# Patient Record
Sex: FEMALE | Race: BLACK OR AFRICAN AMERICAN | NOT HISPANIC OR LATINO | URBAN - METROPOLITAN AREA
[De-identification: names, ages, dates, MRNs, and addresses within clinical notes are randomized per-mention and may not be internally consistent; named-entity substitution may affect disease eponyms.]

---

## 2017-07-22 ENCOUNTER — OUTPATIENT (OUTPATIENT)
Dept: OUTPATIENT SERVICES | Facility: HOSPITAL | Age: 67
LOS: 1 days | Discharge: HOME | End: 2017-07-22

## 2017-07-22 DIAGNOSIS — I10 ESSENTIAL (PRIMARY) HYPERTENSION: ICD-10-CM

## 2017-07-22 DIAGNOSIS — E11.9 TYPE 2 DIABETES MELLITUS WITHOUT COMPLICATIONS: ICD-10-CM

## 2018-02-24 ENCOUNTER — OUTPATIENT (OUTPATIENT)
Dept: OUTPATIENT SERVICES | Facility: HOSPITAL | Age: 68
LOS: 1 days | Discharge: HOME | End: 2018-02-24

## 2018-02-24 DIAGNOSIS — I10 ESSENTIAL (PRIMARY) HYPERTENSION: ICD-10-CM

## 2018-02-24 DIAGNOSIS — E11.9 TYPE 2 DIABETES MELLITUS WITHOUT COMPLICATIONS: ICD-10-CM

## 2018-02-24 DIAGNOSIS — E78.2 MIXED HYPERLIPIDEMIA: ICD-10-CM

## 2018-10-02 ENCOUNTER — OUTPATIENT (OUTPATIENT)
Dept: OUTPATIENT SERVICES | Facility: HOSPITAL | Age: 68
LOS: 1 days | Discharge: HOME | End: 2018-10-02

## 2018-10-02 DIAGNOSIS — I10 ESSENTIAL (PRIMARY) HYPERTENSION: ICD-10-CM

## 2018-10-02 DIAGNOSIS — E66.01 MORBID (SEVERE) OBESITY DUE TO EXCESS CALORIES: ICD-10-CM

## 2018-10-02 DIAGNOSIS — E11.9 TYPE 2 DIABETES MELLITUS WITHOUT COMPLICATIONS: ICD-10-CM

## 2018-10-02 DIAGNOSIS — E78.2 MIXED HYPERLIPIDEMIA: ICD-10-CM

## 2019-04-02 ENCOUNTER — CHART COPY (OUTPATIENT)
Age: 69
End: 2019-04-02

## 2019-04-02 ENCOUNTER — OUTPATIENT (OUTPATIENT)
Dept: OUTPATIENT SERVICES | Facility: HOSPITAL | Age: 69
LOS: 1 days | Discharge: HOME | End: 2019-04-02

## 2019-04-02 ENCOUNTER — APPOINTMENT (OUTPATIENT)
Dept: CARDIOLOGY | Facility: CLINIC | Age: 69
End: 2019-04-02
Payer: MEDICARE

## 2019-04-02 VITALS
HEIGHT: 61 IN | SYSTOLIC BLOOD PRESSURE: 123 MMHG | DIASTOLIC BLOOD PRESSURE: 70 MMHG | WEIGHT: 167 LBS | BODY MASS INDEX: 31.53 KG/M2

## 2019-04-02 DIAGNOSIS — E78.2 MIXED HYPERLIPIDEMIA: ICD-10-CM

## 2019-04-02 DIAGNOSIS — E11.9 TYPE 2 DIABETES MELLITUS WITHOUT COMPLICATIONS: ICD-10-CM

## 2019-04-02 DIAGNOSIS — E08.29 DIABETES MELLITUS DUE TO UNDERLYING CONDITION WITH OTHER DIABETIC KIDNEY COMPLICATION: ICD-10-CM

## 2019-04-02 DIAGNOSIS — I10 ESSENTIAL (PRIMARY) HYPERTENSION: ICD-10-CM

## 2019-04-02 DIAGNOSIS — R80.9 DIABETES MELLITUS DUE TO UNDERLYING CONDITION WITH OTHER DIABETIC KIDNEY COMPLICATION: ICD-10-CM

## 2019-04-02 DIAGNOSIS — Z78.9 OTHER SPECIFIED HEALTH STATUS: ICD-10-CM

## 2019-04-02 DIAGNOSIS — Z79.4 DIABETES MELLITUS DUE TO UNDERLYING CONDITION WITH OTHER DIABETIC KIDNEY COMPLICATION: ICD-10-CM

## 2019-04-02 PROCEDURE — 93000 ELECTROCARDIOGRAM COMPLETE: CPT

## 2019-04-02 PROCEDURE — 99214 OFFICE O/P EST MOD 30 MIN: CPT

## 2019-04-02 NOTE — PHYSICAL EXAM
[General Appearance - Well Developed] : well developed [Normal Appearance] : normal appearance [Well Groomed] : well groomed [General Appearance - Well Nourished] : well nourished [No Deformities] : no deformities [General Appearance - In No Acute Distress] : no acute distress [FreeTextEntry1] : left arm puse is weak

## 2019-04-02 NOTE — HISTORY OF PRESENT ILLNESS
[FreeTextEntry1] : pt has hx htn followed in the past by DR Treadwell\par  pt had e cho and stress test were normal\par  pt denies any chest pain\par  no dyspnoea\par  meds reviewed\par

## 2019-04-02 NOTE — ASSESSMENT
[FreeTextEntry1] : bp rt arm 120/80 and lt arm 90/60 significant difference noted but pt denies any sx\par  cardiac status is stable\par   e k g showed nsr no acute stt changes seen\par  meds renewed\par  labs done this am for DR Joel\par  will order ct angio of aotiac arch

## 2019-05-14 ENCOUNTER — APPOINTMENT (OUTPATIENT)
Dept: CARDIOLOGY | Facility: CLINIC | Age: 69
End: 2019-05-14
Payer: MEDICARE

## 2019-05-14 VITALS
DIASTOLIC BLOOD PRESSURE: 90 MMHG | WEIGHT: 167 LBS | SYSTOLIC BLOOD PRESSURE: 140 MMHG | HEIGHT: 61 IN | BODY MASS INDEX: 31.53 KG/M2

## 2019-05-14 DIAGNOSIS — R09.89 OTHER SPECIFIED SYMPTOMS AND SIGNS INVOLVING THE CIRCULATORY AND RESPIRATORY SYSTEMS: ICD-10-CM

## 2019-05-14 PROCEDURE — 99213 OFFICE O/P EST LOW 20 MIN: CPT

## 2019-05-14 NOTE — ASSESSMENT
[FreeTextEntry1] :  blood work from 4/19 reviewed\par  cbc cmp and lipid s are wnl\par  hgba1c 6.3 well controlled\par  needs aortic angiogram to explain low left arm pulse

## 2019-05-14 NOTE — PHYSICAL EXAM
[General Appearance - Well Developed] : well developed [Normal Appearance] : normal appearance [Well Groomed] : well groomed [No Deformities] : no deformities [General Appearance - Well Nourished] : well nourished [General Appearance - In No Acute Distress] : no acute distress

## 2019-05-14 NOTE — HISTORY OF PRESENT ILLNESS
[FreeTextEntry1] : pt is feeling well\par  no chest pains\par  pt has decreased pulse and bp on left arm\par  c/o occ dizzyness\par  no chest pain

## 2019-06-25 ENCOUNTER — RX RENEWAL (OUTPATIENT)
Age: 69
End: 2019-06-25

## 2019-08-06 ENCOUNTER — APPOINTMENT (OUTPATIENT)
Dept: CARDIOLOGY | Facility: CLINIC | Age: 69
End: 2019-08-06
Payer: MEDICARE

## 2019-08-06 VITALS
WEIGHT: 168 LBS | DIASTOLIC BLOOD PRESSURE: 75 MMHG | BODY MASS INDEX: 31.72 KG/M2 | HEIGHT: 61 IN | SYSTOLIC BLOOD PRESSURE: 130 MMHG

## 2019-08-06 PROCEDURE — 99213 OFFICE O/P EST LOW 20 MIN: CPT

## 2019-08-06 NOTE — HISTORY OF PRESENT ILLNESS
[FreeTextEntry1] : pt is feeling well\par  no chest pains\par  no palpitations\par  pt had ct angio for decreased puyse and bp on left side itb showed arteriosclerotic narrowing of left subclavian aretery  \par  pt is asymptomatic

## 2019-08-06 NOTE — ASSESSMENT
[FreeTextEntry1] :  meds reneewd\par  cardiac ststus is wnl\par  diabetes is better \par  needs blood work done by DR Joel

## 2019-09-30 ENCOUNTER — OUTPATIENT (OUTPATIENT)
Dept: OUTPATIENT SERVICES | Facility: HOSPITAL | Age: 69
LOS: 1 days | Discharge: HOME | End: 2019-09-30

## 2019-09-30 DIAGNOSIS — E78.2 MIXED HYPERLIPIDEMIA: ICD-10-CM

## 2019-09-30 DIAGNOSIS — E11.9 TYPE 2 DIABETES MELLITUS WITHOUT COMPLICATIONS: ICD-10-CM

## 2019-09-30 DIAGNOSIS — I10 ESSENTIAL (PRIMARY) HYPERTENSION: ICD-10-CM

## 2019-09-30 DIAGNOSIS — M25.562 PAIN IN LEFT KNEE: ICD-10-CM

## 2019-11-08 ENCOUNTER — APPOINTMENT (OUTPATIENT)
Dept: CARDIOLOGY | Facility: CLINIC | Age: 69
End: 2019-11-08
Payer: MEDICARE

## 2019-11-08 VITALS
BODY MASS INDEX: 31.34 KG/M2 | WEIGHT: 166 LBS | DIASTOLIC BLOOD PRESSURE: 80 MMHG | HEIGHT: 61 IN | SYSTOLIC BLOOD PRESSURE: 130 MMHG

## 2019-11-08 PROCEDURE — 99213 OFFICE O/P EST LOW 20 MIN: CPT

## 2019-11-08 NOTE — PHYSICAL EXAM
[Normal Appearance] : normal appearance [General Appearance - Well Developed] : well developed [General Appearance - Well Nourished] : well nourished [General Appearance - In No Acute Distress] : no acute distress [Well Groomed] : well groomed [No Deformities] : no deformities [Abdomen Tenderness] : non-tender [] : no hepato-splenomegaly [Abdomen Soft] : soft [Abdomen Mass (___ Cm)] : no abdominal mass palpated

## 2019-11-08 NOTE — PHYSICAL EXAM
[General Appearance - Well Developed] : well developed [Normal Appearance] : normal appearance [No Deformities] : no deformities [Well Groomed] : well groomed [General Appearance - Well Nourished] : well nourished [General Appearance - In No Acute Distress] : no acute distress [Abdomen Mass (___ Cm)] : no abdominal mass palpated [Abdomen Tenderness] : non-tender [Abdomen Soft] : soft [] : no hepato-splenomegaly

## 2019-12-11 ENCOUNTER — RX RENEWAL (OUTPATIENT)
Age: 69
End: 2019-12-11

## 2019-12-17 ENCOUNTER — OUTPATIENT (OUTPATIENT)
Dept: OUTPATIENT SERVICES | Facility: HOSPITAL | Age: 69
LOS: 1 days | Discharge: HOME | End: 2019-12-17

## 2019-12-17 DIAGNOSIS — K92.1 MELENA: ICD-10-CM

## 2020-02-13 ENCOUNTER — APPOINTMENT (OUTPATIENT)
Dept: CARDIOLOGY | Facility: CLINIC | Age: 70
End: 2020-02-13
Payer: MEDICARE

## 2020-02-13 VITALS
SYSTOLIC BLOOD PRESSURE: 115 MMHG | DIASTOLIC BLOOD PRESSURE: 65 MMHG | BODY MASS INDEX: 29.83 KG/M2 | HEIGHT: 61 IN | WEIGHT: 158 LBS

## 2020-02-13 PROCEDURE — 99213 OFFICE O/P EST LOW 20 MIN: CPT

## 2020-02-13 NOTE — REASON FOR VISIT
[Follow-Up - Clinic] : a clinic follow-up of [Hyperlipidemia] : hyperlipidemia [FreeTextEntry1] : diabetes [Hypertension] : hypertension

## 2020-02-13 NOTE — HISTORY OF PRESENT ILLNESS
[FreeTextEntry1] : pt is feeling well\par  pt lost 8 lbs fbs [id number] good\par  no chest pains\par  no palp[itations\par  meds reviewed\par  need blood work

## 2020-02-13 NOTE — ASSESSMENT
[FreeTextEntry1] : pt is feeling well\par  no anginal sx\par  pt has weak left radial pulse\par  bp 130/80 well controlled\par  meds reviewed\par  blood work will be done by pmd DR Joel\par  cardiac ststus is stable

## 2020-04-27 ENCOUNTER — RX RENEWAL (OUTPATIENT)
Age: 70
End: 2020-04-27

## 2020-06-20 ENCOUNTER — RX RENEWAL (OUTPATIENT)
Age: 70
End: 2020-06-20

## 2020-08-04 ENCOUNTER — APPOINTMENT (OUTPATIENT)
Dept: CARDIOLOGY | Facility: CLINIC | Age: 70
End: 2020-08-04
Payer: MEDICARE

## 2020-08-04 VITALS — WEIGHT: 160 LBS | BODY MASS INDEX: 30.23 KG/M2 | SYSTOLIC BLOOD PRESSURE: 124 MMHG | DIASTOLIC BLOOD PRESSURE: 75 MMHG

## 2020-08-04 PROCEDURE — 93000 ELECTROCARDIOGRAM COMPLETE: CPT

## 2020-08-04 PROCEDURE — 99213 OFFICE O/P EST LOW 20 MIN: CPT

## 2020-08-04 NOTE — ASSESSMENT
[FreeTextEntry1] : htn well controlled\par  meds reviewed\par  e k g nsr pac nonspecific t wave changes noted\par  pmd did blood work in may\par  cardiac status is stable

## 2020-08-04 NOTE — PHYSICAL EXAM
[General Appearance - Well Developed] : well developed [Well Groomed] : well groomed [Normal Appearance] : normal appearance [General Appearance - Well Nourished] : well nourished [No Deformities] : no deformities [Heart Rate And Rhythm] : heart rate and rhythm were normal [General Appearance - In No Acute Distress] : no acute distress [Heart Sounds] : normal S1 and S2 [Murmurs] : no murmurs present

## 2020-08-04 NOTE — HISTORY OF PRESENT ILLNESS
[FreeTextEntry1] : pt is feeling better\par  pt denies any chest pains or dyspnoea\par  pt  was seen by pmd found elevated bp and renal sono was done in 5/20 was wnl\par  meds reviewed\par  bp is well controlled now 124/80

## 2020-11-05 ENCOUNTER — APPOINTMENT (OUTPATIENT)
Dept: CARDIOLOGY | Facility: CLINIC | Age: 70
End: 2020-11-05
Payer: MEDICARE

## 2020-11-05 VITALS
SYSTOLIC BLOOD PRESSURE: 123 MMHG | WEIGHT: 161 LBS | DIASTOLIC BLOOD PRESSURE: 70 MMHG | TEMPERATURE: 97.5 F | BODY MASS INDEX: 30.42 KG/M2

## 2020-11-05 PROCEDURE — 99213 OFFICE O/P EST LOW 20 MIN: CPT

## 2020-11-05 NOTE — ASSESSMENT
[FreeTextEntry1] : bp 130/80\par  no anginak sx\par  cardiac status is stable\par  meds reviewed with pt

## 2020-11-05 NOTE — HISTORY OF PRESENT ILLNESS
[FreeTextEntry1] : pt is feeling better\par  no chest pain\par  no exertional dyspnoea\par   meds reviewed\par  pt is seen by pmd and blood work done\par  fbs today was 110

## 2020-11-17 ENCOUNTER — RX RENEWAL (OUTPATIENT)
Age: 70
End: 2020-11-17

## 2021-01-01 ENCOUNTER — APPOINTMENT (OUTPATIENT)
Dept: NEUROSURGERY | Facility: CLINIC | Age: 71
End: 2021-01-01
Payer: MEDICARE

## 2021-01-01 ENCOUNTER — APPOINTMENT (OUTPATIENT)
Dept: NEUROLOGY | Facility: CLINIC | Age: 71
End: 2021-01-01
Payer: MEDICARE

## 2021-01-01 ENCOUNTER — APPOINTMENT (OUTPATIENT)
Dept: NEUROLOGY | Facility: CLINIC | Age: 71
End: 2021-01-01

## 2021-01-01 ENCOUNTER — APPOINTMENT (OUTPATIENT)
Dept: CARDIOLOGY | Facility: CLINIC | Age: 71
End: 2021-01-01
Payer: MEDICARE

## 2021-01-01 ENCOUNTER — NON-APPOINTMENT (OUTPATIENT)
Age: 71
End: 2021-01-01

## 2021-01-01 VITALS
BODY MASS INDEX: 25.91 KG/M2 | DIASTOLIC BLOOD PRESSURE: 82 MMHG | OXYGEN SATURATION: 98 % | HEART RATE: 78 BPM | TEMPERATURE: 97.3 F | SYSTOLIC BLOOD PRESSURE: 133 MMHG | WEIGHT: 132 LBS | HEIGHT: 60 IN

## 2021-01-01 VITALS
WEIGHT: 136 LBS | DIASTOLIC BLOOD PRESSURE: 60 MMHG | BODY MASS INDEX: 25.68 KG/M2 | HEIGHT: 61 IN | TEMPERATURE: 97.2 F | HEART RATE: 81 BPM | SYSTOLIC BLOOD PRESSURE: 110 MMHG

## 2021-01-01 VITALS — WEIGHT: 137 LBS | HEIGHT: 61 IN | BODY MASS INDEX: 25.86 KG/M2

## 2021-01-01 DIAGNOSIS — E78.5 HYPERLIPIDEMIA, UNSPECIFIED: ICD-10-CM

## 2021-01-01 DIAGNOSIS — I10 ESSENTIAL (PRIMARY) HYPERTENSION: ICD-10-CM

## 2021-01-01 LAB
ALBUMIN SERPL ELPH-MCNC: 4.4 G/DL
ALP BLD-CCNC: 81 U/L
ALT SERPL-CCNC: 43 U/L
ANION GAP SERPL CALC-SCNC: 12 MMOL/L
AST SERPL-CCNC: 39 U/L
BASOPHILS # BLD AUTO: 0.06 K/UL
BASOPHILS NFR BLD AUTO: 0.8 %
BILIRUB SERPL-MCNC: 0.6 MG/DL
BUN SERPL-MCNC: 24 MG/DL
CALCIUM SERPL-MCNC: 10 MG/DL
CHLORIDE SERPL-SCNC: 101 MMOL/L
CO2 SERPL-SCNC: 25 MMOL/L
CREAT SERPL-MCNC: 0.8 MG/DL
EOSINOPHIL # BLD AUTO: 0.05 K/UL
EOSINOPHIL NFR BLD AUTO: 0.6 %
GLUCOSE SERPL-MCNC: 90 MG/DL
HCT VFR BLD CALC: 41.5 %
HGB BLD-MCNC: 13.5 G/DL
IMM GRANULOCYTES NFR BLD AUTO: 0.5 %
LYMPHOCYTES # BLD AUTO: 2.06 K/UL
LYMPHOCYTES NFR BLD AUTO: 25.9 %
MAN DIFF?: NORMAL
MCHC RBC-ENTMCNC: 31.1 PG
MCHC RBC-ENTMCNC: 32.5 G/DL
MCV RBC AUTO: 95.6 FL
MONOCYTES # BLD AUTO: 0.53 K/UL
MONOCYTES NFR BLD AUTO: 6.7 %
NEUTROPHILS # BLD AUTO: 5.2 K/UL
NEUTROPHILS NFR BLD AUTO: 65.5 %
PLATELET # BLD AUTO: 231 K/UL
POTASSIUM SERPL-SCNC: 4.4 MMOL/L
PROT SERPL-MCNC: 6.4 G/DL
RBC # BLD: 4.34 M/UL
RBC # FLD: 13.1 %
SODIUM SERPL-SCNC: 138 MMOL/L
WBC # FLD AUTO: 7.94 K/UL

## 2021-01-01 PROCEDURE — 99215 OFFICE O/P EST HI 40 MIN: CPT

## 2021-01-01 PROCEDURE — 99204 OFFICE O/P NEW MOD 45 MIN: CPT

## 2021-01-01 PROCEDURE — 99213 OFFICE O/P EST LOW 20 MIN: CPT

## 2021-01-01 PROCEDURE — 93000 ELECTROCARDIOGRAM COMPLETE: CPT

## 2021-02-05 ENCOUNTER — APPOINTMENT (OUTPATIENT)
Dept: CARDIOLOGY | Facility: CLINIC | Age: 71
End: 2021-02-05
Payer: MEDICARE

## 2021-02-05 VITALS
TEMPERATURE: 97.3 F | SYSTOLIC BLOOD PRESSURE: 130 MMHG | WEIGHT: 160 LBS | BODY MASS INDEX: 30.23 KG/M2 | HEART RATE: 85 BPM | DIASTOLIC BLOOD PRESSURE: 70 MMHG

## 2021-02-05 PROCEDURE — 93000 ELECTROCARDIOGRAM COMPLETE: CPT

## 2021-02-05 PROCEDURE — 99212 OFFICE O/P EST SF 10 MIN: CPT

## 2021-02-05 NOTE — ASSESSMENT
[FreeTextEntry1] :  e k g nsr poor r vave progression in chest leads\par  pt is asymptomatic no cardiac sx\par  meds reviewed\par  labs reviewed all wnl

## 2021-02-05 NOTE — HISTORY OF PRESENT ILLNESS
[FreeTextEntry1] : pt is feeling well\par  no anginal sx\par  no exertional dyspnoea\par  meds reviewed\par  blood work from 11/20 reviewed diabetes and lipids controlled well\par  hgba1c 6.2\par  no cardiav sx

## 2021-03-03 ENCOUNTER — APPOINTMENT (OUTPATIENT)
Dept: NEUROLOGY | Facility: CLINIC | Age: 71
End: 2021-03-03
Payer: MEDICARE

## 2021-03-03 VITALS
OXYGEN SATURATION: 97 % | SYSTOLIC BLOOD PRESSURE: 142 MMHG | HEIGHT: 61 IN | TEMPERATURE: 98.8 F | DIASTOLIC BLOOD PRESSURE: 81 MMHG | BODY MASS INDEX: 31.53 KG/M2 | HEART RATE: 86 BPM | WEIGHT: 167 LBS

## 2021-03-03 DIAGNOSIS — R29.898 OTHER SYMPTOMS AND SIGNS INVOLVING THE MUSCULOSKELETAL SYSTEM: ICD-10-CM

## 2021-03-03 DIAGNOSIS — R20.0 ANESTHESIA OF SKIN: ICD-10-CM

## 2021-03-03 DIAGNOSIS — R20.2 ANESTHESIA OF SKIN: ICD-10-CM

## 2021-03-03 PROCEDURE — 99203 OFFICE O/P NEW LOW 30 MIN: CPT

## 2021-03-03 NOTE — REVIEW OF SYSTEMS
[Hand Weakness] :  hand weakness [Numbness] : numbness [Tingling] : tingling [Arthralgias] : arthralgias [Negative] : Heme/Lymph

## 2021-03-03 NOTE — HISTORY OF PRESENT ILLNESS
[FreeTextEntry1] : FAITH FU is a 70 year old woman with history of hypertensin and diabetes is here as a new patient to be assessed for constant numbness in her toes  for the pas few months and right hand  weakness in right hand. \par She denies significant numbness in the right hand but reports dropping objects. She denies significant neck or low back pain. No speech changes or blurred vision.

## 2021-03-03 NOTE — ASSESSMENT
[FreeTextEntry1] : FAITH FU is a 70 year old woman with history of hypertension and diabetes presents with new onset right hand weakness and numbness and paresthesia in the feet. Exam is significant for mild right  weakness with no sensory deficit, general hyperreflexia and sensory deficit in the feet suspected for neuropathy. Given the hand weakness and hyperreflexia, would like assess the UMN lesion in the brain and cervical spine. I would consider performing EMG in the office checking for neuropathy.

## 2021-03-03 NOTE — PHYSICAL EXAM
[FreeTextEntry1] : Mental status: Awake, alert and oriented x3.  Recent and remote memory intact.  Naming, repetition and comprehension intact.  Attention/concentration intact.  No dysarthria, no aphasia.  Fund of knowledge appropriate.  \par Cranial nerves: Pupils equally round and reactive to light, visual fields full, no nystagmus, extraocular muscles intact, V1 through V3 intact bilaterally and symmetric, face symmetric, hearing intact to finger rub, palate elevation symmetric, tongue was midline.\par Motor:  MRC grading 5/5 b/l UE/LE.   strength is mildly weak in the right hand.  Normal tone and bulk.  No abnormal movements.  No bradykinesia. No rigidity \par Sensation: Reduced vibration in the toes and ankles \par Coordination: No dysmetria on finger-to-nose\par Reflexes: 4+ in bilateral UE/LE, downgoing toes bilaterally. Positive clonus in the right leg.  \par Gait: wide based  No ataxia.  Romberg positive \par \par

## 2021-05-03 ENCOUNTER — APPOINTMENT (OUTPATIENT)
Dept: CARDIOLOGY | Facility: CLINIC | Age: 71
End: 2021-05-03
Payer: MEDICARE

## 2021-05-03 VITALS
HEIGHT: 61 IN | DIASTOLIC BLOOD PRESSURE: 80 MMHG | BODY MASS INDEX: 28.89 KG/M2 | TEMPERATURE: 97.4 F | WEIGHT: 153 LBS | SYSTOLIC BLOOD PRESSURE: 130 MMHG | HEART RATE: 104 BPM

## 2021-05-03 PROCEDURE — 93000 ELECTROCARDIOGRAM COMPLETE: CPT

## 2021-05-03 PROCEDURE — 99212 OFFICE O/P EST SF 10 MIN: CPT

## 2021-05-03 NOTE — REASON FOR VISIT
[Symptom and Test Evaluation] : symptom and test evaluation [Arrhythmia/ECG Abnorrmalities] : arrhythmia/ECG abnormalities [Hyperlipidemia] : hyperlipidemia [Hypertension] : hypertension

## 2021-05-03 NOTE — ASSESSMENT
[FreeTextEntry1] :  pt has no cardiac sx\par  e k g nsr lad and old ant wll mi changes no change from old e k g\par  cardiac ststus is stable\par  htn well controlled \par  blood work done by nidhi drake

## 2021-05-03 NOTE — HISTORY OF PRESENT ILLNESS
[FreeTextEntry1] : pt is feeling well\par  pt denies any anginal sx at rest or exertion\par  pt was seen by p m d and also neurologist mri done\par  no palpitations or dyspnoea\par  meds reviewed\par  diabetes is better

## 2021-05-24 ENCOUNTER — RX RENEWAL (OUTPATIENT)
Age: 71
End: 2021-05-24

## 2021-08-03 ENCOUNTER — APPOINTMENT (OUTPATIENT)
Dept: CARDIOLOGY | Facility: CLINIC | Age: 71
End: 2021-08-03
Payer: MEDICARE

## 2021-08-03 VITALS
TEMPERATURE: 96.9 F | HEART RATE: 79 BPM | BODY MASS INDEX: 26.81 KG/M2 | DIASTOLIC BLOOD PRESSURE: 90 MMHG | WEIGHT: 142 LBS | SYSTOLIC BLOOD PRESSURE: 140 MMHG | HEIGHT: 61 IN

## 2021-08-03 DIAGNOSIS — E11.9 TYPE 2 DIABETES MELLITUS W/OUT COMPLICATIONS: ICD-10-CM

## 2021-08-03 PROCEDURE — 99212 OFFICE O/P EST SF 10 MIN: CPT

## 2021-08-03 PROCEDURE — 93000 ELECTROCARDIOGRAM COMPLETE: CPT

## 2021-08-03 NOTE — ASSESSMENT
[FreeTextEntry1] :  shauna k g shows nsr lad no change\par  bp 130/80 well controlled\par  meds reviewed\par  cardiac ststus is stable\par  no pedal edema noted

## 2021-08-03 NOTE — HISTORY OF PRESENT ILLNESS
[FreeTextEntry1] : pt was seen by neurology for dysarthria and weakness poss A L S\par  no chest pains\par  nom exertional dyspnoea\par  meds reviewed\par  blood work from 6/21 reviewed

## 2021-08-30 ENCOUNTER — RX RENEWAL (OUTPATIENT)
Age: 71
End: 2021-08-30

## 2021-11-08 PROBLEM — I10 ESSENTIAL HYPERTENSION: Status: ACTIVE | Noted: 2019-04-02

## 2021-11-08 PROBLEM — E78.5 HYPERLIPIDEMIA, UNSPECIFIED HYPERLIPIDEMIA TYPE: Status: ACTIVE | Noted: 2019-04-02

## 2021-11-08 NOTE — HISTORY OF PRESENT ILLNESS
[FreeTextEntry1] : pt is feeling well\par  no anginal sx \par \par  meds reviewed\par  pt is seen by nidhi drake\par  blood work done by nidhi drake\par   [id number]

## 2021-11-08 NOTE — ASSESSMENT
[FreeTextEntry1] :  htn well controlled\par  no cardiac s\par  e k g nsr old ant wall mi cahnges no change from old e k g\par  no chf\par  cardiac ststus is stable

## 2021-11-26 NOTE — ASSESSMENT
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 I do not see clinical or radiographic evidence of NPH. I do not see i do not think this represents myopathy. At this time I do not feel LP or muscle biopst or structural surgery of the craniospinal axis is warranted.\par \par Given that she lives in  and getting up to the [place] [facility] clinic would be difficult I have referred her for further evaluation by Dr. Randy Leblanc for confirmation fo diagnosis as well as entrance into the ALS program/clinic here in [facility].\par \par She can follow up as needed.

## 2021-11-26 NOTE — HISTORY OF PRESENT ILLNESS
[FreeTextEntry1] : second opinion- tongue fasciculations, gait imbalance [de-identified] : This is a yeni 71 yrs old right handed female who presents today for a second opinion, accompanied by her daughter, in July 2021 she was diagnosed with ALS by Dr. OLIVERIO Witt ([facility]), patient unable to get a sooner appt with Dr. Riggs, saw Dr. Mac last Monday who was not convinced she had ALS, and send her to neurosurgery for further evaluation for a muscle biopsy and spinal tap. \par Ms. Mandujano reports of minor falls starting in [id number], then in MArch 2020, she had frequent falls, off balance, bilateral legs weakness, and slurred speech. THree months ago she noticed left hand weakness which causes her to drop items. \par Has had loss 10 pounds in the last month, unintentional. \par Denies trouble swallowing or chewing. \par Denies memory issues, and urinary incontinence. \par \par MRI debra shows atrophy with expected ventriculomegaly\par cervical spine with diffuse spondyloarthropathy with mild to moderate multi-level stenosis no cord signal abnormality\par Thoracolumbar spine with severe degenerative scoliotic deformity. Pt with no significant back or leg pain.

## 2021-12-02 NOTE — ASSESSMENT
[FreeTextEntry1] : 70 yo RHF w/ h/o HTN, DM, DLD currently p/w progressive ataxia with asymmetric non-length dependent weakness > numbness w/ hyperreflexia suspicious for MND/ALS.  Will obtain full records from Dr. Witt but agree with clinical diagnosis for now and will continue current treatment.  Family and pt would like to transfer care to  since difficult to travel to [place].  \par \par Recommendations:\par - obtain full records from Dr. Witt\par - continue riluzole 50 mg BID\par - check b/w including [id number]\par - continue BiPAP/cough assist\par - increase caloric intake as per nutritionist\par - recommend contact S/W to discuss possible ramp installation \par - PT/OT eval\par - wheelchair clinic eval\par - SLP eval for MBS and voice banking\par - continue AFO bracing\par - discussed edaravone infusions - unlikely to be candidate since significant fall risk already\par - discussed PEG option- will observe caloric intake for now\par - will refer to [facility]\par - enroll in Neuromuscular/ALS Clinic - next f/u in 3 months

## 2021-12-02 NOTE — HISTORY OF PRESENT ILLNESS
[FreeTextEntry1] : This is a 72 yo RHF previously seen by Dr. Riggs earlier this year with progressive RUE weakness and ataxia.  Per pt had been having problems with ataxia and fall since January 2020 using cane for ambulation but progressed.  Since January 2021 started noticing R hand  weakness with progressive ataxia.  Had also numbness and paresthesias in the distal toes that were chronic which has not progressed.  Was seen by Dr. Riggs who noted hyperreflexia and advised to start workup including neuroimaging and EMG/NCS.  Had neuroimaging with nonspecific changes and multilevel DDD but went to see Dr. Witt at [facility] instead who performed EMG/NCS and diagnosed her with MND/ALS.  After diagnosis confirmed, she was enrolled in ALS clinic and had evaluation with respiratory therapist, nutritionist, PT, s/w but had not seen SLP or OT.  Had cough-assist and BiPAP ordered which she uses on occasion.  Also had b/l AFO braces ordered by PT.  Continues to use rolling walker for ambulation but has 5 steps to get into her house.  \par \par Currently denies any coughing when swallowing but does tend to take longer to finish meals.  Has lost 50 lbs over the last year but still has good appetite and manages to finish everything on her plate.  Denies any orthopnea and uses BiPAP sparingly with use of 2 pillows but still sleeps on her side.  Still able to perform ADLs but finds it harder due to  weakness in the RUE.  Was started on Riluzole by Dr. Witt but was told recent b/w with slight elevation in LFTs but pt denies any GI discomfort or nausea currently.  Is otherwise stable.  Had 3rd opinion with Dr. Mac who referred pt to Dr. Higgins who in turn referred her to us for evaluation and enrollment in ALS clinic.  Denies any hypersialorrhea or emotional lability/depression/anxiety.

## 2021-12-02 NOTE — PHYSICAL EXAM
[FreeTextEntry1] : Physical examination:  \par General:   The patient is pleasant, cooperative, well dressed and in no acute distress.  Appearance is consistent with chronologic age.  No abnormal facies.\par Neurologic examination:  The patient is oriented to person, place, time and date.   Remote and recent memory is normal.   Fund of knowledge is intact and normal.  Language with normal repetition, comprehension and naming.  Nondysarthric.   \par Cranial nerves examination: intact VA, VFF.  EOMI w/o nystagmus, skew or reported double vision.  PERRL.  No ptosis/weakness of eyelid closure.  Facial sensation is normal with normal bite.  No facial asymmetry.  Hearing grossly intact b/l.  Palate elevates midline.  Neck flexion/extension, SCM/Trap strength normal.  Tongue w/ significant atrophy with fasciculations.    \par Motor examination:   decreased bulk with atrophy.  (+) fasciculations b/l UE.\par Formal Muscle Strength Testing: (MRC grade R/L) 4+/4+YOHAN, 4/4+ BB, 5/5 TR, WF, WE; 3/3 FDI; 4/4 ILP; 5/5 QDS, HS; 4/4 DF; 5/4+ PF; 3/3 TP/PL.  \par Reflexes:   3+ b/l pectoralis, biceps, triceps, brachioradialis, patella and Achilles.  Plantar response downgoing b/l.  Jaw jerk, Rosario, clonus absent.  \par Sensory examination:  grossly intact\par Cerebellum:   FTN/HKS intact with normal ADAMARIS in all limbs.   Gait is wide based, steppage requires walker.  \par \par

## 2022-01-01 ENCOUNTER — APPOINTMENT (OUTPATIENT)
Dept: NEUROLOGY | Facility: CLINIC | Age: 72
End: 2022-01-01

## 2022-01-01 ENCOUNTER — INPATIENT (INPATIENT)
Facility: HOSPITAL | Age: 72
LOS: 11 days | Discharge: HOSPICE | End: 2022-09-30
Attending: STUDENT IN AN ORGANIZED HEALTH CARE EDUCATION/TRAINING PROGRAM | Admitting: STUDENT IN AN ORGANIZED HEALTH CARE EDUCATION/TRAINING PROGRAM

## 2022-01-01 ENCOUNTER — RESULT REVIEW (OUTPATIENT)
Age: 72
End: 2022-01-01

## 2022-01-01 ENCOUNTER — TRANSCRIPTION ENCOUNTER (OUTPATIENT)
Age: 72
End: 2022-01-01

## 2022-01-01 ENCOUNTER — APPOINTMENT (OUTPATIENT)
Dept: NEUROLOGY | Facility: CLINIC | Age: 72
End: 2022-01-01
Payer: MEDICARE

## 2022-01-01 ENCOUNTER — APPOINTMENT (OUTPATIENT)
Dept: CARDIOLOGY | Facility: CLINIC | Age: 72
End: 2022-01-01

## 2022-01-01 ENCOUNTER — OUTPATIENT (OUTPATIENT)
Dept: OUTPATIENT SERVICES | Facility: HOSPITAL | Age: 72
LOS: 1 days | Discharge: HOME | End: 2022-01-01

## 2022-01-01 ENCOUNTER — NON-APPOINTMENT (OUTPATIENT)
Age: 72
End: 2022-01-01

## 2022-01-01 ENCOUNTER — OUTPATIENT (OUTPATIENT)
Dept: OUTPATIENT SERVICES | Facility: HOSPITAL | Age: 72
LOS: 1 days | Discharge: HOME | End: 2022-01-01
Payer: MEDICARE

## 2022-01-01 ENCOUNTER — APPOINTMENT (OUTPATIENT)
Dept: CARDIOLOGY | Facility: CLINIC | Age: 72
End: 2022-01-01
Payer: MEDICARE

## 2022-01-01 ENCOUNTER — APPOINTMENT (OUTPATIENT)
Dept: PULMONOLOGY | Facility: CLINIC | Age: 72
End: 2022-01-01

## 2022-01-01 VITALS
BODY MASS INDEX: 24.94 KG/M2 | TEMPERATURE: 96 F | HEART RATE: 60 BPM | SYSTOLIC BLOOD PRESSURE: 110 MMHG | HEIGHT: 60 IN | DIASTOLIC BLOOD PRESSURE: 80 MMHG | WEIGHT: 127 LBS

## 2022-01-01 VITALS
WEIGHT: 83 LBS | OXYGEN SATURATION: 97 % | DIASTOLIC BLOOD PRESSURE: 70 MMHG | HEART RATE: 76 BPM | HEIGHT: 62 IN | BODY MASS INDEX: 15.27 KG/M2 | SYSTOLIC BLOOD PRESSURE: 130 MMHG

## 2022-01-01 VITALS
HEIGHT: 60 IN | BODY MASS INDEX: 16.3 KG/M2 | TEMPERATURE: 97.6 F | SYSTOLIC BLOOD PRESSURE: 130 MMHG | DIASTOLIC BLOOD PRESSURE: 82 MMHG | HEART RATE: 64 BPM | OXYGEN SATURATION: 93 % | WEIGHT: 83 LBS

## 2022-01-01 VITALS
OXYGEN SATURATION: 96 % | SYSTOLIC BLOOD PRESSURE: 149 MMHG | HEART RATE: 111 BPM | TEMPERATURE: 97 F | WEIGHT: 127 LBS | BODY MASS INDEX: 24.94 KG/M2 | DIASTOLIC BLOOD PRESSURE: 91 MMHG | HEIGHT: 60 IN

## 2022-01-01 VITALS — TEMPERATURE: 95 F | HEART RATE: 72 BPM | DIASTOLIC BLOOD PRESSURE: 79 MMHG | SYSTOLIC BLOOD PRESSURE: 118 MMHG

## 2022-01-01 VITALS
DIASTOLIC BLOOD PRESSURE: 71 MMHG | SYSTOLIC BLOOD PRESSURE: 106 MMHG | OXYGEN SATURATION: 97 % | RESPIRATION RATE: 17 BRPM | TEMPERATURE: 98 F | WEIGHT: 87.08 LBS | HEART RATE: 67 BPM

## 2022-01-01 VITALS
DIASTOLIC BLOOD PRESSURE: 80 MMHG | TEMPERATURE: 96.9 F | BODY MASS INDEX: 25.72 KG/M2 | WEIGHT: 131 LBS | HEIGHT: 60 IN | SYSTOLIC BLOOD PRESSURE: 138 MMHG

## 2022-01-01 VITALS
SYSTOLIC BLOOD PRESSURE: 155 MMHG | BODY MASS INDEX: 25.72 KG/M2 | WEIGHT: 131 LBS | HEIGHT: 60 IN | OXYGEN SATURATION: 98 % | DIASTOLIC BLOOD PRESSURE: 83 MMHG | HEART RATE: 79 BPM

## 2022-01-01 DIAGNOSIS — R06.02 SHORTNESS OF BREATH: ICD-10-CM

## 2022-01-01 DIAGNOSIS — G12.21 AMYOTROPHIC LATERAL SCLEROSIS: ICD-10-CM

## 2022-01-01 DIAGNOSIS — R47.1 DYSARTHRIA AND ANARTHRIA: ICD-10-CM

## 2022-01-01 DIAGNOSIS — E63.9 NUTRITIONAL DEFICIENCY, UNSPECIFIED: ICD-10-CM

## 2022-01-01 DIAGNOSIS — I26.99 OTHER PULMONARY EMBOLISM WITHOUT ACUTE COR PULMONALE: ICD-10-CM

## 2022-01-01 DIAGNOSIS — R09.89 OTHER SPECIFIED SYMPTOMS AND SIGNS INVOLVING THE CIRCULATORY AND RESPIRATORY SYSTEMS: ICD-10-CM

## 2022-01-01 DIAGNOSIS — F41.9 ANXIETY DISORDER, UNSPECIFIED: ICD-10-CM

## 2022-01-01 DIAGNOSIS — Z00.00 ENCOUNTER FOR GENERAL ADULT MEDICAL EXAMINATION W/OUT ABNORMAL FINDINGS: ICD-10-CM

## 2022-01-01 DIAGNOSIS — R52 PAIN, UNSPECIFIED: ICD-10-CM

## 2022-01-01 DIAGNOSIS — H26.9 UNSPECIFIED CATARACT: ICD-10-CM

## 2022-01-01 DIAGNOSIS — R13.10 DYSPHAGIA, UNSPECIFIED: ICD-10-CM

## 2022-01-01 DIAGNOSIS — Z02.9 ENCOUNTER FOR ADMINISTRATIVE EXAMINATIONS, UNSPECIFIED: ICD-10-CM

## 2022-01-01 DIAGNOSIS — Z51.5 ENCOUNTER FOR PALLIATIVE CARE: ICD-10-CM

## 2022-01-01 DIAGNOSIS — R07.9 CHEST PAIN, UNSPECIFIED: ICD-10-CM

## 2022-01-01 DIAGNOSIS — E78.5 HYPERLIPIDEMIA, UNSPECIFIED: ICD-10-CM

## 2022-01-01 DIAGNOSIS — Z71.89 OTHER SPECIFIED COUNSELING: ICD-10-CM

## 2022-01-01 DIAGNOSIS — L97.809 NON-PRESSURE CHRONIC ULCER OF OTHER PART OF UNSPECIFIED LOWER LEG WITH UNSPECIFIED SEVERITY: ICD-10-CM

## 2022-01-01 DIAGNOSIS — R20.9 UNSPECIFIED DISTURBANCES OF SKIN SENSATION: ICD-10-CM

## 2022-01-01 LAB
A1C WITH ESTIMATED AVERAGE GLUCOSE RESULT: 4.7 % — SIGNIFICANT CHANGE UP (ref 4–5.6)
ABO RH CONFIRMATION: SIGNIFICANT CHANGE UP
ALBUMIN SERPL ELPH-MCNC: 3.1 G/DL — LOW (ref 3.5–5.2)
ALBUMIN SERPL ELPH-MCNC: 3.1 G/DL — LOW (ref 3.5–5.2)
ALBUMIN SERPL ELPH-MCNC: 3.2 G/DL — LOW (ref 3.5–5.2)
ALBUMIN SERPL ELPH-MCNC: 3.2 G/DL — LOW (ref 3.5–5.2)
ALBUMIN SERPL ELPH-MCNC: 3.4 G/DL — LOW (ref 3.5–5.2)
ALBUMIN SERPL ELPH-MCNC: 3.8 G/DL — SIGNIFICANT CHANGE UP (ref 3.5–5.2)
ALBUMIN SERPL ELPH-MCNC: 4 G/DL — SIGNIFICANT CHANGE UP (ref 3.5–5.2)
ALBUMIN SERPL ELPH-MCNC: 4 G/DL — SIGNIFICANT CHANGE UP (ref 3.5–5.2)
ALBUMIN SERPL ELPH-MCNC: 4.4 G/DL
ALP BLD-CCNC: 71 U/L
ALP SERPL-CCNC: 104 U/L — SIGNIFICANT CHANGE UP (ref 30–115)
ALP SERPL-CCNC: 105 U/L — SIGNIFICANT CHANGE UP (ref 30–115)
ALP SERPL-CCNC: 109 U/L — SIGNIFICANT CHANGE UP (ref 30–115)
ALP SERPL-CCNC: 111 U/L — SIGNIFICANT CHANGE UP (ref 30–115)
ALP SERPL-CCNC: 117 U/L — HIGH (ref 30–115)
ALP SERPL-CCNC: 123 U/L — HIGH (ref 30–115)
ALP SERPL-CCNC: 94 U/L — SIGNIFICANT CHANGE UP (ref 30–115)
ALP SERPL-CCNC: 95 U/L — SIGNIFICANT CHANGE UP (ref 30–115)
ALT FLD-CCNC: 102 U/L — HIGH (ref 0–41)
ALT FLD-CCNC: 102 U/L — HIGH (ref 0–41)
ALT FLD-CCNC: 130 U/L — HIGH (ref 0–41)
ALT FLD-CCNC: 30 U/L — SIGNIFICANT CHANGE UP (ref 0–41)
ALT FLD-CCNC: 32 U/L — SIGNIFICANT CHANGE UP (ref 0–41)
ALT FLD-CCNC: 46 U/L — HIGH (ref 0–41)
ALT FLD-CCNC: 77 U/L — HIGH (ref 0–41)
ALT FLD-CCNC: 83 U/L — HIGH (ref 0–41)
ALT SERPL-CCNC: 32 U/L
ANION GAP SERPL CALC-SCNC: 11 MMOL/L — SIGNIFICANT CHANGE UP (ref 7–14)
ANION GAP SERPL CALC-SCNC: 14 MMOL/L
ANION GAP SERPL CALC-SCNC: 4 MMOL/L — LOW (ref 7–14)
ANION GAP SERPL CALC-SCNC: 5 MMOL/L — LOW (ref 7–14)
ANION GAP SERPL CALC-SCNC: 5 MMOL/L — LOW (ref 7–14)
ANION GAP SERPL CALC-SCNC: 7 MMOL/L — SIGNIFICANT CHANGE UP (ref 7–14)
ANION GAP SERPL CALC-SCNC: 7 MMOL/L — SIGNIFICANT CHANGE UP (ref 7–14)
ANION GAP SERPL CALC-SCNC: 8 MMOL/L — SIGNIFICANT CHANGE UP (ref 7–14)
ANION GAP SERPL CALC-SCNC: 8 MMOL/L — SIGNIFICANT CHANGE UP (ref 7–14)
ANION GAP SERPL CALC-SCNC: 9 MMOL/L — SIGNIFICANT CHANGE UP (ref 7–14)
APTT BLD: 29.9 SEC — SIGNIFICANT CHANGE UP (ref 27–39.2)
AST SERPL-CCNC: 106 U/L — HIGH (ref 0–41)
AST SERPL-CCNC: 37 U/L
AST SERPL-CCNC: 41 U/L — SIGNIFICANT CHANGE UP (ref 0–41)
AST SERPL-CCNC: 56 U/L — HIGH (ref 0–41)
AST SERPL-CCNC: 57 U/L — HIGH (ref 0–41)
AST SERPL-CCNC: 63 U/L — HIGH (ref 0–41)
AST SERPL-CCNC: 79 U/L — HIGH (ref 0–41)
AST SERPL-CCNC: 86 U/L — HIGH (ref 0–41)
AST SERPL-CCNC: 97 U/L — HIGH (ref 0–41)
BASE EXCESS BLDV CALC-SCNC: 12.3 MMOL/L — HIGH (ref -2–3)
BASOPHILS # BLD AUTO: 0.01 K/UL — SIGNIFICANT CHANGE UP (ref 0–0.2)
BASOPHILS # BLD AUTO: 0.02 K/UL — SIGNIFICANT CHANGE UP (ref 0–0.2)
BASOPHILS # BLD AUTO: 0.03 K/UL
BASOPHILS # BLD AUTO: 0.03 K/UL — SIGNIFICANT CHANGE UP (ref 0–0.2)
BASOPHILS NFR BLD AUTO: 0.1 % — SIGNIFICANT CHANGE UP (ref 0–1)
BASOPHILS NFR BLD AUTO: 0.2 % — SIGNIFICANT CHANGE UP (ref 0–1)
BASOPHILS NFR BLD AUTO: 0.3 % — SIGNIFICANT CHANGE UP (ref 0–1)
BASOPHILS NFR BLD AUTO: 0.4 %
BASOPHILS NFR BLD AUTO: 0.4 % — SIGNIFICANT CHANGE UP (ref 0–1)
BASOPHILS NFR BLD AUTO: 0.5 % — SIGNIFICANT CHANGE UP (ref 0–1)
BASOPHILS NFR BLD AUTO: 0.6 % — SIGNIFICANT CHANGE UP (ref 0–1)
BILIRUB SERPL-MCNC: 0.4 MG/DL — SIGNIFICANT CHANGE UP (ref 0.2–1.2)
BILIRUB SERPL-MCNC: 0.6 MG/DL
BILIRUB SERPL-MCNC: 0.6 MG/DL — SIGNIFICANT CHANGE UP (ref 0.2–1.2)
BILIRUB SERPL-MCNC: 0.7 MG/DL — SIGNIFICANT CHANGE UP (ref 0.2–1.2)
BILIRUB SERPL-MCNC: 0.9 MG/DL — SIGNIFICANT CHANGE UP (ref 0.2–1.2)
BILIRUB SERPL-MCNC: 1 MG/DL — SIGNIFICANT CHANGE UP (ref 0.2–1.2)
BUN SERPL-MCNC: 13 MG/DL — SIGNIFICANT CHANGE UP (ref 10–20)
BUN SERPL-MCNC: 14 MG/DL — SIGNIFICANT CHANGE UP (ref 10–20)
BUN SERPL-MCNC: 19 MG/DL — SIGNIFICANT CHANGE UP (ref 10–20)
BUN SERPL-MCNC: 20 MG/DL — SIGNIFICANT CHANGE UP (ref 10–20)
BUN SERPL-MCNC: 20 MG/DL — SIGNIFICANT CHANGE UP (ref 10–20)
BUN SERPL-MCNC: 21 MG/DL — HIGH (ref 10–20)
BUN SERPL-MCNC: 22 MG/DL — HIGH (ref 10–20)
BUN SERPL-MCNC: 23 MG/DL — HIGH (ref 10–20)
BUN SERPL-MCNC: 24 MG/DL — HIGH (ref 10–20)
BUN SERPL-MCNC: 24 MG/DL — HIGH (ref 10–20)
BUN SERPL-MCNC: 25 MG/DL
BUN SERPL-MCNC: 27 MG/DL — HIGH (ref 10–20)
BUN SERPL-MCNC: 37 MG/DL — HIGH (ref 10–20)
CA-I SERPL-SCNC: 1.12 MMOL/L — LOW (ref 1.15–1.33)
CALCIUM SERPL-MCNC: 8.1 MG/DL — LOW (ref 8.4–10.5)
CALCIUM SERPL-MCNC: 8.2 MG/DL — LOW (ref 8.4–10.5)
CALCIUM SERPL-MCNC: 8.3 MG/DL — LOW (ref 8.4–10.4)
CALCIUM SERPL-MCNC: 8.3 MG/DL — LOW (ref 8.4–10.5)
CALCIUM SERPL-MCNC: 8.3 MG/DL — LOW (ref 8.4–10.5)
CALCIUM SERPL-MCNC: 8.4 MG/DL — SIGNIFICANT CHANGE UP (ref 8.4–10.5)
CALCIUM SERPL-MCNC: 8.5 MG/DL — SIGNIFICANT CHANGE UP (ref 8.4–10.5)
CALCIUM SERPL-MCNC: 8.6 MG/DL — SIGNIFICANT CHANGE UP (ref 8.4–10.5)
CALCIUM SERPL-MCNC: 8.7 MG/DL — SIGNIFICANT CHANGE UP (ref 8.4–10.5)
CALCIUM SERPL-MCNC: 8.7 MG/DL — SIGNIFICANT CHANGE UP (ref 8.4–10.5)
CALCIUM SERPL-MCNC: 9 MG/DL — SIGNIFICANT CHANGE UP (ref 8.4–10.5)
CALCIUM SERPL-MCNC: 9 MG/DL — SIGNIFICANT CHANGE UP (ref 8.4–10.5)
CALCIUM SERPL-MCNC: 9.7 MG/DL
CHLORIDE SERPL-SCNC: 100 MMOL/L
CHLORIDE SERPL-SCNC: 100 MMOL/L — SIGNIFICANT CHANGE UP (ref 98–110)
CHLORIDE SERPL-SCNC: 100 MMOL/L — SIGNIFICANT CHANGE UP (ref 98–110)
CHLORIDE SERPL-SCNC: 101 MMOL/L — SIGNIFICANT CHANGE UP (ref 98–110)
CHLORIDE SERPL-SCNC: 101 MMOL/L — SIGNIFICANT CHANGE UP (ref 98–110)
CHLORIDE SERPL-SCNC: 102 MMOL/L — SIGNIFICANT CHANGE UP (ref 98–110)
CHLORIDE SERPL-SCNC: 103 MMOL/L — SIGNIFICANT CHANGE UP (ref 98–110)
CHLORIDE SERPL-SCNC: 104 MMOL/L — SIGNIFICANT CHANGE UP (ref 98–110)
CHLORIDE SERPL-SCNC: 106 MMOL/L — SIGNIFICANT CHANGE UP (ref 98–110)
CHLORIDE SERPL-SCNC: 106 MMOL/L — SIGNIFICANT CHANGE UP (ref 98–110)
CHLORIDE SERPL-SCNC: 107 MMOL/L — SIGNIFICANT CHANGE UP (ref 98–110)
CHLORIDE SERPL-SCNC: 108 MMOL/L — SIGNIFICANT CHANGE UP (ref 98–110)
CHLORIDE SERPL-SCNC: 98 MMOL/L — SIGNIFICANT CHANGE UP (ref 98–110)
CHOLEST SERPL-MCNC: 155 MG/DL
CO2 SERPL-SCNC: 23 MMOL/L
CO2 SERPL-SCNC: 27 MMOL/L — SIGNIFICANT CHANGE UP (ref 17–32)
CO2 SERPL-SCNC: 28 MMOL/L — SIGNIFICANT CHANGE UP (ref 17–32)
CO2 SERPL-SCNC: 30 MMOL/L — SIGNIFICANT CHANGE UP (ref 17–32)
CO2 SERPL-SCNC: 32 MMOL/L — SIGNIFICANT CHANGE UP (ref 17–32)
CO2 SERPL-SCNC: 32 MMOL/L — SIGNIFICANT CHANGE UP (ref 17–32)
CO2 SERPL-SCNC: 34 MMOL/L — HIGH (ref 17–32)
CO2 SERPL-SCNC: 34 MMOL/L — HIGH (ref 17–32)
CO2 SERPL-SCNC: 36 MMOL/L — HIGH (ref 17–32)
CO2 SERPL-SCNC: 37 MMOL/L — HIGH (ref 17–32)
CO2 SERPL-SCNC: 39 MMOL/L — HIGH (ref 17–32)
CREAT SERPL-MCNC: 0.5 MG/DL — LOW (ref 0.7–1.5)
CREAT SERPL-MCNC: 0.6 MG/DL — LOW (ref 0.7–1.5)
CREAT SERPL-MCNC: 0.7 MG/DL — SIGNIFICANT CHANGE UP (ref 0.7–1.5)
CREAT SERPL-MCNC: 0.7 MG/DL — SIGNIFICANT CHANGE UP (ref 0.7–1.5)
CREAT SERPL-MCNC: 0.8 MG/DL
CREAT SERPL-MCNC: 0.8 MG/DL — SIGNIFICANT CHANGE UP (ref 0.7–1.5)
CREAT SERPL-MCNC: <0.5 MG/DL — LOW (ref 0.7–1.5)
CULTURE RESULTS: SIGNIFICANT CHANGE UP
EGFR: 104 ML/MIN/1.73M2 — SIGNIFICANT CHANGE UP
EGFR: 78 ML/MIN/1.73M2
EGFR: 78 ML/MIN/1.73M2 — SIGNIFICANT CHANGE UP
EGFR: 91 ML/MIN/1.73M2 — SIGNIFICANT CHANGE UP
EGFR: 91 ML/MIN/1.73M2 — SIGNIFICANT CHANGE UP
EGFR: 95 ML/MIN/1.73M2 — SIGNIFICANT CHANGE UP
EGFR: 99 ML/MIN/1.73M2 — SIGNIFICANT CHANGE UP
EOSINOPHIL # BLD AUTO: 0.01 K/UL — SIGNIFICANT CHANGE UP (ref 0–0.7)
EOSINOPHIL # BLD AUTO: 0.02 K/UL — SIGNIFICANT CHANGE UP (ref 0–0.7)
EOSINOPHIL # BLD AUTO: 0.04 K/UL — SIGNIFICANT CHANGE UP (ref 0–0.7)
EOSINOPHIL # BLD AUTO: 0.04 K/UL — SIGNIFICANT CHANGE UP (ref 0–0.7)
EOSINOPHIL # BLD AUTO: 0.05 K/UL — SIGNIFICANT CHANGE UP (ref 0–0.7)
EOSINOPHIL # BLD AUTO: 0.06 K/UL — SIGNIFICANT CHANGE UP (ref 0–0.7)
EOSINOPHIL # BLD AUTO: 0.06 K/UL — SIGNIFICANT CHANGE UP (ref 0–0.7)
EOSINOPHIL # BLD AUTO: 0.09 K/UL
EOSINOPHIL # BLD AUTO: 0.09 K/UL — SIGNIFICANT CHANGE UP (ref 0–0.7)
EOSINOPHIL NFR BLD AUTO: 0.1 % — SIGNIFICANT CHANGE UP (ref 0–8)
EOSINOPHIL NFR BLD AUTO: 0.2 % — SIGNIFICANT CHANGE UP (ref 0–8)
EOSINOPHIL NFR BLD AUTO: 0.4 % — SIGNIFICANT CHANGE UP (ref 0–8)
EOSINOPHIL NFR BLD AUTO: 0.7 % — SIGNIFICANT CHANGE UP (ref 0–8)
EOSINOPHIL NFR BLD AUTO: 0.7 % — SIGNIFICANT CHANGE UP (ref 0–8)
EOSINOPHIL NFR BLD AUTO: 0.8 % — SIGNIFICANT CHANGE UP (ref 0–8)
EOSINOPHIL NFR BLD AUTO: 1 % — SIGNIFICANT CHANGE UP (ref 0–8)
EOSINOPHIL NFR BLD AUTO: 1.1 %
EOSINOPHIL NFR BLD AUTO: 1.5 % — SIGNIFICANT CHANGE UP (ref 0–8)
ESTIMATED AVERAGE GLUCOSE: 100 MG/DL
ESTIMATED AVERAGE GLUCOSE: 88 MG/DL — SIGNIFICANT CHANGE UP (ref 68–114)
GAS PNL BLDV: 139 MMOL/L — SIGNIFICANT CHANGE UP (ref 136–145)
GAS PNL BLDV: SIGNIFICANT CHANGE UP
GLUCOSE BLDC GLUCOMTR-MCNC: 101 MG/DL — HIGH (ref 70–99)
GLUCOSE BLDC GLUCOMTR-MCNC: 102 MG/DL — HIGH (ref 70–99)
GLUCOSE BLDC GLUCOMTR-MCNC: 105 MG/DL — HIGH (ref 70–99)
GLUCOSE BLDC GLUCOMTR-MCNC: 107 MG/DL — HIGH (ref 70–99)
GLUCOSE BLDC GLUCOMTR-MCNC: 108 MG/DL — HIGH (ref 70–99)
GLUCOSE BLDC GLUCOMTR-MCNC: 108 MG/DL — HIGH (ref 70–99)
GLUCOSE BLDC GLUCOMTR-MCNC: 110 MG/DL — HIGH (ref 70–99)
GLUCOSE BLDC GLUCOMTR-MCNC: 113 MG/DL — HIGH (ref 70–99)
GLUCOSE BLDC GLUCOMTR-MCNC: 114 MG/DL — HIGH (ref 70–99)
GLUCOSE BLDC GLUCOMTR-MCNC: 117 MG/DL — HIGH (ref 70–99)
GLUCOSE BLDC GLUCOMTR-MCNC: 119 MG/DL — HIGH (ref 70–99)
GLUCOSE BLDC GLUCOMTR-MCNC: 120 MG/DL — HIGH (ref 70–99)
GLUCOSE BLDC GLUCOMTR-MCNC: 123 MG/DL — HIGH (ref 70–99)
GLUCOSE BLDC GLUCOMTR-MCNC: 123 MG/DL — HIGH (ref 70–99)
GLUCOSE BLDC GLUCOMTR-MCNC: 131 MG/DL — HIGH (ref 70–99)
GLUCOSE BLDC GLUCOMTR-MCNC: 132 MG/DL — HIGH (ref 70–99)
GLUCOSE BLDC GLUCOMTR-MCNC: 133 MG/DL — HIGH (ref 70–99)
GLUCOSE BLDC GLUCOMTR-MCNC: 144 MG/DL — HIGH (ref 70–99)
GLUCOSE BLDC GLUCOMTR-MCNC: 147 MG/DL — HIGH (ref 70–99)
GLUCOSE BLDC GLUCOMTR-MCNC: 171 MG/DL — HIGH (ref 70–99)
GLUCOSE BLDC GLUCOMTR-MCNC: 172 MG/DL — HIGH (ref 70–99)
GLUCOSE BLDC GLUCOMTR-MCNC: 173 MG/DL — HIGH (ref 70–99)
GLUCOSE BLDC GLUCOMTR-MCNC: 184 MG/DL — HIGH (ref 70–99)
GLUCOSE BLDC GLUCOMTR-MCNC: 339 MG/DL — HIGH (ref 70–99)
GLUCOSE BLDC GLUCOMTR-MCNC: 57 MG/DL — LOW (ref 70–99)
GLUCOSE BLDC GLUCOMTR-MCNC: 63 MG/DL — LOW (ref 70–99)
GLUCOSE BLDC GLUCOMTR-MCNC: 70 MG/DL — SIGNIFICANT CHANGE UP (ref 70–99)
GLUCOSE BLDC GLUCOMTR-MCNC: 77 MG/DL — SIGNIFICANT CHANGE UP (ref 70–99)
GLUCOSE BLDC GLUCOMTR-MCNC: 78 MG/DL — SIGNIFICANT CHANGE UP (ref 70–99)
GLUCOSE BLDC GLUCOMTR-MCNC: 79 MG/DL — SIGNIFICANT CHANGE UP (ref 70–99)
GLUCOSE BLDC GLUCOMTR-MCNC: 80 MG/DL — SIGNIFICANT CHANGE UP (ref 70–99)
GLUCOSE BLDC GLUCOMTR-MCNC: 85 MG/DL — SIGNIFICANT CHANGE UP (ref 70–99)
GLUCOSE BLDC GLUCOMTR-MCNC: 86 MG/DL — SIGNIFICANT CHANGE UP (ref 70–99)
GLUCOSE BLDC GLUCOMTR-MCNC: 86 MG/DL — SIGNIFICANT CHANGE UP (ref 70–99)
GLUCOSE BLDC GLUCOMTR-MCNC: 87 MG/DL — SIGNIFICANT CHANGE UP (ref 70–99)
GLUCOSE BLDC GLUCOMTR-MCNC: 89 MG/DL — SIGNIFICANT CHANGE UP (ref 70–99)
GLUCOSE BLDC GLUCOMTR-MCNC: 90 MG/DL — SIGNIFICANT CHANGE UP (ref 70–99)
GLUCOSE BLDC GLUCOMTR-MCNC: 92 MG/DL — SIGNIFICANT CHANGE UP (ref 70–99)
GLUCOSE BLDC GLUCOMTR-MCNC: 92 MG/DL — SIGNIFICANT CHANGE UP (ref 70–99)
GLUCOSE BLDC GLUCOMTR-MCNC: 95 MG/DL — SIGNIFICANT CHANGE UP (ref 70–99)
GLUCOSE BLDC GLUCOMTR-MCNC: 95 MG/DL — SIGNIFICANT CHANGE UP (ref 70–99)
GLUCOSE BLDC GLUCOMTR-MCNC: 96 MG/DL — SIGNIFICANT CHANGE UP (ref 70–99)
GLUCOSE BLDC GLUCOMTR-MCNC: 98 MG/DL — SIGNIFICANT CHANGE UP (ref 70–99)
GLUCOSE BLDC GLUCOMTR-MCNC: 98 MG/DL — SIGNIFICANT CHANGE UP (ref 70–99)
GLUCOSE SERPL-MCNC: 103 MG/DL — HIGH (ref 70–99)
GLUCOSE SERPL-MCNC: 107 MG/DL — HIGH (ref 70–99)
GLUCOSE SERPL-MCNC: 123 MG/DL — HIGH (ref 70–99)
GLUCOSE SERPL-MCNC: 140 MG/DL — HIGH (ref 70–99)
GLUCOSE SERPL-MCNC: 148 MG/DL — HIGH (ref 70–99)
GLUCOSE SERPL-MCNC: 71 MG/DL — SIGNIFICANT CHANGE UP (ref 70–99)
GLUCOSE SERPL-MCNC: 77 MG/DL
GLUCOSE SERPL-MCNC: 81 MG/DL — SIGNIFICANT CHANGE UP (ref 70–99)
GLUCOSE SERPL-MCNC: 84 MG/DL — SIGNIFICANT CHANGE UP (ref 70–99)
GLUCOSE SERPL-MCNC: 86 MG/DL — SIGNIFICANT CHANGE UP (ref 70–99)
GLUCOSE SERPL-MCNC: 88 MG/DL — SIGNIFICANT CHANGE UP (ref 70–99)
GLUCOSE SERPL-MCNC: 92 MG/DL — SIGNIFICANT CHANGE UP (ref 70–99)
GLUCOSE SERPL-MCNC: 93 MG/DL — SIGNIFICANT CHANGE UP (ref 70–99)
GLUCOSE SERPL-MCNC: 93 MG/DL — SIGNIFICANT CHANGE UP (ref 70–99)
GLUCOSE SERPL-MCNC: 94 MG/DL — SIGNIFICANT CHANGE UP (ref 70–99)
HBA1C MFR BLD HPLC: 5.1 %
HCO3 BLDV-SCNC: 40 MMOL/L — HIGH (ref 22–29)
HCT VFR BLD CALC: 22.1 % — LOW (ref 37–47)
HCT VFR BLD CALC: 22.8 % — LOW (ref 37–47)
HCT VFR BLD CALC: 23.1 % — LOW (ref 37–47)
HCT VFR BLD CALC: 25.2 % — LOW (ref 37–47)
HCT VFR BLD CALC: 27.8 % — LOW (ref 37–47)
HCT VFR BLD CALC: 28 % — LOW (ref 37–47)
HCT VFR BLD CALC: 30.5 % — LOW (ref 37–47)
HCT VFR BLD CALC: 30.9 % — LOW (ref 37–47)
HCT VFR BLD CALC: 31.8 % — LOW (ref 37–47)
HCT VFR BLD CALC: 39.5 %
HCT VFR BLDA CALC: 30 % — LOW (ref 39–51)
HDLC SERPL-MCNC: 68 MG/DL
HGB BLD CALC-MCNC: 10.1 G/DL — LOW (ref 12.6–17.4)
HGB BLD-MCNC: 10 G/DL — LOW (ref 12–16)
HGB BLD-MCNC: 10 G/DL — LOW (ref 12–16)
HGB BLD-MCNC: 10.2 G/DL — LOW (ref 12–16)
HGB BLD-MCNC: 12.8 G/DL
HGB BLD-MCNC: 7.3 G/DL — LOW (ref 12–16)
HGB BLD-MCNC: 7.5 G/DL — LOW (ref 12–16)
HGB BLD-MCNC: 7.6 G/DL — LOW (ref 12–16)
HGB BLD-MCNC: 8.3 G/DL — LOW (ref 12–16)
HGB BLD-MCNC: 8.8 G/DL — LOW (ref 12–16)
HGB BLD-MCNC: 9.2 G/DL — LOW (ref 12–16)
IMM GRANULOCYTES NFR BLD AUTO: 0.2 %
IMM GRANULOCYTES NFR BLD AUTO: 0.3 % — SIGNIFICANT CHANGE UP (ref 0.1–0.3)
IMM GRANULOCYTES NFR BLD AUTO: 0.3 % — SIGNIFICANT CHANGE UP (ref 0.1–0.3)
IMM GRANULOCYTES NFR BLD AUTO: 0.4 % — HIGH (ref 0.1–0.3)
IMM GRANULOCYTES NFR BLD AUTO: 0.5 % — HIGH (ref 0.1–0.3)
IMM GRANULOCYTES NFR BLD AUTO: 0.6 % — HIGH (ref 0.1–0.3)
IMM GRANULOCYTES NFR BLD AUTO: 0.6 % — HIGH (ref 0.1–0.3)
INR BLD: 0.96 RATIO — SIGNIFICANT CHANGE UP (ref 0.65–1.3)
LACTATE BLDV-MCNC: 1.3 MMOL/L — SIGNIFICANT CHANGE UP (ref 0.5–2)
LACTATE SERPL-SCNC: 1.3 MMOL/L — SIGNIFICANT CHANGE UP (ref 0.7–2)
LDLC SERPL CALC-MCNC: 72 MG/DL
LYMPHOCYTES # BLD AUTO: 1.22 K/UL — SIGNIFICANT CHANGE UP (ref 1.2–3.4)
LYMPHOCYTES # BLD AUTO: 1.26 K/UL — SIGNIFICANT CHANGE UP (ref 1.2–3.4)
LYMPHOCYTES # BLD AUTO: 1.35 K/UL — SIGNIFICANT CHANGE UP (ref 1.2–3.4)
LYMPHOCYTES # BLD AUTO: 1.35 K/UL — SIGNIFICANT CHANGE UP (ref 1.2–3.4)
LYMPHOCYTES # BLD AUTO: 1.38 K/UL — SIGNIFICANT CHANGE UP (ref 1.2–3.4)
LYMPHOCYTES # BLD AUTO: 1.81 K/UL — SIGNIFICANT CHANGE UP (ref 1.2–3.4)
LYMPHOCYTES # BLD AUTO: 1.86 K/UL — SIGNIFICANT CHANGE UP (ref 1.2–3.4)
LYMPHOCYTES # BLD AUTO: 17.2 % — LOW (ref 20.5–51.1)
LYMPHOCYTES # BLD AUTO: 17.2 % — LOW (ref 20.5–51.1)
LYMPHOCYTES # BLD AUTO: 17.8 % — LOW (ref 20.5–51.1)
LYMPHOCYTES # BLD AUTO: 2.04 K/UL — SIGNIFICANT CHANGE UP (ref 1.2–3.4)
LYMPHOCYTES # BLD AUTO: 2.14 K/UL
LYMPHOCYTES # BLD AUTO: 21.4 % — SIGNIFICANT CHANGE UP (ref 20.5–51.1)
LYMPHOCYTES # BLD AUTO: 22.3 % — SIGNIFICANT CHANGE UP (ref 20.5–51.1)
LYMPHOCYTES # BLD AUTO: 22.8 % — SIGNIFICANT CHANGE UP (ref 20.5–51.1)
LYMPHOCYTES # BLD AUTO: 25.6 % — SIGNIFICANT CHANGE UP (ref 20.5–51.1)
LYMPHOCYTES # BLD AUTO: 29.6 % — SIGNIFICANT CHANGE UP (ref 20.5–51.1)
LYMPHOCYTES NFR BLD AUTO: 26.4 %
MAGNESIUM SERPL-MCNC: 1.7 MG/DL — LOW (ref 1.8–2.4)
MAGNESIUM SERPL-MCNC: 1.8 MG/DL — SIGNIFICANT CHANGE UP (ref 1.8–2.4)
MAGNESIUM SERPL-MCNC: 1.9 MG/DL — SIGNIFICANT CHANGE UP (ref 1.8–2.4)
MAGNESIUM SERPL-MCNC: 1.9 MG/DL — SIGNIFICANT CHANGE UP (ref 1.8–2.4)
MAGNESIUM SERPL-MCNC: 2 MG/DL — SIGNIFICANT CHANGE UP (ref 1.8–2.4)
MAGNESIUM SERPL-MCNC: 2.1 MG/DL — SIGNIFICANT CHANGE UP (ref 1.8–2.4)
MAN DIFF?: NORMAL
MCHC RBC-ENTMCNC: 31.1 PG
MCHC RBC-ENTMCNC: 31.4 G/DL — LOW (ref 32–37)
MCHC RBC-ENTMCNC: 31.4 PG — HIGH (ref 27–31)
MCHC RBC-ENTMCNC: 31.7 G/DL — LOW (ref 32–37)
MCHC RBC-ENTMCNC: 31.7 PG — HIGH (ref 27–31)
MCHC RBC-ENTMCNC: 31.8 PG — HIGH (ref 27–31)
MCHC RBC-ENTMCNC: 31.8 PG — HIGH (ref 27–31)
MCHC RBC-ENTMCNC: 31.9 PG — HIGH (ref 27–31)
MCHC RBC-ENTMCNC: 31.9 PG — HIGH (ref 27–31)
MCHC RBC-ENTMCNC: 32.2 PG — HIGH (ref 27–31)
MCHC RBC-ENTMCNC: 32.4 G/DL
MCHC RBC-ENTMCNC: 32.5 PG — HIGH (ref 27–31)
MCHC RBC-ENTMCNC: 32.7 PG — HIGH (ref 27–31)
MCHC RBC-ENTMCNC: 32.8 G/DL — SIGNIFICANT CHANGE UP (ref 32–37)
MCHC RBC-ENTMCNC: 32.9 G/DL — SIGNIFICANT CHANGE UP (ref 32–37)
MCHC RBC-ENTMCNC: 33 G/DL — SIGNIFICANT CHANGE UP (ref 32–37)
MCHC RBC-ENTMCNC: 33 G/DL — SIGNIFICANT CHANGE UP (ref 32–37)
MCV RBC AUTO: 100 FL — HIGH (ref 81–99)
MCV RBC AUTO: 101.3 FL — HIGH (ref 81–99)
MCV RBC AUTO: 95.9 FL
MCV RBC AUTO: 95.9 FL — SIGNIFICANT CHANGE UP (ref 81–99)
MCV RBC AUTO: 96.3 FL — SIGNIFICANT CHANGE UP (ref 81–99)
MCV RBC AUTO: 97.1 FL — SIGNIFICANT CHANGE UP (ref 81–99)
MCV RBC AUTO: 97.2 FL — SIGNIFICANT CHANGE UP (ref 81–99)
MCV RBC AUTO: 97.9 FL — SIGNIFICANT CHANGE UP (ref 81–99)
MCV RBC AUTO: 98.8 FL — SIGNIFICANT CHANGE UP (ref 81–99)
MCV RBC AUTO: 99.1 FL — HIGH (ref 81–99)
MONOCYTES # BLD AUTO: 0.37 K/UL — SIGNIFICANT CHANGE UP (ref 0.1–0.6)
MONOCYTES # BLD AUTO: 0.38 K/UL — SIGNIFICANT CHANGE UP (ref 0.1–0.6)
MONOCYTES # BLD AUTO: 0.39 K/UL — SIGNIFICANT CHANGE UP (ref 0.1–0.6)
MONOCYTES # BLD AUTO: 0.42 K/UL — SIGNIFICANT CHANGE UP (ref 0.1–0.6)
MONOCYTES # BLD AUTO: 0.43 K/UL — SIGNIFICANT CHANGE UP (ref 0.1–0.6)
MONOCYTES # BLD AUTO: 0.45 K/UL — SIGNIFICANT CHANGE UP (ref 0.1–0.6)
MONOCYTES # BLD AUTO: 0.47 K/UL — SIGNIFICANT CHANGE UP (ref 0.1–0.6)
MONOCYTES # BLD AUTO: 0.6 K/UL
MONOCYTES # BLD AUTO: 0.61 K/UL — HIGH (ref 0.1–0.6)
MONOCYTES NFR BLD AUTO: 4.7 % — SIGNIFICANT CHANGE UP (ref 1.7–9.3)
MONOCYTES NFR BLD AUTO: 5.4 % — SIGNIFICANT CHANGE UP (ref 1.7–9.3)
MONOCYTES NFR BLD AUTO: 5.6 % — SIGNIFICANT CHANGE UP (ref 1.7–9.3)
MONOCYTES NFR BLD AUTO: 6.4 % — SIGNIFICANT CHANGE UP (ref 1.7–9.3)
MONOCYTES NFR BLD AUTO: 6.5 % — SIGNIFICANT CHANGE UP (ref 1.7–9.3)
MONOCYTES NFR BLD AUTO: 6.7 % — SIGNIFICANT CHANGE UP (ref 1.7–9.3)
MONOCYTES NFR BLD AUTO: 6.7 % — SIGNIFICANT CHANGE UP (ref 1.7–9.3)
MONOCYTES NFR BLD AUTO: 7.4 %
MONOCYTES NFR BLD AUTO: 8.1 % — SIGNIFICANT CHANGE UP (ref 1.7–9.3)
NEUTROPHILS # BLD AUTO: 3.59 K/UL — SIGNIFICANT CHANGE UP (ref 1.4–6.5)
NEUTROPHILS # BLD AUTO: 3.76 K/UL — SIGNIFICANT CHANGE UP (ref 1.4–6.5)
NEUTROPHILS # BLD AUTO: 4.44 K/UL — SIGNIFICANT CHANGE UP (ref 1.4–6.5)
NEUTROPHILS # BLD AUTO: 4.8 K/UL — SIGNIFICANT CHANGE UP (ref 1.4–6.5)
NEUTROPHILS # BLD AUTO: 5.22 K/UL
NEUTROPHILS # BLD AUTO: 5.35 K/UL — SIGNIFICANT CHANGE UP (ref 1.4–6.5)
NEUTROPHILS # BLD AUTO: 6.09 K/UL — SIGNIFICANT CHANGE UP (ref 1.4–6.5)
NEUTROPHILS # BLD AUTO: 6.15 K/UL — SIGNIFICANT CHANGE UP (ref 1.4–6.5)
NEUTROPHILS # BLD AUTO: 6.4 K/UL — SIGNIFICANT CHANGE UP (ref 1.4–6.5)
NEUTROPHILS NFR BLD AUTO: 61.5 % — SIGNIFICANT CHANGE UP (ref 42.2–75.2)
NEUTROPHILS NFR BLD AUTO: 64.5 %
NEUTROPHILS NFR BLD AUTO: 66.1 % — SIGNIFICANT CHANGE UP (ref 42.2–75.2)
NEUTROPHILS NFR BLD AUTO: 67.2 % — SIGNIFICANT CHANGE UP (ref 42.2–75.2)
NEUTROPHILS NFR BLD AUTO: 70.1 % — SIGNIFICANT CHANGE UP (ref 42.2–75.2)
NEUTROPHILS NFR BLD AUTO: 70.4 % — SIGNIFICANT CHANGE UP (ref 42.2–75.2)
NEUTROPHILS NFR BLD AUTO: 75.4 % — HIGH (ref 42.2–75.2)
NEUTROPHILS NFR BLD AUTO: 76.5 % — HIGH (ref 42.2–75.2)
NEUTROPHILS NFR BLD AUTO: 77.6 % — HIGH (ref 42.2–75.2)
NONHDLC SERPL-MCNC: 87 MG/DL
NRBC # BLD: 0 /100 WBCS — SIGNIFICANT CHANGE UP (ref 0–0)
NT-PROBNP SERPL-SCNC: 1126 PG/ML — HIGH (ref 0–300)
PCO2 BLDV: 67 MMHG — HIGH (ref 39–42)
PH BLDV: 7.38 — SIGNIFICANT CHANGE UP (ref 7.32–7.43)
PHOSPHATE SERPL-MCNC: 1.5 MG/DL — LOW (ref 2.1–4.9)
PHOSPHATE SERPL-MCNC: 2.3 MG/DL — SIGNIFICANT CHANGE UP (ref 2.1–4.9)
PHOSPHATE SERPL-MCNC: 2.4 MG/DL — SIGNIFICANT CHANGE UP (ref 2.1–4.9)
PHOSPHATE SERPL-MCNC: 2.6 MG/DL — SIGNIFICANT CHANGE UP (ref 2.1–4.9)
PLATELET # BLD AUTO: 100 K/UL — LOW (ref 130–400)
PLATELET # BLD AUTO: 105 K/UL — LOW (ref 130–400)
PLATELET # BLD AUTO: 106 K/UL — LOW (ref 130–400)
PLATELET # BLD AUTO: 107 K/UL — LOW (ref 130–400)
PLATELET # BLD AUTO: 117 K/UL — LOW (ref 130–400)
PLATELET # BLD AUTO: 125 K/UL — LOW (ref 130–400)
PLATELET # BLD AUTO: 125 K/UL — LOW (ref 130–400)
PLATELET # BLD AUTO: 126 K/UL — LOW (ref 130–400)
PLATELET # BLD AUTO: 130 K/UL — SIGNIFICANT CHANGE UP (ref 130–400)
PLATELET # BLD AUTO: 184 K/UL
PO2 BLDV: 22 MMHG — SIGNIFICANT CHANGE UP
POTASSIUM BLDV-SCNC: 5.7 MMOL/L — HIGH (ref 3.5–5.1)
POTASSIUM SERPL-MCNC: 2.9 MMOL/L — LOW (ref 3.5–5)
POTASSIUM SERPL-MCNC: 3.1 MMOL/L — LOW (ref 3.5–5)
POTASSIUM SERPL-MCNC: 3.4 MMOL/L — LOW (ref 3.5–5)
POTASSIUM SERPL-MCNC: 3.9 MMOL/L — SIGNIFICANT CHANGE UP (ref 3.5–5)
POTASSIUM SERPL-MCNC: 3.9 MMOL/L — SIGNIFICANT CHANGE UP (ref 3.5–5)
POTASSIUM SERPL-MCNC: 4.2 MMOL/L — SIGNIFICANT CHANGE UP (ref 3.5–5)
POTASSIUM SERPL-MCNC: 4.3 MMOL/L — SIGNIFICANT CHANGE UP (ref 3.5–5)
POTASSIUM SERPL-MCNC: 4.5 MMOL/L — SIGNIFICANT CHANGE UP (ref 3.5–5)
POTASSIUM SERPL-MCNC: 4.6 MMOL/L — SIGNIFICANT CHANGE UP (ref 3.5–5)
POTASSIUM SERPL-MCNC: 5 MMOL/L — SIGNIFICANT CHANGE UP (ref 3.5–5)
POTASSIUM SERPL-MCNC: 5.2 MMOL/L — HIGH (ref 3.5–5)
POTASSIUM SERPL-MCNC: 5.3 MMOL/L — HIGH (ref 3.5–5)
POTASSIUM SERPL-MCNC: 5.4 MMOL/L — HIGH (ref 3.5–5)
POTASSIUM SERPL-MCNC: 6.1 MMOL/L — CRITICAL HIGH (ref 3.5–5)
POTASSIUM SERPL-SCNC: 2.9 MMOL/L — LOW (ref 3.5–5)
POTASSIUM SERPL-SCNC: 3.1 MMOL/L — LOW (ref 3.5–5)
POTASSIUM SERPL-SCNC: 3.4 MMOL/L — LOW (ref 3.5–5)
POTASSIUM SERPL-SCNC: 3.9 MMOL/L — SIGNIFICANT CHANGE UP (ref 3.5–5)
POTASSIUM SERPL-SCNC: 3.9 MMOL/L — SIGNIFICANT CHANGE UP (ref 3.5–5)
POTASSIUM SERPL-SCNC: 4.2 MMOL/L — SIGNIFICANT CHANGE UP (ref 3.5–5)
POTASSIUM SERPL-SCNC: 4.3 MMOL/L — SIGNIFICANT CHANGE UP (ref 3.5–5)
POTASSIUM SERPL-SCNC: 4.5 MMOL/L — SIGNIFICANT CHANGE UP (ref 3.5–5)
POTASSIUM SERPL-SCNC: 4.6 MMOL/L
POTASSIUM SERPL-SCNC: 4.6 MMOL/L — SIGNIFICANT CHANGE UP (ref 3.5–5)
POTASSIUM SERPL-SCNC: 5 MMOL/L — SIGNIFICANT CHANGE UP (ref 3.5–5)
POTASSIUM SERPL-SCNC: 5.2 MMOL/L — HIGH (ref 3.5–5)
POTASSIUM SERPL-SCNC: 5.3 MMOL/L — HIGH (ref 3.5–5)
POTASSIUM SERPL-SCNC: 5.4 MMOL/L — HIGH (ref 3.5–5)
POTASSIUM SERPL-SCNC: 6.1 MMOL/L — CRITICAL HIGH (ref 3.5–5)
PROCALCITONIN SERPL-MCNC: 0.26 NG/ML — HIGH (ref 0.02–0.1)
PROT SERPL-MCNC: 4.3 G/DL — LOW (ref 6–8)
PROT SERPL-MCNC: 4.5 G/DL — LOW (ref 6–8)
PROT SERPL-MCNC: 4.6 G/DL — LOW (ref 6–8)
PROT SERPL-MCNC: 4.7 G/DL — LOW (ref 6–8)
PROT SERPL-MCNC: 4.8 G/DL — LOW (ref 6–8)
PROT SERPL-MCNC: 5.2 G/DL — LOW (ref 6–8)
PROT SERPL-MCNC: 5.4 G/DL — LOW (ref 6–8)
PROT SERPL-MCNC: 5.7 G/DL — LOW (ref 6–8)
PROT SERPL-MCNC: 6.1 G/DL
PROTHROM AB SERPL-ACNC: 11.1 SEC — SIGNIFICANT CHANGE UP (ref 9.95–12.87)
RBC # BLD: 2.23 M/UL — LOW (ref 4.2–5.4)
RBC # BLD: 2.33 M/UL — LOW (ref 4.2–5.4)
RBC # BLD: 2.38 M/UL — LOW (ref 4.2–5.4)
RBC # BLD: 2.55 M/UL — LOW (ref 4.2–5.4)
RBC # BLD: 2.78 M/UL — LOW (ref 4.2–5.4)
RBC # BLD: 2.88 M/UL — LOW (ref 4.2–5.4)
RBC # BLD: 3.14 M/UL — LOW (ref 4.2–5.4)
RBC # BLD: 3.18 M/UL — LOW (ref 4.2–5.4)
RBC # BLD: 3.21 M/UL — LOW (ref 4.2–5.4)
RBC # BLD: 4.12 M/UL
RBC # FLD: 13.3 %
RBC # FLD: 15.1 % — HIGH (ref 11.5–14.5)
RBC # FLD: 15.2 % — HIGH (ref 11.5–14.5)
RBC # FLD: 15.5 % — HIGH (ref 11.5–14.5)
RBC # FLD: 15.5 % — HIGH (ref 11.5–14.5)
RBC # FLD: 15.6 % — HIGH (ref 11.5–14.5)
RBC # FLD: 15.7 % — HIGH (ref 11.5–14.5)
RBC # FLD: 15.7 % — HIGH (ref 11.5–14.5)
SAO2 % BLDV: 27.7 % — SIGNIFICANT CHANGE UP
SARS-COV-2 RNA SPEC QL NAA+PROBE: SIGNIFICANT CHANGE UP
SODIUM SERPL-SCNC: 134 MMOL/L — LOW (ref 135–146)
SODIUM SERPL-SCNC: 135 MMOL/L — SIGNIFICANT CHANGE UP (ref 135–146)
SODIUM SERPL-SCNC: 136 MMOL/L — SIGNIFICANT CHANGE UP (ref 135–146)
SODIUM SERPL-SCNC: 136 MMOL/L — SIGNIFICANT CHANGE UP (ref 135–146)
SODIUM SERPL-SCNC: 137 MMOL/L
SODIUM SERPL-SCNC: 138 MMOL/L — SIGNIFICANT CHANGE UP (ref 135–146)
SODIUM SERPL-SCNC: 139 MMOL/L — SIGNIFICANT CHANGE UP (ref 135–146)
SODIUM SERPL-SCNC: 141 MMOL/L — SIGNIFICANT CHANGE UP (ref 135–146)
SODIUM SERPL-SCNC: 147 MMOL/L — HIGH (ref 135–146)
SODIUM SERPL-SCNC: 147 MMOL/L — HIGH (ref 135–146)
SODIUM SERPL-SCNC: 148 MMOL/L — HIGH (ref 135–146)
SODIUM SERPL-SCNC: 149 MMOL/L — HIGH (ref 135–146)
SODIUM SERPL-SCNC: 152 MMOL/L — HIGH (ref 135–146)
SPECIMEN SOURCE: SIGNIFICANT CHANGE UP
TRIGL SERPL-MCNC: 93 MG/DL
TROPONIN T SERPL-MCNC: 0.08 NG/ML — CRITICAL HIGH
TROPONIN T SERPL-MCNC: 0.08 NG/ML — CRITICAL HIGH
TSH SERPL-ACNC: 0.54 UIU/ML
WBC # BLD: 5.34 K/UL — SIGNIFICANT CHANGE UP (ref 4.8–10.8)
WBC # BLD: 6.11 K/UL — SIGNIFICANT CHANGE UP (ref 4.8–10.8)
WBC # BLD: 6.3 K/UL — SIGNIFICANT CHANGE UP (ref 4.8–10.8)
WBC # BLD: 6.4 K/UL — SIGNIFICANT CHANGE UP (ref 4.8–10.8)
WBC # BLD: 7.09 K/UL — SIGNIFICANT CHANGE UP (ref 4.8–10.8)
WBC # BLD: 7.26 K/UL — SIGNIFICANT CHANGE UP (ref 4.8–10.8)
WBC # BLD: 7.85 K/UL — SIGNIFICANT CHANGE UP (ref 4.8–10.8)
WBC # BLD: 8.04 K/UL — SIGNIFICANT CHANGE UP (ref 4.8–10.8)
WBC # BLD: 9.14 K/UL — SIGNIFICANT CHANGE UP (ref 4.8–10.8)
WBC # FLD AUTO: 5.34 K/UL — SIGNIFICANT CHANGE UP (ref 4.8–10.8)
WBC # FLD AUTO: 6.11 K/UL — SIGNIFICANT CHANGE UP (ref 4.8–10.8)
WBC # FLD AUTO: 6.3 K/UL — SIGNIFICANT CHANGE UP (ref 4.8–10.8)
WBC # FLD AUTO: 6.4 K/UL — SIGNIFICANT CHANGE UP (ref 4.8–10.8)
WBC # FLD AUTO: 7.09 K/UL — SIGNIFICANT CHANGE UP (ref 4.8–10.8)
WBC # FLD AUTO: 7.26 K/UL — SIGNIFICANT CHANGE UP (ref 4.8–10.8)
WBC # FLD AUTO: 7.85 K/UL — SIGNIFICANT CHANGE UP (ref 4.8–10.8)
WBC # FLD AUTO: 8.04 K/UL — SIGNIFICANT CHANGE UP (ref 4.8–10.8)
WBC # FLD AUTO: 8.1 K/UL
WBC # FLD AUTO: 9.14 K/UL — SIGNIFICANT CHANGE UP (ref 4.8–10.8)

## 2022-01-01 PROCEDURE — 99223 1ST HOSP IP/OBS HIGH 75: CPT

## 2022-01-01 PROCEDURE — 99231 SBSQ HOSP IP/OBS SF/LOW 25: CPT

## 2022-01-01 PROCEDURE — 93306 TTE W/DOPPLER COMPLETE: CPT

## 2022-01-01 PROCEDURE — 99214 OFFICE O/P EST MOD 30 MIN: CPT

## 2022-01-01 PROCEDURE — 99233 SBSQ HOSP IP/OBS HIGH 50: CPT

## 2022-01-01 PROCEDURE — 99215 OFFICE O/P EST HI 40 MIN: CPT | Mod: 25

## 2022-01-01 PROCEDURE — 99291 CRITICAL CARE FIRST HOUR: CPT

## 2022-01-01 PROCEDURE — 99221 1ST HOSP IP/OBS SF/LOW 40: CPT

## 2022-01-01 PROCEDURE — 99232 SBSQ HOSP IP/OBS MODERATE 35: CPT

## 2022-01-01 PROCEDURE — 99222 1ST HOSP IP/OBS MODERATE 55: CPT

## 2022-01-01 PROCEDURE — 74230 X-RAY XM SWLNG FUNCJ C+: CPT | Mod: 26

## 2022-01-01 PROCEDURE — 97166 OT EVAL MOD COMPLEX 45 MIN: CPT | Mod: GO

## 2022-01-01 PROCEDURE — 93000 ELECTROCARDIOGRAM COMPLETE: CPT

## 2022-01-01 PROCEDURE — 71045 X-RAY EXAM CHEST 1 VIEW: CPT | Mod: 26

## 2022-01-01 PROCEDURE — 78452 HT MUSCLE IMAGE SPECT MULT: CPT | Mod: 26,MH

## 2022-01-01 PROCEDURE — 93010 ELECTROCARDIOGRAM REPORT: CPT

## 2022-01-01 PROCEDURE — 93970 EXTREMITY STUDY: CPT | Mod: 26

## 2022-01-01 PROCEDURE — 71275 CT ANGIOGRAPHY CHEST: CPT | Mod: 26,MA

## 2022-01-01 PROCEDURE — 99285 EMERGENCY DEPT VISIT HI MDM: CPT | Mod: GC

## 2022-01-01 PROCEDURE — 99204 OFFICE O/P NEW MOD 45 MIN: CPT

## 2022-01-01 PROCEDURE — 93306 TTE W/DOPPLER COMPLETE: CPT | Mod: 26

## 2022-01-01 PROCEDURE — 99239 HOSP IP/OBS DSCHRG MGMT >30: CPT

## 2022-01-01 PROCEDURE — 99497 ADVNCD CARE PLAN 30 MIN: CPT

## 2022-01-01 PROCEDURE — 99213 OFFICE O/P EST LOW 20 MIN: CPT

## 2022-01-01 RX ORDER — ALPRAZOLAM 0.25 MG
0.5 TABLET ORAL
Refills: 0 | Status: DISCONTINUED | OUTPATIENT
Start: 2022-01-01 | End: 2022-01-01

## 2022-01-01 RX ORDER — HYDROXYZINE HCL 10 MG
25 TABLET ORAL
Refills: 0 | Status: DISCONTINUED | OUTPATIENT
Start: 2022-01-01 | End: 2022-01-01

## 2022-01-01 RX ORDER — SODIUM CHLORIDE 9 MG/ML
1000 INJECTION, SOLUTION INTRAVENOUS
Refills: 0 | Status: DISCONTINUED | OUTPATIENT
Start: 2022-01-01 | End: 2022-01-01

## 2022-01-01 RX ORDER — SODIUM ZIRCONIUM CYCLOSILICATE 10 G/10G
10 POWDER, FOR SUSPENSION ORAL ONCE
Refills: 0 | Status: COMPLETED | OUTPATIENT
Start: 2022-01-01 | End: 2022-01-01

## 2022-01-01 RX ORDER — DEXTROSE 50 % IN WATER 50 %
12.5 SYRINGE (ML) INTRAVENOUS ONCE
Refills: 0 | Status: DISCONTINUED | OUTPATIENT
Start: 2022-01-01 | End: 2022-01-01

## 2022-01-01 RX ORDER — APIXABAN 2.5 MG/1
1 TABLET, FILM COATED ORAL
Qty: 74 | Refills: 0
Start: 2022-01-01 | End: 2022-10-19

## 2022-01-01 RX ORDER — LOSARTAN POTASSIUM 25 MG/1
25 TABLET, FILM COATED ORAL DAILY
Qty: 30 | Refills: 3 | Status: ACTIVE | COMMUNITY
Start: 2019-03-11

## 2022-01-01 RX ORDER — DEXTROSE 50 % IN WATER 50 %
15 SYRINGE (ML) INTRAVENOUS ONCE
Refills: 0 | Status: DISCONTINUED | OUTPATIENT
Start: 2022-01-01 | End: 2022-01-01

## 2022-01-01 RX ORDER — ALPRAZOLAM 0.25 MG
0.25 TABLET ORAL
Refills: 0 | Status: DISCONTINUED | OUTPATIENT
Start: 2022-01-01 | End: 2022-01-01

## 2022-01-01 RX ORDER — ONDANSETRON 8 MG/1
4 TABLET, FILM COATED ORAL EVERY 8 HOURS
Refills: 0 | Status: DISCONTINUED | OUTPATIENT
Start: 2022-01-01 | End: 2022-01-01

## 2022-01-01 RX ORDER — POTASSIUM CHLORIDE 20 MEQ
20 PACKET (EA) ORAL
Refills: 0 | Status: COMPLETED | OUTPATIENT
Start: 2022-01-01 | End: 2022-01-01

## 2022-01-01 RX ORDER — APIXABAN 2.5 MG/1
10 TABLET, FILM COATED ORAL EVERY 12 HOURS
Refills: 0 | Status: DISCONTINUED | OUTPATIENT
Start: 2022-01-01 | End: 2022-01-01

## 2022-01-01 RX ORDER — METOPROLOL SUCCINATE 50 MG/1
50 TABLET, EXTENDED RELEASE ORAL
Qty: 90 | Refills: 2 | Status: ACTIVE | COMMUNITY
Start: 2019-08-06

## 2022-01-01 RX ORDER — INSULIN LISPRO 100/ML
VIAL (ML) SUBCUTANEOUS
Refills: 0 | Status: DISCONTINUED | OUTPATIENT
Start: 2022-01-01 | End: 2022-01-01

## 2022-01-01 RX ORDER — ATORVASTATIN CALCIUM 80 MG/1
10 TABLET, FILM COATED ORAL DAILY
Refills: 0 | Status: DISCONTINUED | OUTPATIENT
Start: 2022-01-01 | End: 2022-01-01

## 2022-01-01 RX ORDER — LOSARTAN POTASSIUM 50 MG/1
50 TABLET, FILM COATED ORAL DAILY
Qty: 30 | Refills: 6 | Status: DISCONTINUED | COMMUNITY
End: 2022-01-01

## 2022-01-01 RX ORDER — DEXTROSE 50 % IN WATER 50 %
25 SYRINGE (ML) INTRAVENOUS ONCE
Refills: 0 | Status: DISCONTINUED | OUTPATIENT
Start: 2022-01-01 | End: 2022-01-01

## 2022-01-01 RX ORDER — AMPICILLIN SODIUM AND SULBACTAM SODIUM 250; 125 MG/ML; MG/ML
1.5 INJECTION, POWDER, FOR SUSPENSION INTRAMUSCULAR; INTRAVENOUS ONCE
Refills: 0 | Status: COMPLETED | OUTPATIENT
Start: 2022-01-01 | End: 2022-01-01

## 2022-01-01 RX ORDER — ALPRAZOLAM 0.25 MG
0.25 TABLET ORAL ONCE
Refills: 0 | Status: DISCONTINUED | OUTPATIENT
Start: 2022-01-01 | End: 2022-01-01

## 2022-01-01 RX ORDER — POTASSIUM PHOSPHATE, MONOBASIC POTASSIUM PHOSPHATE, DIBASIC 236; 224 MG/ML; MG/ML
15 INJECTION, SOLUTION INTRAVENOUS ONCE
Refills: 0 | Status: COMPLETED | OUTPATIENT
Start: 2022-01-01 | End: 2022-01-01

## 2022-01-01 RX ORDER — LOSARTAN POTASSIUM 100 MG/1
25 TABLET, FILM COATED ORAL DAILY
Refills: 0 | Status: DISCONTINUED | OUTPATIENT
Start: 2022-01-01 | End: 2022-01-01

## 2022-01-01 RX ORDER — APIXABAN 2.5 MG/1
1 TABLET, FILM COATED ORAL
Qty: 60 | Refills: 0
Start: 2022-01-01 | End: 2022-10-19

## 2022-01-01 RX ORDER — ALPRAZOLAM 0.25 MG
0.25 TABLET ORAL THREE TIMES A DAY
Refills: 0 | Status: DISCONTINUED | OUTPATIENT
Start: 2022-01-01 | End: 2022-01-01

## 2022-01-01 RX ORDER — SCOPOLAMINE 1.5 MG/1
1 PATCH, EXTENDED RELEASE TRANSDERMAL
Qty: 10 | Refills: 0 | Status: ACTIVE | COMMUNITY
Start: 2022-01-01 | End: 1900-01-01

## 2022-01-01 RX ORDER — MAGNESIUM SULFATE 500 MG/ML
2 VIAL (ML) INJECTION ONCE
Refills: 0 | Status: COMPLETED | OUTPATIENT
Start: 2022-01-01 | End: 2022-01-01

## 2022-01-01 RX ORDER — RILUZOLE 50 MG/1
1 TABLET ORAL
Qty: 0 | Refills: 0 | DISCHARGE

## 2022-01-01 RX ORDER — ALPRAZOLAM 0.25 MG
1 TABLET ORAL
Qty: 90 | Refills: 0
Start: 2022-01-01 | End: 2022-10-24

## 2022-01-01 RX ORDER — SCOPALAMINE 1 MG/3D
1 PATCH, EXTENDED RELEASE TRANSDERMAL
Refills: 0 | Status: DISCONTINUED | OUTPATIENT
Start: 2022-01-01 | End: 2022-01-01

## 2022-01-01 RX ORDER — MORPHINE SULFATE 50 MG/1
2 CAPSULE, EXTENDED RELEASE ORAL ONCE
Refills: 0 | Status: DISCONTINUED | OUTPATIENT
Start: 2022-01-01 | End: 2022-01-01

## 2022-01-01 RX ORDER — ACETAMINOPHEN 500 MG
650 TABLET ORAL EVERY 6 HOURS
Refills: 0 | Status: DISCONTINUED | OUTPATIENT
Start: 2022-01-01 | End: 2022-01-01

## 2022-01-01 RX ORDER — CEFTRIAXONE 500 MG/1
1000 INJECTION, POWDER, FOR SOLUTION INTRAMUSCULAR; INTRAVENOUS ONCE
Refills: 0 | Status: COMPLETED | OUTPATIENT
Start: 2022-01-01 | End: 2022-01-01

## 2022-01-01 RX ORDER — BENZOCAINE 10 %
1 GEL (GRAM) MUCOUS MEMBRANE THREE TIMES A DAY
Refills: 0 | Status: DISCONTINUED | OUTPATIENT
Start: 2022-01-01 | End: 2022-01-01

## 2022-01-01 RX ORDER — GLUCAGON INJECTION, SOLUTION 0.5 MG/.1ML
1 INJECTION, SOLUTION SUBCUTANEOUS ONCE
Refills: 0 | Status: DISCONTINUED | OUTPATIENT
Start: 2022-01-01 | End: 2022-01-01

## 2022-01-01 RX ORDER — PANTOPRAZOLE SODIUM 20 MG/1
40 TABLET, DELAYED RELEASE ORAL ONCE
Refills: 0 | Status: COMPLETED | OUTPATIENT
Start: 2022-01-01 | End: 2022-01-01

## 2022-01-01 RX ORDER — EDARAVONE 105 MG/5ML
105 KIT ORAL
Qty: 1 | Refills: 0 | Status: DISCONTINUED | COMMUNITY
Start: 2022-01-01 | End: 2022-01-01

## 2022-01-01 RX ORDER — METFORMIN HYDROCHLORIDE 500 MG/1
500 TABLET, COATED ORAL TWICE DAILY
Qty: 180 | Refills: 0 | Status: ACTIVE | COMMUNITY
Start: 2019-02-03

## 2022-01-01 RX ORDER — POTASSIUM CHLORIDE 20 MEQ
40 PACKET (EA) ORAL EVERY 4 HOURS
Refills: 0 | Status: COMPLETED | OUTPATIENT
Start: 2022-01-01 | End: 2022-01-01

## 2022-01-01 RX ORDER — APIXABAN 2.5 MG/1
1 TABLET, FILM COATED ORAL
Qty: 74 | Refills: 0
Start: 2022-01-01 | End: 2022-10-24

## 2022-01-01 RX ORDER — AMLODIPINE BESYLATE 5 MG/1
5 TABLET ORAL
Qty: 90 | Refills: 3 | Status: DISCONTINUED | COMMUNITY
Start: 2022-01-01 | End: 2022-01-01

## 2022-01-01 RX ORDER — TRIAMTERENE AND HYDROCHLOROTHIAZIDE 37.5; 25 MG/1; MG/1
37.5-25 CAPSULE ORAL DAILY
Qty: 90 | Refills: 1 | Status: DISCONTINUED | COMMUNITY
Start: 2019-03-05 | End: 2022-01-01

## 2022-01-01 RX ORDER — DAPAGLIFLOZIN 5 MG/1
5 TABLET, FILM COATED ORAL DAILY
Qty: 90 | Refills: 0 | Status: ACTIVE | COMMUNITY
Start: 2019-02-03

## 2022-01-01 RX ORDER — ALPRAZOLAM 0.25 MG
1 TABLET ORAL
Qty: 6 | Refills: 0
Start: 2022-01-01 | End: 2022-01-01

## 2022-01-01 RX ORDER — AMPICILLIN SODIUM AND SULBACTAM SODIUM 250; 125 MG/ML; MG/ML
1.5 INJECTION, POWDER, FOR SUSPENSION INTRAMUSCULAR; INTRAVENOUS EVERY 6 HOURS
Refills: 0 | Status: DISCONTINUED | OUTPATIENT
Start: 2022-01-01 | End: 2022-01-01

## 2022-01-01 RX ORDER — KETOROLAC TROMETHAMINE 30 MG/ML
15 SYRINGE (ML) INJECTION ONCE
Refills: 0 | Status: DISCONTINUED | OUTPATIENT
Start: 2022-01-01 | End: 2022-01-01

## 2022-01-01 RX ORDER — DEXTROSE 50 % IN WATER 50 %
50 SYRINGE (ML) INTRAVENOUS ONCE
Refills: 0 | Status: COMPLETED | OUTPATIENT
Start: 2022-01-01 | End: 2022-01-01

## 2022-01-01 RX ORDER — POTASSIUM CHLORIDE 20 MEQ
20 PACKET (EA) ORAL
Refills: 0 | Status: DISCONTINUED | OUTPATIENT
Start: 2022-01-01 | End: 2022-01-01

## 2022-01-01 RX ORDER — AZITHROMYCIN 500 MG/1
500 TABLET, FILM COATED ORAL ONCE
Refills: 0 | Status: COMPLETED | OUTPATIENT
Start: 2022-01-01 | End: 2022-01-01

## 2022-01-01 RX ORDER — INSULIN LISPRO 100/ML
VIAL (ML) SUBCUTANEOUS AT BEDTIME
Refills: 0 | Status: DISCONTINUED | OUTPATIENT
Start: 2022-01-01 | End: 2022-01-01

## 2022-01-01 RX ORDER — AMPICILLIN SODIUM AND SULBACTAM SODIUM 250; 125 MG/ML; MG/ML
INJECTION, POWDER, FOR SUSPENSION INTRAMUSCULAR; INTRAVENOUS
Refills: 0 | Status: DISCONTINUED | OUTPATIENT
Start: 2022-01-01 | End: 2022-01-01

## 2022-01-01 RX ORDER — SODIUM ZIRCONIUM CYCLOSILICATE 10 G/10G
10 POWDER, FOR SUSPENSION ORAL EVERY 8 HOURS
Refills: 0 | Status: COMPLETED | OUTPATIENT
Start: 2022-01-01 | End: 2022-01-01

## 2022-01-01 RX ORDER — METFORMIN HYDROCHLORIDE 850 MG/1
1 TABLET ORAL
Qty: 0 | Refills: 0 | DISCHARGE

## 2022-01-01 RX ORDER — POTASSIUM CHLORIDE 20 MEQ
10 PACKET (EA) ORAL
Refills: 0 | Status: DISCONTINUED | OUTPATIENT
Start: 2022-01-01 | End: 2022-01-01

## 2022-01-01 RX ORDER — APIXABAN 2.5 MG/1
10 TABLET, FILM COATED ORAL
Refills: 0 | Status: DISCONTINUED | OUTPATIENT
Start: 2022-01-01 | End: 2022-01-01

## 2022-01-01 RX ORDER — LANOLIN ALCOHOL/MO/W.PET/CERES
3 CREAM (GRAM) TOPICAL AT BEDTIME
Refills: 0 | Status: DISCONTINUED | OUTPATIENT
Start: 2022-01-01 | End: 2022-01-01

## 2022-01-01 RX ORDER — LANCETS 33 GAUGE
EACH MISCELLANEOUS
Qty: 100 | Refills: 0 | Status: DISCONTINUED | COMMUNITY
Start: 2018-04-09 | End: 2022-01-01

## 2022-01-01 RX ORDER — ENOXAPARIN SODIUM 100 MG/ML
40 INJECTION SUBCUTANEOUS ONCE
Refills: 0 | Status: COMPLETED | OUTPATIENT
Start: 2022-01-01 | End: 2022-01-01

## 2022-01-01 RX ORDER — CHLORHEXIDINE GLUCONATE 213 G/1000ML
1 SOLUTION TOPICAL
Refills: 0 | Status: DISCONTINUED | OUTPATIENT
Start: 2022-01-01 | End: 2022-01-01

## 2022-01-01 RX ORDER — INSULIN HUMAN 100 [IU]/ML
10 INJECTION, SOLUTION SUBCUTANEOUS ONCE
Refills: 0 | Status: COMPLETED | OUTPATIENT
Start: 2022-01-01 | End: 2022-01-01

## 2022-01-01 RX ORDER — ATORVASTATIN CALCIUM 10 MG/1
10 TABLET, FILM COATED ORAL
Qty: 90 | Refills: 3 | Status: ACTIVE | COMMUNITY
Start: 2019-01-15

## 2022-01-01 RX ORDER — ENOXAPARIN SODIUM 100 MG/ML
40 INJECTION SUBCUTANEOUS EVERY 12 HOURS
Refills: 0 | Status: DISCONTINUED | OUTPATIENT
Start: 2022-01-01 | End: 2022-01-01

## 2022-01-01 RX ORDER — METOPROLOL SUCCINATE 50 MG/1
50 TABLET, EXTENDED RELEASE ORAL DAILY
Qty: 30 | Refills: 6 | Status: ACTIVE | COMMUNITY
Start: 2019-02-04

## 2022-01-01 RX ORDER — LANOLIN ALCOHOL/MO/W.PET/CERES
1 CREAM (GRAM) TOPICAL
Qty: 30 | Refills: 0
Start: 2022-01-01 | End: 2022-10-24

## 2022-01-01 RX ORDER — APIXABAN 2.5 MG/1
1 TABLET, FILM COATED ORAL
Qty: 0 | Refills: 0 | DISCHARGE

## 2022-01-01 RX ORDER — RILUZOLE 50 MG/1
50 TABLET ORAL
Refills: 0 | Status: DISCONTINUED | OUTPATIENT
Start: 2022-01-01 | End: 2022-01-01

## 2022-01-01 RX ORDER — RILUZOLE 50 MG/1
50 TABLET, FILM COATED ORAL
Qty: 60 | Refills: 3 | Status: ACTIVE | COMMUNITY

## 2022-01-01 RX ORDER — ATORVASTATIN CALCIUM 80 MG/1
1 TABLET, FILM COATED ORAL
Qty: 0 | Refills: 0 | DISCHARGE

## 2022-01-01 RX ORDER — LOSARTAN POTASSIUM 100 MG/1
1 TABLET, FILM COATED ORAL
Qty: 0 | Refills: 0 | DISCHARGE

## 2022-01-01 RX ORDER — POTASSIUM CHLORIDE 20 MEQ
20 PACKET (EA) ORAL ONCE
Refills: 0 | Status: COMPLETED | OUTPATIENT
Start: 2022-01-01 | End: 2022-01-01

## 2022-01-01 RX ORDER — EDARAVONE 105 MG/5ML
105 KIT ORAL
Qty: 1 | Refills: 5 | Status: ACTIVE | COMMUNITY
Start: 2022-01-01

## 2022-01-01 RX ORDER — BLOOD SUGAR DIAGNOSTIC
STRIP MISCELLANEOUS
Qty: 100 | Refills: 0 | Status: DISCONTINUED | COMMUNITY
Start: 2018-05-31 | End: 2022-01-01

## 2022-01-01 RX ADMIN — RILUZOLE 50 MILLIGRAM(S): 50 TABLET ORAL at 21:05

## 2022-01-01 RX ADMIN — Medication 0.25 MILLIGRAM(S): at 23:47

## 2022-01-01 RX ADMIN — Medication 15 MILLIGRAM(S): at 21:27

## 2022-01-01 RX ADMIN — LOSARTAN POTASSIUM 25 MILLIGRAM(S): 100 TABLET, FILM COATED ORAL at 10:53

## 2022-01-01 RX ADMIN — Medication 25 MILLIGRAM(S): at 18:44

## 2022-01-01 RX ADMIN — ENOXAPARIN SODIUM 40 MILLIGRAM(S): 100 INJECTION SUBCUTANEOUS at 12:33

## 2022-01-01 RX ADMIN — ATORVASTATIN CALCIUM 10 MILLIGRAM(S): 80 TABLET, FILM COATED ORAL at 11:05

## 2022-01-01 RX ADMIN — ENOXAPARIN SODIUM 40 MILLIGRAM(S): 100 INJECTION SUBCUTANEOUS at 23:32

## 2022-01-01 RX ADMIN — CHLORHEXIDINE GLUCONATE 1 APPLICATION(S): 213 SOLUTION TOPICAL at 07:13

## 2022-01-01 RX ADMIN — RILUZOLE 50 MILLIGRAM(S): 50 TABLET ORAL at 17:45

## 2022-01-01 RX ADMIN — Medication 0.25 MILLIGRAM(S): at 22:36

## 2022-01-01 RX ADMIN — Medication 0.5 MILLIGRAM(S): at 14:25

## 2022-01-01 RX ADMIN — Medication 15 MILLIGRAM(S): at 18:57

## 2022-01-01 RX ADMIN — LOSARTAN POTASSIUM 25 MILLIGRAM(S): 100 TABLET, FILM COATED ORAL at 05:19

## 2022-01-01 RX ADMIN — RILUZOLE 50 MILLIGRAM(S): 50 TABLET ORAL at 00:31

## 2022-01-01 RX ADMIN — AMPICILLIN SODIUM AND SULBACTAM SODIUM 100 GRAM(S): 250; 125 INJECTION, POWDER, FOR SUSPENSION INTRAMUSCULAR; INTRAVENOUS at 18:54

## 2022-01-01 RX ADMIN — Medication 40 MILLIEQUIVALENT(S): at 13:50

## 2022-01-01 RX ADMIN — Medication 2: at 18:18

## 2022-01-01 RX ADMIN — ATORVASTATIN CALCIUM 10 MILLIGRAM(S): 80 TABLET, FILM COATED ORAL at 11:26

## 2022-01-01 RX ADMIN — SCOPALAMINE 1 PATCH: 1 PATCH, EXTENDED RELEASE TRANSDERMAL at 19:00

## 2022-01-01 RX ADMIN — SCOPALAMINE 1 PATCH: 1 PATCH, EXTENDED RELEASE TRANSDERMAL at 06:40

## 2022-01-01 RX ADMIN — ENOXAPARIN SODIUM 40 MILLIGRAM(S): 100 INJECTION SUBCUTANEOUS at 21:17

## 2022-01-01 RX ADMIN — Medication 3 MILLIGRAM(S): at 20:55

## 2022-01-01 RX ADMIN — Medication 650 MILLIGRAM(S): at 05:30

## 2022-01-01 RX ADMIN — ENOXAPARIN SODIUM 40 MILLIGRAM(S): 100 INJECTION SUBCUTANEOUS at 22:13

## 2022-01-01 RX ADMIN — RILUZOLE 50 MILLIGRAM(S): 50 TABLET ORAL at 05:44

## 2022-01-01 RX ADMIN — SCOPALAMINE 1 PATCH: 1 PATCH, EXTENDED RELEASE TRANSDERMAL at 21:36

## 2022-01-01 RX ADMIN — AMPICILLIN SODIUM AND SULBACTAM SODIUM 100 GRAM(S): 250; 125 INJECTION, POWDER, FOR SUSPENSION INTRAMUSCULAR; INTRAVENOUS at 23:42

## 2022-01-01 RX ADMIN — Medication 0.5 MILLIGRAM(S): at 05:44

## 2022-01-01 RX ADMIN — AMPICILLIN SODIUM AND SULBACTAM SODIUM 100 GRAM(S): 250; 125 INJECTION, POWDER, FOR SUSPENSION INTRAMUSCULAR; INTRAVENOUS at 23:46

## 2022-01-01 RX ADMIN — CHLORHEXIDINE GLUCONATE 1 APPLICATION(S): 213 SOLUTION TOPICAL at 05:39

## 2022-01-01 RX ADMIN — Medication 650 MILLIGRAM(S): at 22:00

## 2022-01-01 RX ADMIN — Medication 650 MILLIGRAM(S): at 12:21

## 2022-01-01 RX ADMIN — SCOPALAMINE 1 PATCH: 1 PATCH, EXTENDED RELEASE TRANSDERMAL at 08:18

## 2022-01-01 RX ADMIN — AMPICILLIN SODIUM AND SULBACTAM SODIUM 100 GRAM(S): 250; 125 INJECTION, POWDER, FOR SUSPENSION INTRAMUSCULAR; INTRAVENOUS at 22:13

## 2022-01-01 RX ADMIN — AMPICILLIN SODIUM AND SULBACTAM SODIUM 100 GRAM(S): 250; 125 INJECTION, POWDER, FOR SUSPENSION INTRAMUSCULAR; INTRAVENOUS at 05:18

## 2022-01-01 RX ADMIN — RILUZOLE 50 MILLIGRAM(S): 50 TABLET ORAL at 06:30

## 2022-01-01 RX ADMIN — AMPICILLIN SODIUM AND SULBACTAM SODIUM 100 GRAM(S): 250; 125 INJECTION, POWDER, FOR SUSPENSION INTRAMUSCULAR; INTRAVENOUS at 13:48

## 2022-01-01 RX ADMIN — RILUZOLE 50 MILLIGRAM(S): 50 TABLET ORAL at 17:32

## 2022-01-01 RX ADMIN — RILUZOLE 50 MILLIGRAM(S): 50 TABLET ORAL at 17:48

## 2022-01-01 RX ADMIN — AMPICILLIN SODIUM AND SULBACTAM SODIUM 100 GRAM(S): 250; 125 INJECTION, POWDER, FOR SUSPENSION INTRAMUSCULAR; INTRAVENOUS at 17:45

## 2022-01-01 RX ADMIN — Medication 3 MILLIGRAM(S): at 01:09

## 2022-01-01 RX ADMIN — Medication 650 MILLIGRAM(S): at 23:00

## 2022-01-01 RX ADMIN — SCOPALAMINE 1 PATCH: 1 PATCH, EXTENDED RELEASE TRANSDERMAL at 08:05

## 2022-01-01 RX ADMIN — AMPICILLIN SODIUM AND SULBACTAM SODIUM 100 GRAM(S): 250; 125 INJECTION, POWDER, FOR SUSPENSION INTRAMUSCULAR; INTRAVENOUS at 10:52

## 2022-01-01 RX ADMIN — SODIUM CHLORIDE 60 MILLILITER(S): 9 INJECTION, SOLUTION INTRAVENOUS at 09:49

## 2022-01-01 RX ADMIN — RILUZOLE 50 MILLIGRAM(S): 50 TABLET ORAL at 20:56

## 2022-01-01 RX ADMIN — SODIUM ZIRCONIUM CYCLOSILICATE 10 GRAM(S): 10 POWDER, FOR SUSPENSION ORAL at 18:38

## 2022-01-01 RX ADMIN — RILUZOLE 50 MILLIGRAM(S): 50 TABLET ORAL at 06:58

## 2022-01-01 RX ADMIN — LOSARTAN POTASSIUM 25 MILLIGRAM(S): 100 TABLET, FILM COATED ORAL at 06:31

## 2022-01-01 RX ADMIN — ENOXAPARIN SODIUM 40 MILLIGRAM(S): 100 INJECTION SUBCUTANEOUS at 21:18

## 2022-01-01 RX ADMIN — AMPICILLIN SODIUM AND SULBACTAM SODIUM 100 GRAM(S): 250; 125 INJECTION, POWDER, FOR SUSPENSION INTRAMUSCULAR; INTRAVENOUS at 17:31

## 2022-01-01 RX ADMIN — Medication 150 GRAM(S): at 12:07

## 2022-01-01 RX ADMIN — Medication 3 MILLIGRAM(S): at 21:01

## 2022-01-01 RX ADMIN — AMPICILLIN SODIUM AND SULBACTAM SODIUM 100 GRAM(S): 250; 125 INJECTION, POWDER, FOR SUSPENSION INTRAMUSCULAR; INTRAVENOUS at 17:19

## 2022-01-01 RX ADMIN — Medication 25 MILLIGRAM(S): at 13:30

## 2022-01-01 RX ADMIN — MORPHINE SULFATE 2 MILLIGRAM(S): 50 CAPSULE, EXTENDED RELEASE ORAL at 08:36

## 2022-01-01 RX ADMIN — Medication 650 MILLIGRAM(S): at 06:25

## 2022-01-01 RX ADMIN — CHLORHEXIDINE GLUCONATE 1 APPLICATION(S): 213 SOLUTION TOPICAL at 06:15

## 2022-01-01 RX ADMIN — APIXABAN 10 MILLIGRAM(S): 2.5 TABLET, FILM COATED ORAL at 17:48

## 2022-01-01 RX ADMIN — POTASSIUM PHOSPHATE, MONOBASIC POTASSIUM PHOSPHATE, DIBASIC 62.5 MILLIMOLE(S): 236; 224 INJECTION, SOLUTION INTRAVENOUS at 12:10

## 2022-01-01 RX ADMIN — ATORVASTATIN CALCIUM 10 MILLIGRAM(S): 80 TABLET, FILM COATED ORAL at 12:45

## 2022-01-01 RX ADMIN — RILUZOLE 50 MILLIGRAM(S): 50 TABLET ORAL at 21:53

## 2022-01-01 RX ADMIN — Medication 650 MILLIGRAM(S): at 12:08

## 2022-01-01 RX ADMIN — SODIUM CHLORIDE 75 MILLILITER(S): 9 INJECTION, SOLUTION INTRAVENOUS at 19:01

## 2022-01-01 RX ADMIN — Medication 0.5 MILLIGRAM(S): at 10:08

## 2022-01-01 RX ADMIN — AMPICILLIN SODIUM AND SULBACTAM SODIUM 100 GRAM(S): 250; 125 INJECTION, POWDER, FOR SUSPENSION INTRAMUSCULAR; INTRAVENOUS at 12:24

## 2022-01-01 RX ADMIN — AMPICILLIN SODIUM AND SULBACTAM SODIUM 100 GRAM(S): 250; 125 INJECTION, POWDER, FOR SUSPENSION INTRAMUSCULAR; INTRAVENOUS at 00:54

## 2022-01-01 RX ADMIN — AMPICILLIN SODIUM AND SULBACTAM SODIUM 100 GRAM(S): 250; 125 INJECTION, POWDER, FOR SUSPENSION INTRAMUSCULAR; INTRAVENOUS at 17:41

## 2022-01-01 RX ADMIN — PANTOPRAZOLE SODIUM 40 MILLIGRAM(S): 20 TABLET, DELAYED RELEASE ORAL at 23:59

## 2022-01-01 RX ADMIN — Medication 0.25 MILLIGRAM(S): at 07:29

## 2022-01-01 RX ADMIN — Medication 650 MILLIGRAM(S): at 07:00

## 2022-01-01 RX ADMIN — Medication 1 TABLET(S): at 13:49

## 2022-01-01 RX ADMIN — Medication 0.25 MILLIGRAM(S): at 21:02

## 2022-01-01 RX ADMIN — SCOPALAMINE 1 PATCH: 1 PATCH, EXTENDED RELEASE TRANSDERMAL at 19:17

## 2022-01-01 RX ADMIN — SODIUM CHLORIDE 75 MILLILITER(S): 9 INJECTION, SOLUTION INTRAVENOUS at 17:42

## 2022-01-01 RX ADMIN — Medication 0.25 MILLIGRAM(S): at 13:49

## 2022-01-01 RX ADMIN — ENOXAPARIN SODIUM 40 MILLIGRAM(S): 100 INJECTION SUBCUTANEOUS at 10:03

## 2022-01-01 RX ADMIN — ATORVASTATIN CALCIUM 10 MILLIGRAM(S): 80 TABLET, FILM COATED ORAL at 13:18

## 2022-01-01 RX ADMIN — AMPICILLIN SODIUM AND SULBACTAM SODIUM 100 GRAM(S): 250; 125 INJECTION, POWDER, FOR SUSPENSION INTRAMUSCULAR; INTRAVENOUS at 23:59

## 2022-01-01 RX ADMIN — APIXABAN 10 MILLIGRAM(S): 2.5 TABLET, FILM COATED ORAL at 06:15

## 2022-01-01 RX ADMIN — APIXABAN 10 MILLIGRAM(S): 2.5 TABLET, FILM COATED ORAL at 05:28

## 2022-01-01 RX ADMIN — Medication 25 MILLIGRAM(S): at 22:15

## 2022-01-01 RX ADMIN — Medication 2: at 17:54

## 2022-01-01 RX ADMIN — Medication 1 TABLET(S): at 11:22

## 2022-01-01 RX ADMIN — CHLORHEXIDINE GLUCONATE 1 APPLICATION(S): 213 SOLUTION TOPICAL at 05:25

## 2022-01-01 RX ADMIN — Medication 1 TABLET(S): at 12:32

## 2022-01-01 RX ADMIN — RILUZOLE 50 MILLIGRAM(S): 50 TABLET ORAL at 14:16

## 2022-01-01 RX ADMIN — Medication 0.5 MILLIGRAM(S): at 19:48

## 2022-01-01 RX ADMIN — ONDANSETRON 4 MILLIGRAM(S): 8 TABLET, FILM COATED ORAL at 21:01

## 2022-01-01 RX ADMIN — SCOPALAMINE 1 PATCH: 1 PATCH, EXTENDED RELEASE TRANSDERMAL at 21:17

## 2022-01-01 RX ADMIN — Medication 30 MILLILITER(S): at 02:13

## 2022-01-01 RX ADMIN — SCOPALAMINE 1 PATCH: 1 PATCH, EXTENDED RELEASE TRANSDERMAL at 08:00

## 2022-01-01 RX ADMIN — MORPHINE SULFATE 2 MILLIGRAM(S): 50 CAPSULE, EXTENDED RELEASE ORAL at 09:30

## 2022-01-01 RX ADMIN — Medication 0.25 MILLIGRAM(S): at 08:55

## 2022-01-01 RX ADMIN — SODIUM CHLORIDE 60 MILLILITER(S): 9 INJECTION, SOLUTION INTRAVENOUS at 23:19

## 2022-01-01 RX ADMIN — ENOXAPARIN SODIUM 40 MILLIGRAM(S): 100 INJECTION SUBCUTANEOUS at 21:02

## 2022-01-01 RX ADMIN — RILUZOLE 50 MILLIGRAM(S): 50 TABLET ORAL at 10:53

## 2022-01-01 RX ADMIN — CHLORHEXIDINE GLUCONATE 1 APPLICATION(S): 213 SOLUTION TOPICAL at 05:28

## 2022-01-01 RX ADMIN — ENOXAPARIN SODIUM 40 MILLIGRAM(S): 100 INJECTION SUBCUTANEOUS at 10:05

## 2022-01-01 RX ADMIN — Medication 0.5 MILLIGRAM(S): at 20:51

## 2022-01-01 RX ADMIN — SCOPALAMINE 1 PATCH: 1 PATCH, EXTENDED RELEASE TRANSDERMAL at 19:45

## 2022-01-01 RX ADMIN — LOSARTAN POTASSIUM 25 MILLIGRAM(S): 100 TABLET, FILM COATED ORAL at 14:17

## 2022-01-01 RX ADMIN — AMPICILLIN SODIUM AND SULBACTAM SODIUM 100 GRAM(S): 250; 125 INJECTION, POWDER, FOR SUSPENSION INTRAMUSCULAR; INTRAVENOUS at 05:44

## 2022-01-01 RX ADMIN — Medication 1 TABLET(S): at 12:47

## 2022-01-01 RX ADMIN — Medication 0.5 MILLIGRAM(S): at 12:31

## 2022-01-01 RX ADMIN — Medication 0.25 MILLIGRAM(S): at 11:47

## 2022-01-01 RX ADMIN — ENOXAPARIN SODIUM 40 MILLIGRAM(S): 100 INJECTION SUBCUTANEOUS at 11:25

## 2022-01-01 RX ADMIN — AMPICILLIN SODIUM AND SULBACTAM SODIUM 100 GRAM(S): 250; 125 INJECTION, POWDER, FOR SUSPENSION INTRAMUSCULAR; INTRAVENOUS at 23:31

## 2022-01-01 RX ADMIN — LOSARTAN POTASSIUM 25 MILLIGRAM(S): 100 TABLET, FILM COATED ORAL at 05:44

## 2022-01-01 RX ADMIN — LOSARTAN POTASSIUM 25 MILLIGRAM(S): 100 TABLET, FILM COATED ORAL at 05:31

## 2022-01-01 RX ADMIN — ENOXAPARIN SODIUM 40 MILLIGRAM(S): 100 INJECTION SUBCUTANEOUS at 23:46

## 2022-01-01 RX ADMIN — SODIUM CHLORIDE 60 MILLILITER(S): 9 INJECTION, SOLUTION INTRAVENOUS at 20:20

## 2022-01-01 RX ADMIN — Medication 650 MILLIGRAM(S): at 10:03

## 2022-01-01 RX ADMIN — ENOXAPARIN SODIUM 40 MILLIGRAM(S): 100 INJECTION SUBCUTANEOUS at 10:01

## 2022-01-01 RX ADMIN — SCOPALAMINE 1 PATCH: 1 PATCH, EXTENDED RELEASE TRANSDERMAL at 22:12

## 2022-01-01 RX ADMIN — AZITHROMYCIN 255 MILLIGRAM(S): 500 TABLET, FILM COATED ORAL at 04:06

## 2022-01-01 RX ADMIN — AMPICILLIN SODIUM AND SULBACTAM SODIUM 100 GRAM(S): 250; 125 INJECTION, POWDER, FOR SUSPENSION INTRAMUSCULAR; INTRAVENOUS at 11:25

## 2022-01-01 RX ADMIN — RILUZOLE 50 MILLIGRAM(S): 50 TABLET ORAL at 18:37

## 2022-01-01 RX ADMIN — ATORVASTATIN CALCIUM 10 MILLIGRAM(S): 80 TABLET, FILM COATED ORAL at 11:31

## 2022-01-01 RX ADMIN — AMPICILLIN SODIUM AND SULBACTAM SODIUM 100 GRAM(S): 250; 125 INJECTION, POWDER, FOR SUSPENSION INTRAMUSCULAR; INTRAVENOUS at 06:26

## 2022-01-01 RX ADMIN — RILUZOLE 50 MILLIGRAM(S): 50 TABLET ORAL at 18:48

## 2022-01-01 RX ADMIN — AMPICILLIN SODIUM AND SULBACTAM SODIUM 100 GRAM(S): 250; 125 INJECTION, POWDER, FOR SUSPENSION INTRAMUSCULAR; INTRAVENOUS at 06:31

## 2022-01-01 RX ADMIN — Medication 650 MILLIGRAM(S): at 13:30

## 2022-01-01 RX ADMIN — Medication 650 MILLIGRAM(S): at 05:46

## 2022-01-01 RX ADMIN — ENOXAPARIN SODIUM 40 MILLIGRAM(S): 100 INJECTION SUBCUTANEOUS at 06:27

## 2022-01-01 RX ADMIN — AMPICILLIN SODIUM AND SULBACTAM SODIUM 100 GRAM(S): 250; 125 INJECTION, POWDER, FOR SUSPENSION INTRAMUSCULAR; INTRAVENOUS at 12:31

## 2022-01-01 RX ADMIN — Medication 650 MILLIGRAM(S): at 12:31

## 2022-01-01 RX ADMIN — RILUZOLE 50 MILLIGRAM(S): 50 TABLET ORAL at 06:33

## 2022-01-01 RX ADMIN — Medication 3 MILLIGRAM(S): at 22:08

## 2022-01-01 RX ADMIN — RILUZOLE 50 MILLIGRAM(S): 50 TABLET ORAL at 09:19

## 2022-01-01 RX ADMIN — SCOPALAMINE 1 PATCH: 1 PATCH, EXTENDED RELEASE TRANSDERMAL at 17:53

## 2022-01-01 RX ADMIN — AMPICILLIN SODIUM AND SULBACTAM SODIUM 100 GRAM(S): 250; 125 INJECTION, POWDER, FOR SUSPENSION INTRAMUSCULAR; INTRAVENOUS at 12:37

## 2022-01-01 RX ADMIN — AMPICILLIN SODIUM AND SULBACTAM SODIUM 100 GRAM(S): 250; 125 INJECTION, POWDER, FOR SUSPENSION INTRAMUSCULAR; INTRAVENOUS at 00:39

## 2022-01-01 RX ADMIN — CHLORHEXIDINE GLUCONATE 1 APPLICATION(S): 213 SOLUTION TOPICAL at 06:33

## 2022-01-01 RX ADMIN — CEFTRIAXONE 100 MILLIGRAM(S): 500 INJECTION, POWDER, FOR SOLUTION INTRAMUSCULAR; INTRAVENOUS at 03:27

## 2022-01-01 RX ADMIN — Medication 1 TABLET(S): at 13:19

## 2022-01-01 RX ADMIN — AMPICILLIN SODIUM AND SULBACTAM SODIUM 100 GRAM(S): 250; 125 INJECTION, POWDER, FOR SUSPENSION INTRAMUSCULAR; INTRAVENOUS at 19:10

## 2022-01-01 RX ADMIN — ATORVASTATIN CALCIUM 10 MILLIGRAM(S): 80 TABLET, FILM COATED ORAL at 12:32

## 2022-01-01 RX ADMIN — Medication 40 MILLIEQUIVALENT(S): at 20:58

## 2022-01-01 RX ADMIN — CHLORHEXIDINE GLUCONATE 1 APPLICATION(S): 213 SOLUTION TOPICAL at 05:45

## 2022-01-01 RX ADMIN — AMPICILLIN SODIUM AND SULBACTAM SODIUM 100 GRAM(S): 250; 125 INJECTION, POWDER, FOR SUSPENSION INTRAMUSCULAR; INTRAVENOUS at 12:00

## 2022-01-01 RX ADMIN — AMPICILLIN SODIUM AND SULBACTAM SODIUM 100 GRAM(S): 250; 125 INJECTION, POWDER, FOR SUSPENSION INTRAMUSCULAR; INTRAVENOUS at 05:43

## 2022-01-01 RX ADMIN — CHLORHEXIDINE GLUCONATE 1 APPLICATION(S): 213 SOLUTION TOPICAL at 05:31

## 2022-01-01 RX ADMIN — RILUZOLE 50 MILLIGRAM(S): 50 TABLET ORAL at 06:38

## 2022-01-01 RX ADMIN — Medication 650 MILLIGRAM(S): at 11:47

## 2022-01-01 RX ADMIN — ATORVASTATIN CALCIUM 10 MILLIGRAM(S): 80 TABLET, FILM COATED ORAL at 13:50

## 2022-01-01 RX ADMIN — ENOXAPARIN SODIUM 40 MILLIGRAM(S): 100 INJECTION SUBCUTANEOUS at 21:09

## 2022-01-01 RX ADMIN — ENOXAPARIN SODIUM 40 MILLIGRAM(S): 100 INJECTION SUBCUTANEOUS at 13:44

## 2022-01-01 RX ADMIN — ENOXAPARIN SODIUM 40 MILLIGRAM(S): 100 INJECTION SUBCUTANEOUS at 10:52

## 2022-01-01 RX ADMIN — Medication 0.5 MILLIGRAM(S): at 18:18

## 2022-01-01 RX ADMIN — CHLORHEXIDINE GLUCONATE 1 APPLICATION(S): 213 SOLUTION TOPICAL at 06:25

## 2022-01-01 RX ADMIN — ENOXAPARIN SODIUM 40 MILLIGRAM(S): 100 INJECTION SUBCUTANEOUS at 22:08

## 2022-01-01 RX ADMIN — Medication 40 MILLIEQUIVALENT(S): at 17:54

## 2022-01-01 RX ADMIN — Medication 650 MILLIGRAM(S): at 20:09

## 2022-01-01 RX ADMIN — Medication 0.5 MILLIGRAM(S): at 17:46

## 2022-01-01 RX ADMIN — ENOXAPARIN SODIUM 40 MILLIGRAM(S): 100 INJECTION SUBCUTANEOUS at 09:51

## 2022-01-01 RX ADMIN — Medication 650 MILLIGRAM(S): at 06:15

## 2022-01-01 RX ADMIN — ENOXAPARIN SODIUM 40 MILLIGRAM(S): 100 INJECTION SUBCUTANEOUS at 21:35

## 2022-01-01 RX ADMIN — Medication 0.5 MILLIGRAM(S): at 05:43

## 2022-01-01 RX ADMIN — AMPICILLIN SODIUM AND SULBACTAM SODIUM 100 GRAM(S): 250; 125 INJECTION, POWDER, FOR SUSPENSION INTRAMUSCULAR; INTRAVENOUS at 05:31

## 2022-01-01 RX ADMIN — AMPICILLIN SODIUM AND SULBACTAM SODIUM 100 GRAM(S): 250; 125 INJECTION, POWDER, FOR SUSPENSION INTRAMUSCULAR; INTRAVENOUS at 05:38

## 2022-01-01 RX ADMIN — RILUZOLE 50 MILLIGRAM(S): 50 TABLET ORAL at 18:19

## 2022-01-01 RX ADMIN — AMPICILLIN SODIUM AND SULBACTAM SODIUM 100 GRAM(S): 250; 125 INJECTION, POWDER, FOR SUSPENSION INTRAMUSCULAR; INTRAVENOUS at 13:45

## 2022-01-01 RX ADMIN — ATORVASTATIN CALCIUM 10 MILLIGRAM(S): 80 TABLET, FILM COATED ORAL at 12:04

## 2022-01-01 RX ADMIN — LOSARTAN POTASSIUM 25 MILLIGRAM(S): 100 TABLET, FILM COATED ORAL at 06:26

## 2022-01-01 RX ADMIN — RILUZOLE 50 MILLIGRAM(S): 50 TABLET ORAL at 05:29

## 2022-01-01 RX ADMIN — CHLORHEXIDINE GLUCONATE 1 APPLICATION(S): 213 SOLUTION TOPICAL at 05:53

## 2022-01-01 RX ADMIN — SCOPALAMINE 1 PATCH: 1 PATCH, EXTENDED RELEASE TRANSDERMAL at 23:00

## 2022-01-01 RX ADMIN — RILUZOLE 50 MILLIGRAM(S): 50 TABLET ORAL at 17:55

## 2022-01-01 RX ADMIN — SODIUM ZIRCONIUM CYCLOSILICATE 10 GRAM(S): 10 POWDER, FOR SUSPENSION ORAL at 12:32

## 2022-01-01 RX ADMIN — CHLORHEXIDINE GLUCONATE 1 APPLICATION(S): 213 SOLUTION TOPICAL at 05:20

## 2022-01-01 RX ADMIN — Medication 0.5 MILLIGRAM(S): at 21:01

## 2022-01-01 RX ADMIN — APIXABAN 10 MILLIGRAM(S): 2.5 TABLET, FILM COATED ORAL at 17:54

## 2022-01-01 RX ADMIN — SCOPALAMINE 1 PATCH: 1 PATCH, EXTENDED RELEASE TRANSDERMAL at 23:18

## 2022-01-01 RX ADMIN — Medication 50 MILLIEQUIVALENT(S): at 05:20

## 2022-01-01 RX ADMIN — Medication 650 MILLIGRAM(S): at 01:09

## 2022-01-01 RX ADMIN — ATORVASTATIN CALCIUM 10 MILLIGRAM(S): 80 TABLET, FILM COATED ORAL at 12:00

## 2022-01-01 RX ADMIN — Medication 1 TABLET(S): at 11:31

## 2022-01-01 RX ADMIN — AMPICILLIN SODIUM AND SULBACTAM SODIUM 100 GRAM(S): 250; 125 INJECTION, POWDER, FOR SUSPENSION INTRAMUSCULAR; INTRAVENOUS at 23:39

## 2022-01-01 RX ADMIN — SCOPALAMINE 1 PATCH: 1 PATCH, EXTENDED RELEASE TRANSDERMAL at 21:12

## 2022-01-01 RX ADMIN — AMPICILLIN SODIUM AND SULBACTAM SODIUM 100 GRAM(S): 250; 125 INJECTION, POWDER, FOR SUSPENSION INTRAMUSCULAR; INTRAVENOUS at 12:12

## 2022-01-01 RX ADMIN — ATORVASTATIN CALCIUM 10 MILLIGRAM(S): 80 TABLET, FILM COATED ORAL at 11:21

## 2022-01-01 RX ADMIN — LOSARTAN POTASSIUM 25 MILLIGRAM(S): 100 TABLET, FILM COATED ORAL at 05:24

## 2022-01-01 RX ADMIN — Medication 3 MILLIGRAM(S): at 23:48

## 2022-01-01 RX ADMIN — SCOPALAMINE 1 PATCH: 1 PATCH, EXTENDED RELEASE TRANSDERMAL at 20:15

## 2022-01-01 RX ADMIN — SCOPALAMINE 1 PATCH: 1 PATCH, EXTENDED RELEASE TRANSDERMAL at 23:28

## 2022-01-01 RX ADMIN — APIXABAN 10 MILLIGRAM(S): 2.5 TABLET, FILM COATED ORAL at 18:18

## 2022-01-01 RX ADMIN — ATORVASTATIN CALCIUM 10 MILLIGRAM(S): 80 TABLET, FILM COATED ORAL at 14:17

## 2022-01-01 RX ADMIN — Medication 650 MILLIGRAM(S): at 22:07

## 2022-01-01 RX ADMIN — Medication 0.5 MILLIGRAM(S): at 13:50

## 2022-01-01 RX ADMIN — Medication 40 MILLIEQUIVALENT(S): at 18:43

## 2022-01-01 RX ADMIN — Medication 4: at 21:35

## 2022-01-01 RX ADMIN — SCOPALAMINE 1 PATCH: 1 PATCH, EXTENDED RELEASE TRANSDERMAL at 20:39

## 2022-01-01 RX ADMIN — AMPICILLIN SODIUM AND SULBACTAM SODIUM 100 GRAM(S): 250; 125 INJECTION, POWDER, FOR SUSPENSION INTRAMUSCULAR; INTRAVENOUS at 05:25

## 2022-01-01 RX ADMIN — Medication 650 MILLIGRAM(S): at 01:40

## 2022-01-01 RX ADMIN — SCOPALAMINE 1 PATCH: 1 PATCH, EXTENDED RELEASE TRANSDERMAL at 20:20

## 2022-01-01 RX ADMIN — Medication 650 MILLIGRAM(S): at 21:26

## 2022-01-01 RX ADMIN — AMPICILLIN SODIUM AND SULBACTAM SODIUM 100 GRAM(S): 250; 125 INJECTION, POWDER, FOR SUSPENSION INTRAMUSCULAR; INTRAVENOUS at 17:57

## 2022-01-01 RX ADMIN — SODIUM CHLORIDE 60 MILLILITER(S): 9 INJECTION, SOLUTION INTRAVENOUS at 03:05

## 2022-04-01 PROBLEM — R20.9 COLD FOOT: Status: ACTIVE | Noted: 2022-01-01

## 2022-04-01 PROBLEM — H26.9 CATARACT, BILATERAL: Status: ACTIVE | Noted: 2022-01-01

## 2022-04-01 PROBLEM — L97.809: Status: ACTIVE | Noted: 2022-01-01

## 2022-04-01 PROBLEM — Z00.00 ENCOUNTER FOR PREVENTIVE HEALTH EXAMINATION: Status: ACTIVE | Noted: 2019-04-02

## 2022-04-01 NOTE — END OF VISIT
[] : Resident [FreeTextEntry3] : Patient examined:  moderately dysarthric but speech intelligibility remains good though daughter reports it declines significantly later in the day.  Has BIPAP and using it.  She is strongest in arms and weak in intrinsic muscles of hands.  LE"s moderately weak proximally and severe distal weakness. Will send for PFT's, GI for PEG, Nutrition, PT, home evaluation will benefit from home health aid.  Mobility and wheelchair evaluation never occurred so will resend referral.  WIll put patient in contact with [facility] REYMUNDO Tan.

## 2022-04-01 NOTE — REVIEW OF SYSTEMS
[As Noted in HPI] : as noted in HPI [Negative] : Heme/Lymph [Difficulty Walking] : difficulty walking [Ataxia] : ataxia [Seizures] : no convulsions [Dizziness] : no dizziness [Fainting] : no fainting [Lightheadedness] : no lightheadedness [Vertigo] : no vertigo [Cluster Headache] : no cluster headache [Migraine Headache] : no migraine headache [Tension Headache] : no tension-type headache [Frequent Falls] : not falling [Suicidal] : not suicidal [Sleep Disturbances] : no sleep disturbances [Anxiety] : no anxiety [Depression] : no depression

## 2022-04-01 NOTE — HISTORY OF PRESENT ILLNESS
[FreeTextEntry1] : Ms. 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 Witt after EMG/NCS was performed, but couldn't continue in clinic there bc too far. Initial presentation included progressive RUE weakness and ataxia, which began in 1/2020 but progressed. Last visit with Dr. Leblanc, she was found to have b/l deltoid, biceps, dorsi/plantar flexion and knee flexion weakness. She was also found to have fasciculations of b/l UE. However, speech was normal.  \par \par This visit the most notable difference is speech difficulty with dysarthria and tongue atrophy.\par \par 1) She has b/l AFO braces, but was unable to get PT/OT after 10/2021 bc couldn't get insurance coverage/reach [facility] PT clinic possibly related to COVID. \par 2) She was unable to get wheelchair bc she was missing documentation, like Rehab evaluation\par 3) She saw Dr Patel who recommended ST 2x per week for at least 6 weeks, which she is waiting for \par 4) She has more drooling, most noticeable when eating but able to move food around her mouth okay. \par \par -More drooling with eatting, moving food around mouth okay, some coughing when drinking \par -Had SS eval at [facility] in 7/2021, Dr. MOREIRA evaluated Tuesday of this week and rec ST  \par -Had rehab tool for holding utensils\par -Hands lock up, RUE weak lifting, \par -Buttoning clothes with clothes pins to being side together \par -Appetite is okay, but small, may need PEG\par -\par \par PLAN:\par -PT/OT----Check number \par -Rehab referral\par -Needs ST and voice banking \par -Barium swallow eval and Voice banking \par -Pulmonary function test, supplies \par -B/l arterial duplex and podiatry referral \par \par Recommendations:\par - continue riluzole 50 mg BID -- tolerating okay \par - check [id number].  Pt reports receiving a kit through the mail but hasn't submitted specimen.\par - continue BiPAP/cough assist\par - increase caloric intake as per nutritionist\par - recommend contact S/W to discuss possible ramp installation --- unable to, too many steps \par - PT/OT eval\par - wheelchair clinic eval\par - SLP eval for MBS and voice banking\par - continue AFO bracing\par - discussed edaravone infusions - unlikely to be candidate since significant fall risk already\par - discussed PEG.  Will get pulmonary evaluation to assess FVC and refer to GI for PEG.\par - will refer to [facility]. Given phone number to call.\par - f/u ALS clinic in 2-3 months. \par \par \par \par \par \par \par \par \par \par \par \par \par \par \par \par \par \par

## 2022-04-01 NOTE — REASON FOR VISIT
[Follow-Up: _____] : a [unfilled] follow-up visit [Family Member] : family member [FreeTextEntry1] : [facility] for ALS

## 2022-04-01 NOTE — PHYSICAL EXAM
[FreeTextEntry1] : Physical examination:  \par General:   The patient is pleasant, cooperative, well dressed and in no acute distress.  Appearance is consistent with chronologic age.  No abnormal facies.\par Neurologic examination:  The patient is oriented to person, place, time and date.   Remote and recent memory is normal.   Fund of knowledge is intact and normal.  Language with normal repetition, comprehension and naming.  Nondysarthric.   \par Cranial nerves examination: intact VA, VFF.  EOMI w/o nystagmus, skew or reported double vision.  PERRL.  No ptosis/weakness of eyelid closure.  Facial sensation is normal with normal bite.  No facial asymmetry.  Hearing grossly intact b/l.  Palate elevates midline.  Neck flexion/extension, SCM/Trap strength normal.  Tongue w/ significant atrophy with fasciculations.    \par Motor examination:   decreased bulk with atrophy.  (+) fasciculations b/l UE.\par Formal Muscle Strength Testing: (MRC grade R/L) 4+/4+YOHAN, 4/4+ BB, 5/5 TR, WF, WE; 3/3 FDI; 4/4 ILP; 5/5 QDS, HS; 4/4 DF; 5/4+ PF; 3/3 TP/PL.  \par Reflexes:   3+ b/l pectoralis, biceps, triceps, brachioradialis, patella and Achilles.  Plantar response downgoing b/l.  Jaw jerk, Rosario, clonus absent.  \par Sensory examination:  grossly intact\par Cerebellum:   FTN/HKS intact with normal ADAMARIS in all limbs.   Gait steppage requires walker.  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 \par She had cataracts b/l and follows with ophthalmologist \par Facial LT symmetric\par Tongue atrophy, slight fasciculation, movement okay; uvula midline \par B/L LE to LT symmetric\par R deltoid is 4/5 R, Triceps/Biceps 4+/5 R,  4/5 R\par Pincer grasp 4/5 R, all LUE normal \par Temp sensation decreased symmetrically below knee. Vibration decr below ankle b/l \par DTR RLE 2+, b/l ankles nml \par Hip flexion and ext b/l 4/5\par Knee ext b/l 4/5 \par Knee flection b/l 4/5 \par Dorsiflexion L foot 0/5, R 1/5\par L foot inversion 1/5 unable to harriet \par R foot eversion better than L\par B/l foot drop L is worse than R \par B/l temporal wasting, b/l hand muscle atrophy, FPL atrophy b/l. \par Non infected wound on the RLE on tibial side noticed, without induration or warmth or discharges. \par

## 2022-04-01 NOTE — DISCUSSION/SUMMARY
[FreeTextEntry1] : 72 yo RHF w/ h/o HTN, DM, DLD currently p/w progressive ataxia with asymmetric non-length dependent weakness > numbness w/ hyperreflexia diagnosed with MND/ALS. She received the genetic testing kit but has not done it yet. She has not been able to follow up with PT due covid since Oct 2021. [place] records are uploaded in our EMR, Ptn transferred care to  since difficult to travel to [place]. At this visit the ALS-FRS-R score is 31. \par \par Plan: \par PT/OT\par Referral to nutritionist\par Referral to PMNR/rehab and wheelchair request\par Referral to SW\par Referral to SLP and word banking, assisted voice aid. \par Referral for home health aid and visiting nurse. \par Labs including CMP and CBC\par referral to cardiology for check up and LE arterial duple and JOEL due to cold feet.\par Referral to IR for possible PEG tube placement\par Referral to ophthalmologist for b/l cataract and DM annual check up\par Referral to podiatry\par Referral to respiratory therapy and PFT\par Referral to wound care.

## 2022-06-03 NOTE — PHYSICAL EXAM
[FreeTextEntry1] : Physical examination:  \par \par Facial LT symmetric\par Tongue atrophy, slight fasciculation, movement okay; uvula midline \par B/L LE to LT symmetric\par R deltoid is 4/5 R, Triceps/Biceps 4+/5 R,  4/5 R\par Pincer grasp 4/5 R, all LUE normal \par Temp sensation decreased symmetrically below knee. Vibration decr below ankle b/l \par DTR RLE 2+, b/l ankles nml \par Hip flexion and ext b/l 4/5\par Knee ext b/l 4/5 \par Knee flexion b/l 4/5 \par Dorsiflexion L foot 0/5, R 1/5\par L foot inversion 1/5 unable to harriet \par R foot eversion better than L\par B/l foot drop L is worse than R \par B/l temporal wasting, b/l hand muscle atrophy, FPL atrophy b/l. \par Non infected wound on the RLE on tibial side noticed, without induration or warmth or discharges. \par Gait: Narrow, steppage, needs assistance.

## 2022-06-03 NOTE — HISTORY OF PRESENT ILLNESS
Informed patient of her upcoming surgery and all related tests/appointment and confirmed the dates & times all worked.    
[FreeTextEntry1] : Ms. Mandujano is a 70 yo RH woman with PMHx of DM, HLD, and HTN [place] [facility] [place] in 2021 (but sx started in 2019) that has been progressing. Initial presentation included progressive RUE weakness and ataxia, which began in 1/2020.  Last visit 2 mo ago most noticeable changes included dysarthria, tongue atrophy, more sialorrhea. She was strongest in the arms and had severe distal weakness in the LEs.  \par \par Presents with son Jorge A today. \par \par -Speech: Intelligible with repeat. Is seeing speech therapy, WANTS to do Voice banking \par -Swallowing/Nutrition/Weight: Blood sugar is under control. Didn't see Nutritionist. Discussed PEG down the line, can be referred to GI. \par -Breathing: Hasn't seen Pulm for PFT, bc hasn't gotten appt. Was using BiPAP, but machine is not functioning right now/beeping. Still using cough assist. Not coughing more.  \par -Sialorrhea: Not interfering with life at this point.   \par -Pseudobulbar affect/Mood: None. Denies depression or sadness\par -Pain: No pain. \par -Mobility: No longer getting PT/OT bc insurance only approved for short term. Has AFO braces b/l. Almost had mechanical fell last Wednesday, but caught herself. \par -Sleep: Sleeping well  \par -Palliative Care: \par -Equipment Needs: Had wheelchair eval, pending. May need 2 wheelchairs. Son saying ramp is not possible w/house structure. House may also not accommodate stair lift. \par -Medication Tolerance: Tolerating Riluzole well. Likely not Edaravone infusions bc of significant fall risk already\par -Primary concerns: Addressed \par -Coping/Social Support: Spoke to REYMUNDO Lorenz, has VNS. Was all short term, and completed. Hasn't reached out to ALS association. \par \par ALS-FRS 31/48 to 29/48\par \par -Still haven't sent out genetic Testing for [id number] but has it at home\par -LEs feel okay, swelling down. Saw wound care. \par -Saw ophtho of vision issues.

## 2022-06-03 NOTE — END OF VISIT
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  In meantime, may benefit from edaravone suspension recommend start BI.  Will need f/u with SLP for dysphagia screening and continue voice banking/ACD evaluation.  Recommendations as above.   [Time Spent: ___ minutes] : I have spent [unfilled] minutes of time on the encounter.

## 2022-06-03 NOTE — ASSESSMENT
[FreeTextEntry1] : Ms. Mandujano is a 72 yo RH woman who presents to [facility] [place] for MND/ALS diagnosed in 2021 (but sx started in 2019) that has been progressing. Initial presentation included progressive RUE weakness and ataxia, which began in 1/2020.  She is relatively stable. ALS-FRS from 31/48 to 29/48, speech deficit seems stable. UE are stronger than LEs. She has yet to get PFTs and would like to do voice banking.\par \par PLAN:\par -Start Radicava\par -GI referral for PEG placement\par -OT and PT\par -Pulmonary referral for PFTs\par -Please send out [id number] kit that she has at home \par -BiPAP needs to be fixed. C/w cough assist\par -Follow through with wheelchair process \par -Speech Therapy eval for voice banking and further therapy \par -F/u with ALSA. \par -F/u in 3 mo \par

## 2022-06-03 NOTE — REASON FOR VISIT
[Follow-Up: _____] : a [unfilled] follow-up visit [Family Member] : family member [FreeTextEntry1] : See below

## 2022-07-05 NOTE — REASON FOR VISIT
Quality 226: Preventive Care And Screening: Tobacco Use: Screening And Cessation Intervention: Patient screened for tobacco use, is a smoker AND received Cessation Counseling within the Previous 12 Months Quality 130: Documentation Of Current Medications In The Medical Record: Current Medications Documented [Follow-Up - Clinic] : a clinic follow-up of [Hyperlipidemia] : hyperlipidemia [Hypertension] : hypertension Quality 431: Preventive Care And Screening: Unhealthy Alcohol Use - Screening: Patient not identified as an unhealthy alcohol user when screened for unhealthy alcohol use using a systematic screening method Detail Level: Detailed

## 2022-09-09 NOTE — PHYSICAL EXAM
[FreeTextEntry1] : CN: 1-12 WNL\par Tongue atrophy, slight fasciculation, Sight weakness with tongue movements 2/4. NFNE: 3/5; uvula midline  \par \par Motor:\par R deltoid is 3/5 R, L deltoid 2/5 Triceps/Biceps 4+/5 R, Triceps/ Biceps 4/5 L\par \par Finger Extension R 4/5, L 4/5\par Wrist extension 4/5 b/l\par Hip flexion and ext b/l 2/5, atrophy over thigh muscles\par Knee ext b/l 3+/5  \par Knee flexion b/l 3-/5  \par Dorsiflexion L foot 0/5, R 1/5 \par L foot inversion 1/5 unable to harriet  \par R foot eversion better than L \par B/l foot drop L is worse than R  \par B/l temporal wasting, b/l hand muscle atrophy, FPL atrophy b/l.  \par Gait: Narrow, steppage, needs assistance \par \par Temp sensation decreased symmetrically below knee. Vibration decr below ankle b/l  \par \par DTR 3+UE b/l ankles nml  \par \par ALS-FRS 18/48

## 2022-09-09 NOTE — ASSESSMENT
[FreeTextEntry1] : Ms. Mandujano is a 70 yo RH woman who [place] to [facility] [place] for MND/ALS diagnosed in 2021 (but sx started in 2019) that has been progressing. Initial presentation included progressive RUE weakness and ataxia, which began in 1/2020. Speech has been worsening with drooling . ALS-FRS from  29/48 to 18/48. UE are stronger than LEs. She has yet to get PFTs and would like to do voice banking.\par \par PLAN:\par -Continue with Radicava and Riluzole \par -GI Followup for PEG placement\par -OT and PT\par -Pulmonary Followup  for PFTs\par -Start Scopolamine patch\par -Please send out [id number] kit that she has at home \par -BiPAP needs to be fixed. C/w cough assist\par -Follow through with wheelchair process and getting Ramp \par -Speech Therapy eval for voice banking and further therapy \par -F/u with ALSA. \par -F/u in 3 mo

## 2022-09-09 NOTE — END OF VISIT
[] : Resident [FreeTextEntry3] : I visited the patient with resident. Agree with history, physical exam and plan as above. Patient has the kit and will send it. Currently on both Radicava and Rilutek. Ref for Peg assessment. Reported increasing drooling. Start Scopolamine patch.

## 2022-09-09 NOTE — HISTORY OF PRESENT ILLNESS
[FreeTextEntry1] : Ms. Mandujano is a 72 yo RH woman with PMHx of DM, HLD, and HTN who presents to [facility] [place] for progressive ALS currently on BIPAP, diagnosed in 2021 (but sx started in 2019). Initial presentation included progressive RUE weakness and ataxia, which began in 1/2020. Last visit 3 mo ago she was relatively stable with mild 2 point decline in ALS-FRS; UE are stronger than LEs, and we started Radicava, initiated PEG/PFT eval, voice banking and genetic testing. Her BiPAP also needed to be fixed.  \par \par SHx: Son’s name is Jorge A \par ----------------------------------------- \par Did she send out genetic testing for [id number]? Says today that TESTING was NOT done, but she will do it. She was going to have one of her kids do it.  \par \par -Primary concerns: Since she had COVID about 1 mo ago, more reliant on wheelchair, LLE giving out more. LE response is slow/lag (like has to sit in bed for a while before can get up). \par -Speech: Speech has declined significantly. NO Voice banking done, likely too late now. \par -Swallowing/Nutrition/Weight: Sugar is well controlled now. Has not gone for PEG eval yet. Food/liquids spilling from mouth when eating. Can cut food with kitchen sheers herself. \par -Breathing: Pending PULM for NEXT WEEK. Cough assist was discontinued bc NO LONGER following at [facility]. NO LONGER has BIPAP machine.  \par -Sialorrhea: More drooling, using napkin when she eats/drinks water \par -Pseudobulbar affect/Mood: Has crying spells, frustration with ppl doing things for her.  is 74. \par -Pain: No pain.  \par -Mobility: Has insurance approved renewal of PT/OT/ST? Has AFO braces b/l. Any more falls? \par -Sleep: Poor, waking up every 2 hours to go to bathroom \par -Medication Tolerance: Tolerating Riluzole and Radicava. \par -Equipment Needs: Wheelchair eval pending for 2 weeks from now. Son saying ramp is not possible w/house structure. House may also not accommodate stair lift.  \par -Coping/Social Support: Spoke to REYMUNDO Lorenz, has VNS. Was all short term, and completed. Reached out to ALSA? Knew family friend who had ALS previously. Medicare doesn't really help with covering HHA/assistance for them. Family feels totally exhausted, falls on young son who is  at [place]. \par -Palliative Care: Not ready bc rest of family not on same page/denial. \par \par ALS-FRS 29/48 > 18/48

## 2022-09-14 PROBLEM — R13.10 DYSPHAGIA: Status: ACTIVE | Noted: 2022-01-01

## 2022-09-14 PROBLEM — R06.02 SHORTNESS OF BREATH: Status: ACTIVE | Noted: 2022-01-01

## 2022-09-18 NOTE — ED ADULT TRIAGE NOTE - CHIEF COMPLAINT QUOTE
BIBA from home c/o SOB was 86% on RA, EMS placed pt on 2LPM via NC with improvement in oxygen saturation

## 2022-09-18 NOTE — ED PROVIDER NOTE - CLINICAL SUMMARY MEDICAL DECISION MAKING FREE TEXT BOX
72-year-old female with past medical history of ALS follows up with Dr. Leblanc as her neurologist diabetes hyperlipidemia hypertension brought in by daughter for shortness of breath.  Patient is he lives at home with daughter mostly bedbound has been having some increased shortness of breath since Thursday but today really complained of worsening shortness of breath pulse ox at home dropped down to the 87 EMS arrived patient was satting 86 improved with 2 L.  Patient also complained of mild leg pain to the left no fevers chills cough chest pain shortness of breath nausea vomiting abdominal pain urinary complaints  cta chest demonstrated < from: CT Angio Chest PE Protocol w/ IV Cont (09.19.22 @ 02:26) >  1.  Small right lower lobe pulmonary emboli. No evidence of right heart   strain.2.  Left greater than right patchy consolidative opacities may represent atelectasis or pneumonia in the appropriate clinical setting. antibiotics given, lovenox given, icu consult patient admitted to sdu

## 2022-09-18 NOTE — ED PROVIDER NOTE - PROGRESS NOTE DETAILS
AH - pt desaturates to 90% on RA, 97% on 2L NC; pending labs, imaging AH - Trop elevated, elevated bnp, no prior labs; small R PE and L PNA; lovenox given; spoke to ICU fellow, approved for SDU under ever

## 2022-09-18 NOTE — ED PROVIDER NOTE - ATTENDING CONTRIBUTION TO CARE
I personally evaluated the patient. I reviewed the Resident’s or Physician Assistant’s note (as assigned above), and agree with the findings and plan except as documented in my note.  72-year-old female with past medical history of ALS follows up with Dr. Leblanc as her neurologist diabetes hyperlipidemia hypertension brought in by daughter for shortness of breath.  Patient is he lives at home with daughter mostly bedbound has been having some increased shortness of breath since Thursday but today really complained of worsening shortness of breath pulse ox at home dropped down to the 87 EMS arrived patient was satting 86 improved with 2 L.  Patient also complained of mild leg pain to the left no fevers chills cough chest pain shortness of breath nausea vomiting abdominal pain urinary complaints CONSTITUTIONAL: WA / WN / NAD  HEAD: NCAT  EYES: PERRL; EOMI;   ENT: Normal pharynx; mucous membranes pink/moist, no erythema.  NECK: Supple; no meningeal signs  CARD: RRR; nl S1/S2; no M/R/G.   RESP: Respiratory rate and effort are normal hypoxic on room air improves with 2L NC to 95  ABD: Soft, NT ND   MSK/EXT: No gross deformities; mild erythema to medial left lower extremity 2+ pitting edema b/l  SKIN: Warm and dry; stage 1 sacral decuib  NEURO: AAOx3,   PSYCH: Memory Intact, Normal Affect

## 2022-09-18 NOTE — ED PROVIDER NOTE - PHYSICAL EXAMINATION
CONSTITUTIONAL: in no acute distress, afebrile  SKIN: Warm, dry  HEAD: Normocephalic; atraumatic  EYES: No conjunctival injection. EOMI  ENT: No nasal discharge; oropharynx nonerythematous; airway clear  CARD:  tachycardic regular  RESP: CTAB; No wheezes, crackles, rales or rhonchi  ABD: Soft NTND; No guarding or rebound tenderness  EXT: Normal ROM.  No clubbing or cyanosis.  bilateral pitting edema, worse on L  NEURO: A&O x0, grossly unremarkable, moving all extremities  *Chaperone MD Duncan was used during the encounter. A professional environment was maintained and discussed with patient

## 2022-09-18 NOTE — ED PROVIDER NOTE - OBJECTIVE STATEMENT
72-year-old female with PMH of ALS, DM, HTN, HLD, nonverbal who presents with shortness of breath x1 day.  Patient reportedly 86% on room air at home.  Patient does not use home O2.  Patient is bedbound.  Daughter stating left leg is more swollen and complaining of pain.  No known fevers, chills, chest pain, abdominal pain, nausea, vomiting, rash.

## 2022-09-19 PROBLEM — G12.21 ALS (AMYOTROPHIC LATERAL SCLEROSIS): Status: ACTIVE | Noted: 2021-01-01

## 2022-09-19 NOTE — CHART NOTE - NSCHARTNOTEFT_GEN_A_CORE
Transfer Note    Transfer from: SDU  Transfer to: MED    HPI: 72-year-old female with PMHx of ALS, DM, HTN, HLD, recent covid presents with shortness of breath x1 day.  Patient reportedly 86% on room air at home. No other complaints except for LE swelling and pain at the buttocks. Denies fevers, chills, chest pain, cough, abdominal pain, nausea, vomiting, rash. Per d/w daughter, ALS diagnosed 2020. Has recently begun to progress rapidly. Patient was able to ambulate one week prior to presentation using rolling walker. Now patient has been bed bound, too weak to ambulate. Pt also with worsening dysphagia per daughter. Family requesting social work/ case management assistance in helping coordinate safe discharge and equipment to help take care of patient at home.     in the ED,pt's VS T(C): 36.4 -36.9  HR:  (67 - 98)  BP: (106/71 - 135/72)  RR:  (17 - 18)  SpO2:  (97% - 99%); NRB   labs done showed no wbc, anemia 10.2 Hb, INR 0.96, Na 149, bicarb 36, VBG with hypercapnia, troponin 0.08  CT chest with small RLL pulmonary embolus w/o RHS and bibasilar patchy consolidative opacities   pt received lovenox, rocephin azithromycin  pt is admitted for workup/management PE/ worsening ALS    PT started on therapeutic a/c with lovenox. Pt had episode while on 2L NC of O2 sat in low 90s and placed on NRB which increased O2 sat to high 90s. Pt then later changed back to VA duplex neg for LE DVT. GI consulted for possible PEG placement.    COURSE:    VITAL SIGNS: Last 24 Hours  T(C): 36.8 (19 Sep 2022 16:51), Max: 36.8 (19 Sep 2022 16:51)  T(F): 98.2 (19 Sep 2022 16:51), Max: 98.2 (19 Sep 2022 16:51)  HR: 92 (19 Sep 2022 19:50) (85 - 111)  BP: 122/66 (19 Sep 2022 19:50) (90/65 - 175/91)  BP(mean): 116 (19 Sep 2022 12:49) (116 - 116)  RR: 16 (19 Sep 2022 19:50) (16 - 18)  SpO2: 97% (19 Sep 2022 19:50) (93% - 100%)    PHYSICAL EXAM:  General: well-appearing in NAD.  HEENT: Normocephalic, nontraumatic. PERRL.  LUNGS: Clear to auscultation b/l. No wheezes, rales, or rhonchi.  HEART: RRR. No murmurs, rubs, or gallops.  ABDOMEN: Soft, nontender, nondistended. + bowel sounds.  EXT: Pulses palpable x 4. No lower extremity edema.  NEURO: AAOx4.  SKIN: Warm, dry.    MEDICATIONS:  STANDING MEDICATIONS  ampicillin/sulbactam  IVPB      ampicillin/sulbactam  IVPB 1.5 Gram(s) IV Intermittent every 6 hours  atorvastatin Oral Tab/Cap - Peds 10 milliGRAM(s) Oral daily  chlorhexidine 2% Cloths 1 Application(s) Topical <User Schedule>  dextrose 5%. 1000 milliLiter(s) IV Continuous <Continuous>  dextrose 5%. 1000 milliLiter(s) IV Continuous <Continuous>  dextrose 50% Injectable 25 Gram(s) IV Push once  dextrose 50% Injectable 12.5 Gram(s) IV Push once  dextrose 50% Injectable 25 Gram(s) IV Push once  enoxaparin Injectable 40 milliGRAM(s) SubCutaneous every 12 hours  glucagon  Injectable 1 milliGRAM(s) IntraMuscular once  insulin lispro (ADMELOG) corrective regimen sliding scale   SubCutaneous three times a day before meals  losartan 25 milliGRAM(s) Oral daily  riluzole 50 milliGRAM(s) Oral two times a day  scopolamine 1 mG/72 Hr(s) Patch 1 Patch Transdermal every 72 hours    PRN MEDICATIONS  acetaminophen     Tablet .. 650 milliGRAM(s) Oral every 6 hours PRN  aluminum hydroxide/magnesium hydroxide/simethicone Suspension 30 milliLiter(s) Oral every 4 hours PRN  dextrose Oral Gel 15 Gram(s) Oral once PRN  melatonin 3 milliGRAM(s) Oral at bedtime PRN  ondansetron Injectable 4 milliGRAM(s) IV Push every 8 hours PRN      VITAL SIGNS: Last 24 Hours  T(C): 36.8 (19 Sep 2022 16:51), Max: 36.8 (19 Sep 2022 16:51)  T(F): 98.2 (19 Sep 2022 16:51), Max: 98.2 (19 Sep 2022 16:51)  HR: 92 (19 Sep 2022 19:50) (85 - 111)  BP: 122/66 (19 Sep 2022 19:50) (90/65 - 175/91)  BP(mean): 116 (19 Sep 2022 12:49) (116 - 116)  RR: 16 (19 Sep 2022 19:50) (16 - 18)  SpO2: 97% (19 Sep 2022 19:50) (93% - 100%)    LABS:                        10.0   7.85  )-----------( 125      ( 19 Sep 2022 12:06 )             30.5     09-19    149<H>  |  104  |  27<H>  ----------------------------<  86  3.1<L>   |  34<H>  |  0.7    Ca    9.0      19 Sep 2022 12:06  Mg     1.8     09-19    TPro  5.4<L>  /  Alb  4.0  /  TBili  0.9  /  DBili  x   /  AST  41  /  ALT  30  /  AlkPhos  95  09-19    PT/INR - ( 18 Sep 2022 23:10 )   PT: 11.10 sec;   INR: 0.96 ratio    PTT - ( 18 Sep 2022 23:10 )  PTT:29.9 sec    CARDIAC MARKERS ( 19 Sep 2022 12:06 )  x     / 0.08 ng/mL / x     / x     / x      CARDIAC MARKERS ( 18 Sep 2022 23:10 )  x     / 0.08 ng/mL / x     / x     / x          RADIOLOGY:    < from: Xray Chest 1 View-PORTABLE IMMEDIATE (09.19.22 @ 00:42) >  Impression:  Left basal opacities. No pneumothorax.    < from: CT Angio Chest PE Protocol w/ IV Cont (09.19.22 @ 02:26) >  IMPRESSION:  1.  Small right lower lobe pulmonary emboli. No evidence of right heart   strain.  2.  Left greater than right patchy consolidative opacities may represent   atelectasis or pneumonia in the appropriate clinical setting.    < from: VA Duplex Lower Ext Vein Scan, Bilat (09.19.22 @ 08:14) >  IMPRESSION:  No evidence of deep venous thrombosis in either lower extremity.      ASSESSMENT & PLAN:     72 year old female with pmhx of ALS, HTN, HLD, DM presents for acute sob found to have RLL PE, no RHS. started on lovenox therapeutic.     #acute hypoxic respiratory failure  #chronic hypercapnia secondary to ALS  #RLL pulmonary embolus/ No RHS  #elevated troponin; likely demand ischemia  **CT PE: Small right lower lobe pulmonary emboli. No evidence of right heart strain.  -on NRB at time of my exam; satting 100%; will titrate to NC as tolerated  -denies chest pain  -trend trops  -started lovenox therapeutic BID    #?PNA; unlikley  #HO Covid  **Patchy bibasilar, left greater than right, consolidative opacities  -CT findings likely related to covid/bed bound (atlectasis)/micro aspirations  -no wbc/ no cough/ no fevers  -monitor off abx for now  -trend cbc/ temperature  -can f/u strep/legionella studies    #ALS  **rapidly progressing per family  **was able to abulate with help one week prior to presentation; now with swallowing difficulties/ cannot ambulate  -follows with Dr. Leblanc  -family requestiong CM/SW help in safe discharge planning  -needs proper equipement to help take care of patient at home  -f/u CM/SW  -f/u SLP for diet recs; will place on pureed as per pt request  -PT/OT  -f/u neurology o/p if no inpatient management (neuro c/s)    #DM  #HTN  -c/w home meds  -monitor fs; if over 180 start lispro/glargine  -SS for now    dvt ppx  gi ppx  diet pureed  activity PT/OT    For Follow-Up: Transfer Note    Transfer from: SDU  Transfer to: MED    HPI: 72-year-old female with PMHx of ALS, DM, HTN, HLD, recent covid presents with shortness of breath x1 day.  Patient reportedly 86% on room air at home. No other complaints except for LE swelling and pain at the buttocks. Denies fevers, chills, chest pain, cough, abdominal pain, nausea, vomiting, rash. Per d/w daughter, ALS diagnosed 2020. Has recently begun to progress rapidly. Patient was able to ambulate one week prior to presentation using rolling walker. Now patient has been bed bound, too weak to ambulate. Pt also with worsening dysphagia per daughter. Family requesting social work/ case management assistance in helping coordinate safe discharge and equipment to help take care of patient at home.     COURSE:    In the ED, pt's VS T(C): 36.4 -36.9  HR:  (67 - 98)  BP: (106/71 - 135/72)  RR:  (17 - 18)  SpO2:  (97% - 99%); NRB   labs done showed no wbc, anemia 10.2 Hb, INR 0.96, Na 149, bicarb 36, VBG with hypercapnia, troponin 0.08  CT chest with small RLL pulmonary embolus w/o RHS and bibasilar patchy consolidative opacities   pt received lovenox, rocephin azithromycin  pt is admitted for workup/management PE/ worsening ALS    PT started on therapeutic a/c with lovenox. Pt had episode while on 2L NC of O2 sat in low 90s and placed on NRB which increased O2 sat to high 90s. Pt then later changed back to NC, and satting in high 90s. VA duplex neg for LE DVT. Swallow evaluation recommended PO puree diet w/ mildly thick liquids, but also PEG for long-term nutritional needs. GI consulted for possible PEG placement.    VITAL SIGNS: Last 24 Hours  T(C): 36.8 (19 Sep 2022 16:51), Max: 36.8 (19 Sep 2022 16:51)  T(F): 98.2 (19 Sep 2022 16:51), Max: 98.2 (19 Sep 2022 16:51)  HR: 92 (19 Sep 2022 19:50) (85 - 111)  BP: 122/66 (19 Sep 2022 19:50) (90/65 - 175/91)  BP(mean): 116 (19 Sep 2022 12:49) (116 - 116)  RR: 16 (19 Sep 2022 19:50) (16 - 18)  SpO2: 97% (19 Sep 2022 19:50) (93% - 100%)    PHYSICAL EXAM:  General: well-appearing in NAD.  HEENT: Normocephalic, nontraumatic. PERRL.  LUNGS: Clear to auscultation b/l. No wheezes, rales, or rhonchi.  HEART: RRR. No murmurs, rubs, or gallops.  ABDOMEN: Soft, nontender, nondistended. + bowel sounds.  EXT: Pulses palpable x 4. No lower extremity edema.  NEURO: AAOx4.  SKIN: Warm, dry.    MEDICATIONS:  STANDING MEDICATIONS  ampicillin/sulbactam  IVPB      ampicillin/sulbactam  IVPB 1.5 Gram(s) IV Intermittent every 6 hours  atorvastatin Oral Tab/Cap - Peds 10 milliGRAM(s) Oral daily  chlorhexidine 2% Cloths 1 Application(s) Topical <User Schedule>  dextrose 5%. 1000 milliLiter(s) IV Continuous <Continuous>  dextrose 5%. 1000 milliLiter(s) IV Continuous <Continuous>  dextrose 50% Injectable 25 Gram(s) IV Push once  dextrose 50% Injectable 12.5 Gram(s) IV Push once  dextrose 50% Injectable 25 Gram(s) IV Push once  enoxaparin Injectable 40 milliGRAM(s) SubCutaneous every 12 hours  glucagon  Injectable 1 milliGRAM(s) IntraMuscular once  insulin lispro (ADMELOG) corrective regimen sliding scale   SubCutaneous three times a day before meals  losartan 25 milliGRAM(s) Oral daily  riluzole 50 milliGRAM(s) Oral two times a day  scopolamine 1 mG/72 Hr(s) Patch 1 Patch Transdermal every 72 hours    PRN MEDICATIONS  acetaminophen     Tablet .. 650 milliGRAM(s) Oral every 6 hours PRN  aluminum hydroxide/magnesium hydroxide/simethicone Suspension 30 milliLiter(s) Oral every 4 hours PRN  dextrose Oral Gel 15 Gram(s) Oral once PRN  melatonin 3 milliGRAM(s) Oral at bedtime PRN  ondansetron Injectable 4 milliGRAM(s) IV Push every 8 hours PRN      VITAL SIGNS: Last 24 Hours  T(C): 36.8 (19 Sep 2022 16:51), Max: 36.8 (19 Sep 2022 16:51)  T(F): 98.2 (19 Sep 2022 16:51), Max: 98.2 (19 Sep 2022 16:51)  HR: 92 (19 Sep 2022 19:50) (85 - 111)  BP: 122/66 (19 Sep 2022 19:50) (90/65 - 175/91)  BP(mean): 116 (19 Sep 2022 12:49) (116 - 116)  RR: 16 (19 Sep 2022 19:50) (16 - 18)  SpO2: 97% (19 Sep 2022 19:50) (93% - 100%)    LABS:                        10.0   7.85  )-----------( 125      ( 19 Sep 2022 12:06 )             30.5     09-19    149<H>  |  104  |  27<H>  ----------------------------<  86  3.1<L>   |  34<H>  |  0.7    Ca    9.0      19 Sep 2022 12:06  Mg     1.8     09-19    TPro  5.4<L>  /  Alb  4.0  /  TBili  0.9  /  DBili  x   /  AST  41  /  ALT  30  /  AlkPhos  95  09-19    PT/INR - ( 18 Sep 2022 23:10 )   PT: 11.10 sec;   INR: 0.96 ratio    PTT - ( 18 Sep 2022 23:10 )  PTT:29.9 sec    CARDIAC MARKERS ( 19 Sep 2022 12:06 )  x     / 0.08 ng/mL / x     / x     / x      CARDIAC MARKERS ( 18 Sep 2022 23:10 )  x     / 0.08 ng/mL / x     / x     / x          RADIOLOGY:    < from: Xray Chest 1 View-PORTABLE IMMEDIATE (09.19.22 @ 00:42) >  Impression:  Left basal opacities. No pneumothorax.    < from: CT Angio Chest PE Protocol w/ IV Cont (09.19.22 @ 02:26) >  IMPRESSION:  1.  Small right lower lobe pulmonary emboli. No evidence of right heart   strain.  2.  Left greater than right patchy consolidative opacities may represent   atelectasis or pneumonia in the appropriate clinical setting.    < from: VA Duplex Lower Ext Vein Scan, Bilat (09.19.22 @ 08:14) >  IMPRESSION:  No evidence of deep venous thrombosis in either lower extremity.      ASSESSMENT & PLAN:     72 year old female with pmhx of ALS, HTN, HLD, DM presents for acute sob found to have RLL PE, no RHS. started on lovenox therapeutic.     #acute hypoxic respiratory failure  #chronic hypercapnia secondary to ALS  #RLL pulmonary embolus/ No RHS  #elevated troponin; likely demand ischemia  **CT PE: Small right lower lobe pulmonary emboli. No evidence of right heart strain.  -on NRB at time of my exam; satting 100%; will titrate to NC as tolerated  -denies chest pain  -trend trops  -started lovenox therapeutic BID    #?PNA; unlikley  #HO Covid  **Patchy bibasilar, left greater than right, consolidative opacities  -CT findings likely related to covid/bed bound (atlectasis)/micro aspirations  -no wbc/ no cough/ no fevers  -monitor off abx for now  -trend cbc/ temperature  -can f/u strep/legionella studies    #ALS  **rapidly progressing per family  **was able to abulate with help one week prior to presentation; now with swallowing difficulties/ cannot ambulate  -follows with Dr. Leblanc  -family requestiong CM/SW help in safe discharge planning  -needs proper equipement to help take care of patient at home  -f/u CM/SW  -f/u SLP for diet recs; will place on pureed as per pt request  -PT/OT  -f/u neurology o/p if no inpatient management (neuro c/s)    #DM  #HTN  -c/w home meds  -monitor fs; if over 180 start lispro/glargine  -SS for now    dvt ppx  gi ppx  diet pureed  activity PT/OT    For Follow-Up:  GI consult PEG tube  GOC - Palliative on board  Continue Riluzole and Radicava for ALS Transfer Note    Transfer from: SDU  Transfer to: MED    HPI: 72-year-old female with PMHx of ALS, DM, HTN, HLD, recent covid presents with shortness of breath x1 day.  Patient reportedly 86% on room air at home. No other complaints except for LE swelling and pain at the buttocks. Denies fevers, chills, chest pain, cough, abdominal pain, nausea, vomiting, rash. Per d/w daughter, ALS diagnosed 2020. Has recently begun to progress rapidly. Patient was able to ambulate one week prior to presentation using rolling walker. Now patient has been bed bound, too weak to ambulate. Pt also with worsening dysphagia per daughter. Family requesting social work/ case management assistance in helping coordinate safe discharge and equipment to help take care of patient at home.     COURSE:    In the ED, pt's VS T(C): 36.4 -36.9  HR:  (67 - 98)  BP: (106/71 - 135/72)  RR:  (17 - 18)  SpO2:  (97% - 99%); NRB   labs done showed no wbc, anemia 10.2 Hb, INR 0.96, Na 149, bicarb 36, VBG with hypercapnia, troponin 0.08  CT chest with small RLL pulmonary embolus w/o RHS and bibasilar patchy consolidative opacities   pt received lovenox, rocephin azithromycin  pt is admitted for workup/management PE/ worsening ALS    PT started on therapeutic a/c with lovenox. Pt had episode while on 2L NC of O2 sat in low 90s and placed on NRB which increased O2 sat to high 90s. Pt then later changed back to NC, and satting in high 90s. VA duplex neg for LE DVT. Swallow evaluation recommended PO puree diet w/ mildly thick liquids, but also PEG for long-term nutritional needs. GI consulted for possible PEG placement.    VITAL SIGNS: Last 24 Hours  T(C): 36.8 (19 Sep 2022 16:51), Max: 36.8 (19 Sep 2022 16:51)  T(F): 98.2 (19 Sep 2022 16:51), Max: 98.2 (19 Sep 2022 16:51)  HR: 92 (19 Sep 2022 19:50) (85 - 111)  BP: 122/66 (19 Sep 2022 19:50) (90/65 - 175/91)  BP(mean): 116 (19 Sep 2022 12:49) (116 - 116)  RR: 16 (19 Sep 2022 19:50) (16 - 18)  SpO2: 97% (19 Sep 2022 19:50) (93% - 100%)    PHYSICAL EXAM:  General: well-appearing in NAD.  HEENT: Normocephalic, nontraumatic. PERRL.  LUNGS: Clear to auscultation b/l. No wheezes, rales, or rhonchi.  HEART: RRR. No murmurs, rubs, or gallops.  ABDOMEN: Soft, nontender, nondistended. + bowel sounds.  EXT: Pulses palpable x 4. No lower extremity edema.  NEURO: AAOx4.  SKIN: Warm, dry.    MEDICATIONS:  STANDING MEDICATIONS  ampicillin/sulbactam  IVPB      ampicillin/sulbactam  IVPB 1.5 Gram(s) IV Intermittent every 6 hours  atorvastatin Oral Tab/Cap - Peds 10 milliGRAM(s) Oral daily  chlorhexidine 2% Cloths 1 Application(s) Topical <User Schedule>  dextrose 5%. 1000 milliLiter(s) IV Continuous <Continuous>  dextrose 5%. 1000 milliLiter(s) IV Continuous <Continuous>  dextrose 50% Injectable 25 Gram(s) IV Push once  dextrose 50% Injectable 12.5 Gram(s) IV Push once  dextrose 50% Injectable 25 Gram(s) IV Push once  enoxaparin Injectable 40 milliGRAM(s) SubCutaneous every 12 hours  glucagon  Injectable 1 milliGRAM(s) IntraMuscular once  insulin lispro (ADMELOG) corrective regimen sliding scale   SubCutaneous three times a day before meals  losartan 25 milliGRAM(s) Oral daily  riluzole 50 milliGRAM(s) Oral two times a day  scopolamine 1 mG/72 Hr(s) Patch 1 Patch Transdermal every 72 hours    PRN MEDICATIONS  acetaminophen     Tablet .. 650 milliGRAM(s) Oral every 6 hours PRN  aluminum hydroxide/magnesium hydroxide/simethicone Suspension 30 milliLiter(s) Oral every 4 hours PRN  dextrose Oral Gel 15 Gram(s) Oral once PRN  melatonin 3 milliGRAM(s) Oral at bedtime PRN  ondansetron Injectable 4 milliGRAM(s) IV Push every 8 hours PRN      VITAL SIGNS: Last 24 Hours  T(C): 36.8 (19 Sep 2022 16:51), Max: 36.8 (19 Sep 2022 16:51)  T(F): 98.2 (19 Sep 2022 16:51), Max: 98.2 (19 Sep 2022 16:51)  HR: 92 (19 Sep 2022 19:50) (85 - 111)  BP: 122/66 (19 Sep 2022 19:50) (90/65 - 175/91)  BP(mean): 116 (19 Sep 2022 12:49) (116 - 116)  RR: 16 (19 Sep 2022 19:50) (16 - 18)  SpO2: 97% (19 Sep 2022 19:50) (93% - 100%)    LABS:                        10.0   7.85  )-----------( 125      ( 19 Sep 2022 12:06 )             30.5     09-19    149<H>  |  104  |  27<H>  ----------------------------<  86  3.1<L>   |  34<H>  |  0.7    Ca    9.0      19 Sep 2022 12:06  Mg     1.8     09-19    TPro  5.4<L>  /  Alb  4.0  /  TBili  0.9  /  DBili  x   /  AST  41  /  ALT  30  /  AlkPhos  95  09-19    PT/INR - ( 18 Sep 2022 23:10 )   PT: 11.10 sec;   INR: 0.96 ratio    PTT - ( 18 Sep 2022 23:10 )  PTT:29.9 sec    CARDIAC MARKERS ( 19 Sep 2022 12:06 )  x     / 0.08 ng/mL / x     / x     / x      CARDIAC MARKERS ( 18 Sep 2022 23:10 )  x     / 0.08 ng/mL / x     / x     / x          RADIOLOGY:    < from: Xray Chest 1 View-PORTABLE IMMEDIATE (09.19.22 @ 00:42) >  Impression:  Left basal opacities. No pneumothorax.    < from: CT Angio Chest PE Protocol w/ IV Cont (09.19.22 @ 02:26) >  IMPRESSION:  1.  Small right lower lobe pulmonary emboli. No evidence of right heart   strain.  2.  Left greater than right patchy consolidative opacities may represent   atelectasis or pneumonia in the appropriate clinical setting.    < from: VA Duplex Lower Ext Vein Scan, Bilat (09.19.22 @ 08:14) >  IMPRESSION:  No evidence of deep venous thrombosis in either lower extremity.      ASSESSMENT & PLAN:     72 year old female with pmhx of ALS, HTN, HLD, DM presents for acute sob found to have RLL PE, no RHS. started on lovenox therapeutic.     #acute hypoxic respiratory failure  #chronic hypercapnia secondary to ALS  #RLL pulmonary embolus/ No RHS  #elevated troponin; likely demand ischemia  **CT PE: Small right lower lobe pulmonary emboli. No evidence of right heart strain.  - TTE 9/19/22 w EF >55%, normal LV systolic function, no RV enlargement   - VA duplex BLE veins 9/19/22 w/o evidence of DVT   -on NRB at time of my exam; satting 100%; will titrate to NC as tolerated  -denies chest pain  -trend trops: first two 0.08  -started lovenox therapeutic BID    #?PNA; unlikley  #HO Covid  **Patchy bibasilar, left greater than right, consolidative opacities  -CT findings likely related to covid/bed bound (atlectasis)/micro aspirations  -no wbc/ no cough/ no fevers  -monitor off abx for now  -trend cbc/ temperature  -can f/u strep/legionella studies    #ALS  **rapidly progressing per family  **was able to ambulate with help one week prior to presentation; now with swallowing difficulties/ cannot ambulate  -follows with Dr. Leblanc  -family requesting CM/SW help in safe discharge planning; needs proper equipment to help take care of patient at home  -f/u CM/SW  -SLP diet recs: Puree diet w mildly thick liquids but will need PEG to maintain nutritional status   -GI consult for PEG placement   -PT/OT  -f/u neurology o/p if no inpatient management (neuro c/s)    #DM  #HTN  -c/w home meds  -monitor fs; if over 180 start lispro/glargine  -SS for now    dvt ppx: n/a; already on therapeutic   gi ppx  diet pureed  activity PT/OT    For Follow-Up:  - GI consult PEG tube  - GOC - Palliative on board  - Continue Riluzole and Radicava for ALS, f/u neuro recs

## 2022-09-19 NOTE — PHYSICAL THERAPY INITIAL EVALUATION ADULT - SPECIFY REASON(S)
Pt with dx of PE, enoxaparin given at 627am, per [facility], pt cleared to mobilize >5 hours after anticoag administration. PT to f/u as appropriate.

## 2022-09-19 NOTE — H&P ADULT - ATTENDING COMMENTS
72-year-old female with PMHx of ALS, DM, HTN, HLD, recent covid presents with shortness of breath x1 day.  Patient reportedly 86% on room air at home. No other complaints except for LE swelling and pain at the buttocks. Denies fevers, chills, chest pain, cough, abdominal pain, nausea, vomiting, rash.    #acute hypoxic respiratory failure  #chronic hypercapnia secondary to ALS  #RLL pulmonary embolus/ No RHS  CT PE: Smal RLL PE; Patchy LLL consoladative opacities  Unlikely pneumonia, no leukocytosis, fevers  No RV dysfunction on echo  - Cont therapeutic Lovenox  - Monitor off antibiotics  - Wean off O2 as tolerated    #ALS  - f/u neurology recommendations  - Cont home meds  - Palliative care eval for GOC given rapid progression of disease  - Puree diet w/ mildly thick liquids per s/s    #HTN/HLD  - Cont losartan 25mg qD  - Cont atorvastatin 10mg qD    #DM2  - ISS    DVT PPX, Lovenox    #Progress Note Handoff  Pending (specify): Wean off O2 as tolerated  Family discussion: d/w pt and family regarding tx for PE  Disposition: Home with services 72-year-old female with PMHx of ALS, DM, HTN, HLD, recent covid presents with shortness of breath x1 day.  Patient reportedly 86% on room air at home. No other complaints except for LE swelling and pain at the buttocks. Denies fevers, chills, chest pain, cough, abdominal pain, nausea, vomiting, rash.    #acute hypoxic respiratory failure  #chronic hypercapnia secondary to ALS  #RLL pulmonary embolus/ No RHS  CT PE: Smal RLL PE; Patchy LLL consoladative opacities  Unlikely pneumonia, no leukocytosis, fevers  No RV dysfunction on echo  - Cont therapeutic Lovenox  - unasyn 1.5 q6h for now, f/u procalcitonin  - Wean off O2 as tolerated    #ALS  - f/u neurology recommendations  - Cont home meds  - Palliative care eval for GOC given rapid progression of disease  - Puree diet w/ mildly thick liquids per s/s    #HTN/HLD  - Cont losartan 25mg qD  - Cont atorvastatin 10mg qD    #DM2  - ISS    DVT PPX, Lovenox    #Progress Note Handoff  Pending (specify): Wean off O2 as tolerated  Family discussion: d/w pt and family regarding tx for PE  Disposition: Home with services

## 2022-09-19 NOTE — H&P ADULT - HISTORY OF PRESENT ILLNESS
72-year-old female with PMH of ALS, DM, HTN, HLD presents with shortness of breath x1 day.  Patient reportedly 86% on room air at home. No other complaints except for LE swelling and pain at the buttocks. Denies fevers, chills, chest pain, cough, abdominal pain, nausea, vomiting, rash.  Per d/w daughter, pt has ALS diagnosed 2020. Has recently begun to progress reapidly. Patient was able to ambulate one week prior to presentation using rolling walker. Now patient has been bed bound, too weak to ambulate. Pt also with worsening dysphagia per daughter. Family requesting social work/ case management assistance in helping coordinate safe discharge and equipment to help take care of patient at home.    72-year-old female with PMHx of ALS, DM, HTN, HLD, recent covid presents with shortness of breath x1 day.  Patient reportedly 86% on room air at home. No other complaints except for LE swelling and pain at the buttocks. Denies fevers, chills, chest pain, cough, abdominal pain, nausea, vomiting, rash.  Per d/w daughter, ALS diagnosed 2020. Has recently begun to progress rapidly. Patient was able to ambulate one week prior to presentation using rolling walker. Now patient has been bed bound, too weak to ambulate. Pt also with worsening dysphagia per daughter. Family requesting social work/ case management assistance in helping coordinate safe discharge and equipment to help take care of patient at home.     in the ED,pt's VS T(C): 36.4 -36.9  HR:  (67 - 98)  BP: (106/71 - 135/72)  RR:  (17 - 18)  SpO2:  (97% - 99%); now on NRB   labs done showed no wbc, anemia 10.2 Hb, INR 0.96, Na 149, bicarb 36, VBG with hypercapnia, troponin 0.08  CT chest with small RLL pulmonary embolus w/o RHS and bibasilar patchy consolidative opacities   pt received lovenox, rocephin azithromycin  pt is admitted for workup/management PE/ worsening ALS

## 2022-09-19 NOTE — SWALLOW BEDSIDE ASSESSMENT ADULT - CONSISTENCIES ADMINISTERED
1oz water, 2oz mildly thick, 1tsp minced & moist, 2oz puree/thin liquid/mildly thick/pureed/minced & moist

## 2022-09-19 NOTE — REASON FOR VISIT
[Initial] : an initial visit [Shortness of Breath] : shortness of breath [TextBox_44] : shortness of breath

## 2022-09-19 NOTE — SWALLOW BEDSIDE ASSESSMENT ADULT - SLP GENERAL OBSERVATIONS
Pt. received awake, 2L NC, +severely dysarthric speech, +gen weakness, no c/o pain. Accompanied by son at bedside

## 2022-09-19 NOTE — ED ADULT NURSE REASSESSMENT NOTE - NS ED NURSE REASSESS COMMENT FT1
Around 5 am patient became hypoxic, MD was notified, Non rebreather with 15L was used, oxygen went back up

## 2022-09-19 NOTE — CONSULT NOTE ADULT - ASSESSMENT
72yFemale with PMH including ALS, DM, HTN, HLD, recent covid, being evaluated for GOC in the setting of admission for hypoxia, CT showed small RLL PE, possible aspiration pna.     MEDD (morphine equivalent daily dose):      See Recs below.    Please call [phone] with questions or concerns 24/7.   We will continue to follow.     Discussed with primary MD.

## 2022-09-19 NOTE — SWALLOW BEDSIDE ASSESSMENT ADULT - SWALLOW EVAL: RECOMMENDED DIET
Puree diet w/ mildly thick liquids. Puree diet w/ mildly thick liquids and long term means for nutrition/ hydration

## 2022-09-19 NOTE — ED ADULT NURSE REASSESSMENT NOTE - NS ED NURSE REASSESS COMMENT FT1
MD notified pt was unable to urinate, pt retaining since yesterday, zambrano placed & 900ml of urine output, MD notified pt sugar level 89, and not really eatingor drinking with thickening syrup, maintenance fluids requested and was denied, daughter at bedside encouraging patient to eat, food provided and family purchased cafe food, no SOB, unlabored breathing, equal rise & fall, no N/V/D, c/o sacral pain, pt turned every 2 hours to relieve pressure from area.

## 2022-09-19 NOTE — ASSESSMENT
[FreeTextEntry1] : Severe ALS/neurmoscular disease. \par \par Attempt to perform spirometry in the office; could not exhale more than 3 seconds; has restriction on davey related to her advanced neuromuscular disease. \par \par Given those findings will order: \par \par - Cough assist device. \par \par - Trilogy: IPAP (10-16) EPAP (5-8) RR 14 \par \par F/U in 1-2 months

## 2022-09-19 NOTE — H&P ADULT - ASSESSMENT
72 year old female with pmhx of ALS, HTN, HLD, DM presents for acute sob found to have RLL PE, no RHS. started on lovenox therapeutic.     #acute hypoxic respiratory failure  #chronic hypercapnia secondary to ALS  #RLL pulmonary embolus/ No RHS  #elevated troponin; likely demand ischemia  **CT PE: Small right lower lobe pulmonary emboli. No evidence of right heart strain.  -on NRB at time of my exam; satting 100%; will titrate to NC as tolerated  -denies chest pain  -trend trops  -started lovenox therapeutic BID    #?PNA; unlikley  #HO Covid  **Patchy bibasilar, left greater than right, consolidative opacities  -no wbc/ no cough/ no fevers  -monitor off abx for now  -trend cbc/ temperature  -can f/u strep/legionella studies    #ALS  **rapidly progressing per family  **was able to abulate with help one week prior to presentation; now with swallowing difficulties/ cannot ambulate  -follows with Dr. Leblanc  -family requestiong CM/SW help in safe discharge planning  -needs proper equipement to help take care of patient at home  -f/u CM/SW  -f/u SLP for diet recs; will place on pureed as per pt request  -f/u neurology o/p    #DM  #HTN  -c/w home meds    dvt ppx  gi ppx  diet pureed  activity PT/OT 72 year old female with pmhx of ALS, HTN, HLD, DM presents for acute sob found to have RLL PE, no RHS. started on lovenox therapeutic.     #acute hypoxic respiratory failure  #chronic hypercapnia secondary to ALS  #RLL pulmonary embolus/ No RHS  #elevated troponin; likely demand ischemia  **CT PE: Small right lower lobe pulmonary emboli. No evidence of right heart strain.  -on NRB at time of my exam; satting 100%; will titrate to NC as tolerated  -denies chest pain  -trend trops  -started lovenox therapeutic BID    #?PNA; unlikley  #HO Covid  **Patchy bibasilar, left greater than right, consolidative opacities  -CT findings likely related to covid/bed bound (atlectasis)/micro aspirations  -no wbc/ no cough/ no fevers  -monitor off abx for now  -trend cbc/ temperature  -can f/u strep/legionella studies    #ALS  **rapidly progressing per family  **was able to abulate with help one week prior to presentation; now with swallowing difficulties/ cannot ambulate  -follows with Dr. Leblanc  -family requestiong CM/SW help in safe discharge planning  -needs proper equipement to help take care of patient at home  -f/u CM/SW  -f/u SLP for diet recs; will place on pureed as per pt request  -PT/OT  -f/u neurology o/p if no inpatient management (neuro c/s)    #DM  #HTN  -c/w home meds  -monitor fs; if over 180 start lispro/glargine  -SS for now    dvt ppx  gi ppx  diet pureed  activity PT/OT

## 2022-09-19 NOTE — CONSULT NOTE ADULT - PROBLEM SELECTOR RECOMMENDATION 9
- patient chairfast at home, cared for by family  - is somewhat verbal but communicates via writing due to difficult communication  - will pursue GOC especially re: ventilatory wishes

## 2022-09-19 NOTE — CONSULT NOTE ADULT - ASSESSMENT
IMPRESSION:    Acute hypoxemic respiratory failure   PE unprovoked  Chronic hypercapnic respiratory failure likely secondary to NM disease ( ALS )   Possible aspiration     PLAN:    CNS:  No depressants.  Neuro Follow up     HEENT: Oral care.  Speech and swallow     PULMONARY:  HOB @ 45 degrees.  Aspiration precautions.  Wean O2 as tolerated.  NIV during sleep.  Will benefit from NIV att home.      CARDIOVASCULAR:  Avoid overload.  ECHO     GI: GI prophylaxis.  Feeding per speech and swallow.  Bowel regimen     RENAL:  Follow up lytes.  Correct as needed    INFECTIOUS DISEASE: Follow up cultures.  Unasyn for now.  Procal     HEMATOLOGICAL:  DVT therapy with LMWH.  LE Duplex.  OP Hem onc evaluation     ENDOCRINE:  Follow up FS.      MUSCULOSKELETAL:  Bed rest today     DGTF once off 100 O2.

## 2022-09-19 NOTE — SWALLOW BEDSIDE ASSESSMENT ADULT - COMMENTS
Pt's son reports worsening dysphagia x1 week ago she was eating soft, cut up meals now food is ground/puree consistency. Also indicates worsening speech, now communicating by writing in note pad. Pt. is known to out-patient neuromuscular clinic, recently had an out-patient MBSS on 4/18/22 with recs for puree w/mildly thick liquids and long term means for nutrition/hydration.   Pt's son reports worsening dysphagia x1 week ago she was eating soft, cut up meals now food is ground/puree consistency. Also indicates worsening speech, now communicating by writing in note pad.

## 2022-09-19 NOTE — REVIEW OF SYSTEMS
[Fatigue] : fatigue [Poor Appetite] : poor appetite [Recent Wt Loss (___ Lbs)] : ~T recent [unfilled] lb weight loss [Sputum] : sputum [Dyspnea] : dyspnea [Orthopnea] : orthopnea [Negative] : Endocrine [Cough] : no cough

## 2022-09-19 NOTE — CONSULT NOTE ADULT - SUBJECTIVE AND OBJECTIVE BOX
NEUROLOGY CONSULT    72-year-old female with PMHx of ALS, DM, HTN, HLD, recent covid presents with shortness of breath x1 day. CTA shows RLL pulmonary embolism. Patient has a h/o ALS diagnosed 2021(symptoms started 2019). Patient used to walk at baseline untill 2 months ago when she was diagnosed with COVID and felt weak after. Wheelchair and bedbound since covid 2 months ago. Patient was using Bipap untill 2 months ago but not after as they were waiting to see a pulmonologist. No sob at baseline. Eating chopped/pureed food at baseline, no episodes of choking but drooling +ve.  Patient was recently seen by Dr. Riggs 9/9 in clinic, started on radicava and recommended GI referral for PEG eval.     HPI:  72-year-old female with PMHx of ALS, DM, HTN, HLD, recent covid presents with shortness of breath x1 day.  Patient reportedly 86% on room air at home. No other complaints except for LE swelling and pain at the buttocks. Denies fevers, chills, chest pain, cough, abdominal pain, nausea, vomiting, rash. Per d/w daughter, ALS diagnosed 2020. Has recently begun to progress rapidly. Patient was able to ambulate one week prior to presentation using rolling walker. Now patient has been bed bound, too weak to ambulate. Pt also with worsening dysphagia per daughter. Family requesting social work/ case management assistance in helping coordinate safe discharge and equipment to help take care of patient at home.     in the ED,pt's VS T(C): 36.4 -36.9  HR:  (67 - 98)  BP: (106/71 - 135/72)  RR:  (17 - 18)  SpO2:  (97% - 99%); now on NRB   labs done showed no wbc, anemia 10.2 Hb, INR 0.96, Na 149, bicarb 36, VBG with hypercapnia, troponin 0.08  CT chest with small RLL pulmonary embolus w/o RHS and bibasilar patchy consolidative opacities   pt received lovenox, rocephin azithromycin  pt is admitted for workup/management PE/ worsening ALS   (19 Sep 2022 06:25)     MEDICATIONS  Home Medications:  atorvastatin 10 mg oral tablet: 1 tab(s) orally once a day (19 Sep 2022 07:39)  losartan 25 mg oral tablet: 1 tab(s) orally once a day (19 Sep 2022 07:39)  metFORMIN 500 mg oral tablet: 1 tab(s) orally 2 times a day (19 Sep 2022 07:39)  riluzole 50 mg oral tablet: 1 tab(s) orally every 12 hours (19 Sep 2022 07:39)    MEDICATIONS  (STANDING):  ampicillin/sulbactam  IVPB      ampicillin/sulbactam  IVPB 1.5 Gram(s) IV Intermittent once  ampicillin/sulbactam  IVPB 1.5 Gram(s) IV Intermittent every 6 hours  atorvastatin Oral Tab/Cap - Peds 10 milliGRAM(s) Oral daily  chlorhexidine 2% Cloths 1 Application(s) Topical <User Schedule>  dextrose 5%. 1000 milliLiter(s) (50 mL/Hr) IV Continuous <Continuous>  dextrose 5%. 1000 milliLiter(s) (100 mL/Hr) IV Continuous <Continuous>  dextrose 50% Injectable 25 Gram(s) IV Push once  dextrose 50% Injectable 12.5 Gram(s) IV Push once  dextrose 50% Injectable 25 Gram(s) IV Push once  enoxaparin Injectable 40 milliGRAM(s) SubCutaneous every 12 hours  glucagon  Injectable 1 milliGRAM(s) IntraMuscular once  insulin lispro (ADMELOG) corrective regimen sliding scale   SubCutaneous three times a day before meals  losartan 25 milliGRAM(s) Oral daily  riluzole 50 milliGRAM(s) Oral two times a day    MEDICATIONS  (PRN):  acetaminophen     Tablet .. 650 milliGRAM(s) Oral every 6 hours PRN Temp greater or equal to 38C (100.4F), Mild Pain (1 - 3)  aluminum hydroxide/magnesium hydroxide/simethicone Suspension 30 milliLiter(s) Oral every 4 hours PRN Dyspepsia  dextrose Oral Gel 15 Gram(s) Oral once PRN Blood Glucose LESS THAN 70 milliGRAM(s)/deciliter  melatonin 3 milliGRAM(s) Oral at bedtime PRN Insomnia  ondansetron Injectable 4 milliGRAM(s) IV Push every 8 hours PRN Nausea and/or Vomiting      FAMILY HISTORY:    SOCIAL HISTORY: negative for tobacco, alcohol, or ilicit drug use.    Allergies    No Known Allergies    Intolerances        GEN: NAD, pleasant, cooperative    NEURO:   MENTAL STATUS: Non verbal, but understands and communicates in writing  LANG/SPEECH: Non verbal, but understands and communicates in writing  CRANIAL NERVES:  II: Pupils equal and reactive, no RAPD, normal visual field and fundus  III, IV, VI: EOM intact, no gaze preference or deviation  V: normal  VII: no facial asymmetry  VIII: normal hearing to speech  XII: Tongue fassiculation and atrophy seen  MOTOR: decreased bilateral upper and lower ext  SENSORY: Normal to touch, temperature & pin prick in all extremiteis  COORD: Normal finger to nose and heel to shin, no tremor, no dysmetria      LABS:                        10.0   7.85  )-----------( 125      ( 19 Sep 2022 12:06 )             30.5     09-19    149<H>  |  104  |  27<H>  ----------------------------<  86  3.1<L>   |  34<H>  |  0.7    Ca    9.0      19 Sep 2022 12:06  Mg     1.8     09-19    TPro  5.4<L>  /  Alb  4.0  /  TBili  0.9  /  DBili  x   /  AST  41  /  ALT  30  /  AlkPhos  95  09-19    Hemoglobin A1C:   Vitamin B12   PT/INR - ( 18 Sep 2022 23:10 )   PT: 11.10 sec;   INR: 0.96 ratio         PTT - ( 18 Sep 2022 23:10 )  PTT:29.9 sec  CAPILLARY BLOOD GLUCOSE      POCT Blood Glucose.: 86 mg/dL (19 Sep 2022 15:04)              Microbiology:      RADIOLOGY, EKG AND ADDITIONAL TESTS: Reviewed.

## 2022-09-19 NOTE — CONSULT NOTE ADULT - NS ATTEND AMEND GEN_ALL_CORE FT
Agree with above, d/w family, will discuss ongoing GOC  ______________  Sony Vaughan MD  Palliative Medicine  [facility]  [phone]

## 2022-09-19 NOTE — SWALLOW BEDSIDE ASSESSMENT ADULT - NS SPL SWALLOW CLINIC TRIAL FT
+ overt s/s of aspiration w/thin liquids (non productive cough). Mild oral dysphagia with ground. + tolerance for puree and mildly thick liquids without overt s/s of penetration/ aspiration. Mild oral dysphagia with ground. + tolerance for puree and mildly thick liquids without overt s/s of penetration/ aspiration.

## 2022-09-19 NOTE — PHYSICAL EXAM
[Normal Oropharynx] : normal oropharynx [II] : Mallampati Class: II [Normal Appearance] : normal appearance [No Neck Mass] : no neck mass [Normal Rate/Rhythm] : normal rate/rhythm [Normal S1, S2] : normal s1, s2 [No Murmurs] : no murmurs [No Resp Distress] : no resp distress [Clear to Auscultation Bilaterally] : clear to auscultation bilaterally [No Abnormalities] : no abnormalities [Benign] : benign [Normal Gait] : normal gait [No Clubbing] : no clubbing [No Cyanosis] : no cyanosis [No Edema] : no edema [FROM] : FROM [Normal Color/ Pigmentation] : normal color/ pigmentation [Oriented x3] : oriented x3 [Normal Affect] : normal affect [TextBox_2] : wheelchair bound, weak and frail [TextBox_68] : decreased breath sounds  [TextBox_132] : severe weakness

## 2022-09-19 NOTE — CONSULT NOTE ADULT - SUBJECTIVE AND OBJECTIVE BOX
HPI:    72-year-old female with PMHx of ALS, DM, HTN, HLD, recent covid presents with shortness of breath x1 day.  Patient reportedly 86% on room air at home. No other complaints except for LE swelling and pain at the buttocks. Denies fevers, chills, chest pain, cough, abdominal pain, nausea, vomiting, rash.  Per d/w daughter, ALS diagnosed 2020. Has recently begun to progress rapidly. Patient was able to ambulate one week prior to presentation using rolling walker. Now patient has been bed bound, too weak to ambulate. Pt also with worsening dysphagia per daughter. Family requesting social work/ case management assistance in helping coordinate safe discharge and equipment to help take care of patient at home.     in the ED,pt's VS T(C): 36.4 -36.9  HR:  (67 - 98)  BP: (106/71 - 135/72)  RR:  (17 - 18)  SpO2:  (97% - 99%); now on NRB   labs done showed no wbc, anemia 10.2 Hb, INR 0.96, Na 149, bicarb 36, VBG with hypercapnia, troponin 0.08  CT chest with small RLL pulmonary embolus w/o RHS and bibasilar patchy consolidative opacities   pt received lovenox, rocephin azithromycin  pt is admitted for workup/management PE/ worsening ALS   (19 Sep 2022 06:25)    PERTINENT PM/SXH:   Amyotrophic lateral sclerosis (ALS)      FAMILY HISTORY:    ITEMS NOT CHECKED ARE NOT PRESENT    SOCIAL HISTORY:   Significant other/partner[ X- Brijesh ]  Children[ X- Kayla]  Scientology/Spirituality:  Substance hx:  [ ]   Tobacco hx:  [ ]   Alcohol hx: [ ]   Living Situation: [ X]Home  [ ]Long term care  [ ]Rehab [ ]Other  Home Services: [ ] HHA [ ] Marvin RN [ ] Hospice  Occupation:  Home Opioid hx:  [ ] Y [ ] N [ ] I-Stop Reference No:    ADVANCE DIRECTIVES:     MOLST  [ ]  Living Will  [ ]   DECISION MAKER(s):  [ ] Health Care Proxy(s)  [ X] Surrogate(s)  [ ] Guardian           Name(s): Phone Number(s): Spouse, children     BASELINE (I)ADL(s) (prior to admission):  [place]: [ ]Total  [ ] Moderate [X ]Dependent  Palliative Performance Status Version 2:         30%    http://npcrc.org/files/news/palliative_performance_scale_ppsv2.pdf    Allergies    No Known Allergies    Intolerances    MEDICATIONS  (STANDING):  atorvastatin Oral Tab/Cap - Peds 10 milliGRAM(s) Oral daily  chlorhexidine 2% Cloths 1 Application(s) Topical <User Schedule>  dextrose 5%. 1000 milliLiter(s) (50 mL/Hr) IV Continuous <Continuous>  dextrose 5%. 1000 milliLiter(s) (100 mL/Hr) IV Continuous <Continuous>  dextrose 50% Injectable 25 Gram(s) IV Push once  dextrose 50% Injectable 12.5 Gram(s) IV Push once  dextrose 50% Injectable 25 Gram(s) IV Push once  enoxaparin Injectable 40 milliGRAM(s) SubCutaneous every 12 hours  glucagon  Injectable 1 milliGRAM(s) IntraMuscular once  insulin lispro (ADMELOG) corrective regimen sliding scale   SubCutaneous three times a day before meals  losartan 25 milliGRAM(s) Oral daily  riluzole 50 milliGRAM(s) Oral two times a day    MEDICATIONS  (PRN):  acetaminophen     Tablet .. 650 milliGRAM(s) Oral every 6 hours PRN Temp greater or equal to 38C (100.4F), Mild Pain (1 - 3)  aluminum hydroxide/magnesium hydroxide/simethicone Suspension 30 milliLiter(s) Oral every 4 hours PRN Dyspepsia  dextrose Oral Gel 15 Gram(s) Oral once PRN Blood Glucose LESS THAN 70 milliGRAM(s)/deciliter  melatonin 3 milliGRAM(s) Oral at bedtime PRN Insomnia  ondansetron Injectable 4 milliGRAM(s) IV Push every 8 hours PRN Nausea and/or Vomiting    PRESENT SYMPTOMS: [ ]Unable to obtain due to poor mentation   Source if other than patient:  [ ]Family   [ ]Team     Pain: [ ]yes [ ]no  QOL impact -   Location -                    Aggravating factors -  Quality -  Radiation -  Timing-  Severity (0-10 scale):  Minimal acceptable level (0-10 scale):     PAIN AD Score:     http://geriatrictoolkit.[place].[email or web address]/cog/painad.pdf (press ctrl +  left click to view)    Dyspnea:                           [ ]Mild [ ]Moderate [ ]Severe  Anxiety:                             [ ]Mild [ ]Moderate [ ]Severe  Fatigue:                             [ ]Mild [ ]Moderate [ ]Severe  Nausea:                             [ ]Mild [ ]Moderate [ ]Severe  Loss of appetite:              [ ]Mild [ ]Moderate [ ]Severe  Constipation:                    [ ]Mild [ ]Moderate [ ]Severe    Other Symptoms:  [ ]All other review of systems negative     Palliative Performance Status Version 2:      30   %    [email or web address].org/files/news/palliative_performance_scale_[email or web address].pdf    PHYSICAL EXAM:  Vital Signs Last 24 Hrs  T(C): 36.4 (18 Sep 2022 22:26), Max: 36.9 (18 Sep 2022 20:29)  T(F): 97.6 (18 Sep 2022 22:26), Max: 98.4 (18 Sep 2022 20:29)  HR: 87 (19 Sep 2022 06:00) (67 - 98)  BP: 127/98 (19 Sep 2022 06:00) (106/71 - 135/72)  BP(mean): --  RR: 18 (19 Sep 2022 06:00) (17 - 18)  SpO2: 97% (19 Sep 2022 06:00) (97% - 99%)    GENERAL:  [ ]Alert  [ ]Oriented x   [ ]Lethargic  [ ]Cachexia  [ ]Unarousable  [ ]Verbal  [ ]Non-Verbal  Behavioral:   [ ] Anxiety  [ ] Delirium [ ] Agitation [ ] Other  HEENT:  [ ]Normal   [ ]Dry mouth   [ ]ET Tube/Trach  [ ]Oral lesions  PULMONARY:   [ ]Clear [ ]Tachypnea  [ ]Audible excessive secretions   [ ]Rhonchi        [ ]Right [ ]Left [ ]Bilateral  [ ]Crackles        [ ]Right [ ]Left [ ]Bilateral  [ ]Wheezing     [ ]Right [ ]Left [ ]Bilateral  [ ]Diminished breath sounds [ ]right [ ]left [ ]bilateral  CARDIOVASCULAR:    [ ]Regular [ ]Irregular [ ]Tachy  [ ]Hunter [ ]Murmur [ ]Other  GASTROINTESTINAL:  [ ]Soft  [ ]Distended   [ ]+BS  [ ]Non tender [ ]Tender  [ ]PEG [ ]OGT/ NGT  Last BM:   GENITOURINARY:  [ ]Normal [ ] Incontinent   [ ]Oliguria/Anuria   [ ]Damon  MUSCULOSKELETAL:   [ ]Normal   [ ]Weakness  [ ]Bed/Wheelchair bound [ ]Edema  NEUROLOGIC:   [ ]No focal deficits  [ ]Cognitive impairment  [ ]Dysphagia [ ]Dysarthria [ ]Paresis [ ]Other   SKIN:   [ ]Normal    [ ]Rash  [ ]Pressure ulcer(s)       Present on admission [ ]y [ ]n      LABS:                        10.2   8.04  )-----------( 125      ( 18 Sep 2022 23:10 )             30.9   09-18    149<H>  |  104  |  37<H>  ----------------------------<  93  5.0   |  36<H>  |  0.8    Ca    9.0      18 Sep 2022 23:10    TPro  5.7<L>  /  Alb  4.0  /  TBili  1.0  /  DBili  x   /  AST  56<H>  /  ALT  32  /  AlkPhos  94  09-18  PT/INR - ( 18 Sep 2022 23:10 )   PT: 11.10 sec;   INR: 0.96 ratio         PTT - ( 18 Sep 2022 23:10 )  PTT:29.9 sec      RADIOLOGY & ADDITIONAL STUDIES:    < from: CT Angio Chest PE Protocol w/ IV Cont (09.19.22 @ 02:26) >  IMPRESSION:      1.  Small right lower lobe pulmonary emboli. No evidence of right heart   strain.  2.  Left greater than right patchy consolidative opacities may represent   atelectasis or pneumonia in the appropriate clinical setting.    --- End of Report ---    ***Please see the addendum at the top of this report. It may contain   additional important information or changes.****    < end of copied text >      REFERRALS:   [ ]Chaplaincy  [ ]Hospice  [ ]Child Life  [ ]Social Work  [ ]Case management [ ]Holistic Therapy     Goals of Care Document:      HPI: 72-year-old female with PMHx of ALS, DM, HTN, HLD, recent covid presents with shortness of breath x1 day.  Patient reportedly 86% on room air at home. No other complaints except for LE swelling and pain at the buttocks. Denies fevers, chills, chest pain, cough, abdominal pain, nausea, vomiting, rash.  Per d/w daughter, ALS diagnosed 2020. Has recently begun to progress rapidly. Patient was able to ambulate one week prior to presentation using rolling walker. Now patient has been bed bound, too weak to ambulate. Pt also with worsening dysphagia per daughter. Family requesting social work/ case management assistance in helping coordinate safe discharge and equipment to help take care of patient at home.  (19 Sep 2022 06:25)    PERTINENT PM/SXH:   Amyotrophic lateral sclerosis (ALS)      FAMILY HISTORY: No known cardiovascular family history    ITEMS NOT CHECKED ARE NOT PRESENT    SOCIAL HISTORY:   Significant other/partner[ X- Brijesh ]  Children[ X- Kayla]  Scientology/Spirituality:  Substance hx:  [ ]   Tobacco hx:  [ ]   Alcohol hx: [ ]   Living Situation: [ X]Home  [ ]Long term care  [ ]Rehab [ ]Other  Home Services: [ ] HHA [ ] Visting RN [ ] Hospice  Occupation:  Home Opioid hx:  [ ] Y [ ] N [ ] I-Stop Reference No:    ADVANCE DIRECTIVES:     MOLST  [ ]  Living Will  [ ]   DECISION MAKER(s):  [ ] Health Care Proxy(s)  [ X] Surrogate(s)  [ ] Guardian           Name(s): Phone Number(s): Spouse, children     BASELINE (I)ADL(s) (prior to admission):  [place]: [ ]Total  [ ] Moderate [X ]Dependent  Palliative Performance Status Version 2:         30%    http://npcrc.org/files/news/palliative_performance_scale_ppsv2.pdf    Allergies    No Known Allergies    Intolerances    MEDICATIONS  (STANDING):  atorvastatin Oral Tab/Cap - Peds 10 milliGRAM(s) Oral daily  chlorhexidine 2% Cloths 1 Application(s) Topical <User Schedule>  dextrose 5%. 1000 milliLiter(s) (50 mL/Hr) IV Continuous <Continuous>  dextrose 5%. 1000 milliLiter(s) (100 mL/Hr) IV Continuous <Continuous>  dextrose 50% Injectable 25 Gram(s) IV Push once  dextrose 50% Injectable 12.5 Gram(s) IV Push once  dextrose 50% Injectable 25 Gram(s) IV Push once  enoxaparin Injectable 40 milliGRAM(s) SubCutaneous every 12 hours  glucagon  Injectable 1 milliGRAM(s) IntraMuscular once  insulin lispro (ADMELOG) corrective regimen sliding scale   SubCutaneous three times a day before meals  losartan 25 milliGRAM(s) Oral daily  riluzole 50 milliGRAM(s) Oral two times a day    MEDICATIONS  (PRN):  acetaminophen     Tablet .. 650 milliGRAM(s) Oral every 6 hours PRN Temp greater or equal to 38C (100.4F), Mild Pain (1 - 3)  aluminum hydroxide/magnesium hydroxide/simethicone Suspension 30 milliLiter(s) Oral every 4 hours PRN Dyspepsia  dextrose Oral Gel 15 Gram(s) Oral once PRN Blood Glucose LESS THAN 70 milliGRAM(s)/deciliter  melatonin 3 milliGRAM(s) Oral at bedtime PRN Insomnia  ondansetron Injectable 4 milliGRAM(s) IV Push every 8 hours PRN Nausea and/or Vomiting    PRESENT SYMPTOMS: [ ]Unable to obtain due to poor mentation   Source if other than patient:  [ ]Family   [ ]Team     Pain: [ ]yes [ ]no  QOL impact -   Location - sacral/coccygeal area                    Aggravating factors -  Quality -  Radiation -  Timing-  Severity (0-10 scale): 10/10 at worst  Minimal acceptable level (0-10 scale):  5/10    PAIN AD Score:     [email or web address]/[email or web address] (press ctrl +  left click to view)    Dyspnea:                           [ ]Mild [ ]Moderate [ ]Severe  Anxiety:                             [ ]Mild [ ]Moderate [ ]Severe  Fatigue:                             [ ]Mild [ ]Moderate [ ]Severe  Nausea:                             [ ]Mild [ ]Moderate [ ]Severe  Loss of appetite:              [ ]Mild [ ]Moderate [ ]Severe  Constipation:                    [ ]Mild [ ]Moderate [ ]Severe    Other Symptoms:  [ ]All other review of systems negative     Palliative Performance Status Version 2:      30   %    http://npcrc.org/files/news/palliative_performance_scale_ppsv2.pdf    PHYSICAL EXAM:  Vital Signs Last 24 Hrs  T(C): 36.8 (19 Sep 2022 16:51), Max: 36.9 (18 Sep 2022 20:29)  T(F): 98.2 (19 Sep 2022 16:51), Max: 98.4 (18 Sep 2022 20:29)  HR: 103 (19 Sep 2022 16:51) (67 - 111)  BP: 90/65 (19 Sep 2022 16:51) (90/65 - 175/91)  BP(mean): 116 (19 Sep 2022 12:49) (116 - 116)  RR: 178 (19 Sep 2022 16:51) (17 - 178)  SpO2: 100% (19 Sep 2022 16:51) (93% - 100%)    Parameters below as of 19 Sep 2022 16:51  Patient On (Oxygen Delivery Method): nasal cannula     I&O's Summary    GENERAL:  [ ]Alert  [ ]Oriented x   [ ]Lethargic  [ ]Cachexia  [ ]Unarousable  [ ]Verbal  [ X]Non-Verbal largely  Behavioral:   [ ] Anxiety  [ ] Delirium [ ] Agitation [X ] Calm [ ] Other  HEENT:  [ X]Normal   [ ]Dry mouth   [ ]ET Tube/Trach  [ ]Oral lesions  PULMONARY:   [ ]Clear [ ]Tachypnea  [ ]Audible excessive secretions [X ] No labored breathing  [ ]Rhonchi        [ ]Right [ ]Left [ ]Bilateral  [ ]Crackles        [ ]Right [ ]Left [ ]Bilateral  [ ]Wheezing     [ ]Right [ ]Left [ ]Bilateral  [ ]Diminished breath sounds [ ]right [ ]left [ ]bilateral  CARDIOVASCULAR:    [ ]Regular [ ]Irregular [ ]Tachy  [ ]Hunter [ ]Murmur [ ]Other  GASTROINTESTINAL:  [ ]Soft  [ ]Distended  [X ] Not distended [ ]+BS  [ ]Non tender [ ]Tender  [ ]PEG [ ]OGT/ NGT  Last BM:   GENITOURINARY:  [ X]Normal [ ] Incontinent   [ ]Oliguria/Anuria   [ ]Damon  MUSCULOSKELETAL:   [ ]Normal   [ ]Weakness  [ X]Bed/Wheelchair bound [ ]Edema  NEUROLOGIC:   [ X]No focal deficits  [ ]Cognitive impairment  [ ]Dysphagia [ ]Dysarthria [ ]Paresis [ ]Other   SKIN:   [ ]Normal   [ X] Nonjaundiced [ ]Rash  [ ]Pressure ulcer(s)       Present on admission [ ]y [ ]n    LABS: reviewed                        10.2   8.04  )-----------( 125      ( 18 Sep 2022 23:10 )             30.9   09-18    149<H>  |  104  |  37<H>  ----------------------------<  93  5.0   |  36<H>  |  0.8    Ca    9.0      18 Sep 2022 23:10    TPro  5.7<L>  /  Alb  4.0  /  TBili  1.0  /  DBili  x   /  AST  56<H>  /  ALT  32  /  AlkPhos  94  09-18  PT/INR - ( 18 Sep 2022 23:10 )   PT: 11.10 sec;   INR: 0.96 ratio         PTT - ( 18 Sep 2022 23:10 )  PTT:29.9 sec      RADIOLOGY & ADDITIONAL STUDIES:    < from: CT Angio Chest PE Protocol w/ IV Cont (09.19.22 @ 02:26) >  IMPRESSION:      1.  Small right lower lobe pulmonary emboli. No evidence of right heart   strain.  2.  Left greater than right patchy consolidative opacities may represent   atelectasis or pneumonia in the appropriate clinical setting.    --- End of Report ---    ***Please see the addendum at the top of this report. It may contain   additional important information or changes.****    < end of copied text >      REFERRALS:   [ ]Chaplaincy  [ ]Hospice  [ ]Child Life  [ ]Social Work  [ ]Case management [ ]Holistic Therapy     Goals of Care Document:

## 2022-09-19 NOTE — HISTORY OF PRESENT ILLNESS
[TextBox_4] : 72-year-old female with any atrophic lateral sclerosis with recent significant clinical worsening, now essentially wheelchair-bound, has issues with food intake and has been recommended to get a PEG tube placed, was following previously with [place] neuromuscular/pulmonary department and was on noninvasive positive pressure ventilation as needed and during sleep for hypoventilation and dyspnea, as well as a CoughAssist device due to severe neuromuscular weakness.  She is currently short of breath, more so when she lays down to sleep.  Her speech and swallow has been greatly affected by her disease.  She is also unable to clear her secretions on her own without a vest/CoughAssist device.  She has been following with neurology regularly.  From a pulmonary standpoint we will reorder trilogy device and CoughAssist device.

## 2022-09-19 NOTE — H&P ADULT - NSHPLABSRESULTS_GEN_ALL_CORE
LABS: Personally reviewed labs, imaging, and ECG                          10.2   8.04  )-----------( 125      ( 18 Sep 2022 23:10 )             30.9       09-18    149<H>  |  104  |  37<H>  ----------------------------<  93  5.0   |  36<H>  |  0.8    Ca    9.0      18 Sep 2022 23:10    TPro  5.7<L>  /  Alb  4.0  /  TBili  1.0  /  DBili  x   /  AST  56<H>  /  ALT  32  /  AlkPhos  94  09-18       LIVER FUNCTIONS - ( 18 Sep 2022 23:10 )  Alb: 4.0 g/dL / Pro: 5.7 g/dL / ALK PHOS: 94 U/L / ALT: 32 U/L / AST: 56 U/L / GGT: x                        PT/INR - ( 18 Sep 2022 23:10 )   PT: 11.10 sec;   INR: 0.96 ratio         PTT - ( 18 Sep 2022 23:10 )  PTT:29.9 sec    Lactate Trend  09-18 @ 23:10 Lactate:1.3       CARDIAC MARKERS ( 18 Sep 2022 23:10 )  x     / 0.08 ng/mL / x     / x     / x            CAPILLARY BLOOD GLUCOSE            RADIOLOGY & ADDITIONAL TESTS:

## 2022-09-19 NOTE — CONSULT NOTE ADULT - SUBJECTIVE AND OBJECTIVE BOX
Patient is a 72y old  Female who presents with a chief complaint of sob (19 Sep 2022 06:25)      HPI:  72-year-old female with PMHx of ALS, DM, HTN, HLD, recent covid presents with shortness of breath x1 day.  Patient reportedly 86% on room air at home. No other complaints except for LE swelling and pain at the buttocks. Denies fevers, chills, chest pain, cough, abdominal pain, nausea, vomiting, rash.  Per d/w daughter, ALS diagnosed 2020. Has recently begun to progress rapidly. Patient was able to ambulate one week prior to presentation using rolling walker. Now patient has been bed bound, too weak to ambulate. Pt also with worsening dysphagia per daughter. Family requesting social work/ case management assistance in helping coordinate safe discharge and equipment to help take care of patient at home.     in the ED,pt's VS T(C): 36.4 -36.9  HR:  (67 - 98)  BP: (106/71 - 135/72)  RR:  (17 - 18)  SpO2:  (97% - 99%); now on NRB   labs done showed no wbc, anemia 10.2 Hb, INR 0.96, Na 149, bicarb 36, VBG with hypercapnia, troponin 0.08  CT chest with small RLL pulmonary embolus w/o RHS and bibasilar patchy consolidative opacities   pt received lovenox, rocephin azithromycin  pt is admitted for workup/management PE/ worsening ALS   (19 Sep 2022 06:25)      PAST MEDICAL & SURGICAL HISTORY:  Amyotrophic lateral sclerosis (ALS)          SOCIAL HX:   Smoking    NO                     ETOH                            Other    FAMILY HISTORY:  :  No known cardiovacular family hisotry     Review Of Systems:     All ROS are negative except per HPI       Allergies    No Known Allergies    Intolerances          PHYSICAL EXAM    ICU Vital Signs Last 24 Hrs  T(C): 36.4 (18 Sep 2022 22:26), Max: 36.9 (18 Sep 2022 20:29)  T(F): 97.6 (18 Sep 2022 22:26), Max: 98.4 (18 Sep 2022 20:29)  HR: 87 (19 Sep 2022 06:00) (67 - 98)  BP: 127/98 (19 Sep 2022 06:00) (106/71 - 135/72)  BP(mean): --  ABP: --  ABP(mean): --  RR: 18 (19 Sep 2022 06:00) (17 - 18)  SpO2: 97% (19 Sep 2022 06:00) (97% - 99%)    O2 Parameters below as of 19 Sep 2022 06:00  Patient On (Oxygen Delivery Method): mask, nonrebreather  O2 Flow (L/min): 15          CONSTITUTIONAL:  Ill appearing in NAD    ENT:   Airway patent,   Mouth with normal mucosa.         CARDIAC:   Normal rate,   Regular rhythm.        RESPIRATORY:   No wheezing  Bilateral BS   Not tachypneic,  No use of accessory muscles    GASTROINTESTINAL:  Abdomen soft,   Non-tender,   No guarding,   + BS      NEUROLOGICAL:   Alert   Follows simple commands     SKIN:   Skin normal color for race,   No evidence of rash.                    LABS:                          10.2   8.04  )-----------( 125      ( 18 Sep 2022 23:10 )             30.9                                               09-18    149<H>  |  104  |  37<H>  ----------------------------<  93  5.0   |  36<H>  |  0.8    Ca    9.0      18 Sep 2022 23:10    TPro  5.7<L>  /  Alb  4.0  /  TBili  1.0  /  DBili  x   /  AST  56<H>  /  ALT  32  /  AlkPhos  94  09-18      PT/INR - ( 18 Sep 2022 23:10 )   PT: 11.10 sec;   INR: 0.96 ratio         PTT - ( 18 Sep 2022 23:10 )  PTT:29.9 sec                                           CARDIAC MARKERS ( 18 Sep 2022 23:10 )  x     / 0.08 ng/mL / x     / x     / x                                                LIVER FUNCTIONS - ( 18 Sep 2022 23:10 )  Alb: 4.0 g/dL / Pro: 5.7 g/dL / ALK PHOS: 94 U/L / ALT: 32 U/L / AST: 56 U/L / GGT: x                                                                                                                                       X-Rays reviewed                                                                                     ECHO       MEDICATIONS  (STANDING):  atorvastatin Oral Tab/Cap - Peds 10 milliGRAM(s) Oral daily  chlorhexidine 2% Cloths 1 Application(s) Topical <User Schedule>  dextrose 5%. 1000 milliLiter(s) (50 mL/Hr) IV Continuous <Continuous>  dextrose 5%. 1000 milliLiter(s) (100 mL/Hr) IV Continuous <Continuous>  dextrose 50% Injectable 25 Gram(s) IV Push once  dextrose 50% Injectable 12.5 Gram(s) IV Push once  dextrose 50% Injectable 25 Gram(s) IV Push once  enoxaparin Injectable 40 milliGRAM(s) SubCutaneous every 12 hours  glucagon  Injectable 1 milliGRAM(s) IntraMuscular once  insulin lispro (ADMELOG) corrective regimen sliding scale   SubCutaneous three times a day before meals  losartan 25 milliGRAM(s) Oral daily  riluzole 50 milliGRAM(s) Oral two times a day    MEDICATIONS  (PRN):  acetaminophen     Tablet .. 650 milliGRAM(s) Oral every 6 hours PRN Temp greater or equal to 38C (100.4F), Mild Pain (1 - 3)  aluminum hydroxide/magnesium hydroxide/simethicone Suspension 30 milliLiter(s) Oral every 4 hours PRN Dyspepsia  dextrose Oral Gel 15 Gram(s) Oral once PRN Blood Glucose LESS THAN 70 milliGRAM(s)/deciliter  melatonin 3 milliGRAM(s) Oral at bedtime PRN Insomnia  ondansetron Injectable 4 milliGRAM(s) IV Push every 8 hours PRN Nausea and/or Vomiting

## 2022-09-19 NOTE — SWALLOW BEDSIDE ASSESSMENT ADULT - SLP PERTINENT HISTORY OF CURRENT PROBLEM
72 year old female with PMH of ALS, HTN, HLD, DM an d recent COVID. Presents for acute SOB x1 day. Per d/w daughter, ALS diagnosed 2020. Has recently begun to progress rapidly. Here with acute hypoxic respiratory failure, suspected chronic hypercapnia secondary to ALS. CT chest revealed small Rt lower lobe pulmonary emboli. Left greater than right consolidative opacities may represent atelectasis or pneumonia.

## 2022-09-19 NOTE — SWALLOW BEDSIDE ASSESSMENT ADULT - ORAL PHASE
w/ minced & moist/Decreased anterior-posterior movement of the bolus/Delayed oral transit time/Lingual stasis

## 2022-09-19 NOTE — H&P ADULT - NSHPPHYSICALEXAM_GEN_ALL_CORE
PHYSICAL EXAM:  GENERAL: NAD, Alert, unable to ascertain orientation d/t medical condition  HEAD:  Atraumatic, Normocephalic; Nonrebreather mask  EYES: conjunctiva and sclera white  NECK: Supple, No JVD  CHEST/LUNG: cta bl; no significant bs auscultated  HEART: tachy; ?murmur  ABDOMEN: Soft, Nontender, Nondistended; decreased bs  EXTREMITIES: diminished pulses bilateral le   NEUROLOGY: able to follow commands, move LE but not against gravity; EOMI; pupils sluggish

## 2022-09-19 NOTE — CONSULT NOTE ADULT - ASSESSMENT
72-year-old female with PMHx of ALS, DM, HTN, HLD, recent covid presents with shortness of breath x1 day. CTA shows RLL pulmonary embolism. Patient has a h/o ALS diagnosed 2021(symptoms started 2019). Patient used to walk at baseline untill 2 months ago when she was diagnosed with COVID and felt weak after. Wheelchair and bedbound since covid 2 months ago. Patient was using Bipap untill 2 months ago but not after as they were waiting to see a pulmonologist. No sob at baseline. Eating chopped/pureed food at baseline, decreased po intake, no episodes of choking but drooling +ve.  Patient was recently seen by Dr. Riggs 9/9 in clinic, started on radicava and recommended GI referral for PEG eval.     #ALS  progressive disease  wheelchair bound from last 2 months(since covid)  on riluzole and radicava  drooling +ve    Recommendations:  - Scopolamine patch or atropine oral drops for increased salivation  - continue with radicava and ruluzole  - GI eval for PEG tube placement  - [facility]     Thank you for the courtesy of this consult.      72-year-old female with PMHx of ALS, DM, HTN, HLD, recent covid presents with shortness of breath x1 day. CTA shows RLL pulmonary embolism. Patient has a h/o ALS diagnosed 2021(symptoms started 2019). Patient used to walk at baseline untill 2 months ago when she was diagnosed with COVID and felt weak after. Wheelchair and bedbound since covid 2 months ago. Patient was using Bipap untill 2 months ago but not after as they were waiting to see a pulmonologist. No sob at baseline. Eating chopped/pureed food at baseline, decreased po intake, no episodes of choking but drooling +ve.  Patient was recently seen by Dr. Riggs 9/9 in clinic, started on radicava and recommended GI referral for PEG eval.     #ALS  progressive disease  wheelchair bound from last 2 months(since covid)  on riluzole and radicava  excessive drooling     Recommendations:  - Scopolamine patch or 1% atropine eye drops (given orally) for increased salivation  - continue with radicava and riluzole  - GI eval for PEG tube placement  - [facility]     Thank you for the courtesy of this consult.

## 2022-09-19 NOTE — SWALLOW BEDSIDE ASSESSMENT ADULT - SWALLOW EVAL: DIAGNOSIS
+ overt s/s of aspiration w/thin liquids. Mild oral dysphagia with ground. + tolerance for puree and mildly thick liquids without overt s/s of penetration/ aspiration. Mild oral dysphagia with ground solids + tolerance for puree and mildly thick liquids without overt s/s of penetration/ aspiration. Thin liquids trials are not warranted 2' known h/o dysphagia as seen in out-out patient MBSS. Pt. wishes to continue with a PO diet, however PEG is recommended at this time.

## 2022-09-19 NOTE — CONSULT NOTE ADULT - PROBLEM SELECTOR RECOMMENDATION 5
- d/w patient and son  - HCP copy in chart  - introduced palliative care  - will follow  ______________  Sony Vaughan MD  Palliative Medicine  [facility] [place] [facility]   of Geriatric and Palliative Medicine  [phone]

## 2022-09-20 PROBLEM — G12.21 AMYOTROPHIC LATERAL SCLEROSIS: Chronic | Status: ACTIVE | Noted: 2022-01-01

## 2022-09-20 NOTE — PROGRESS NOTE ADULT - SUBJECTIVE AND OBJECTIVE BOX
HPI:    72-year-old female with PMHx of ALS, DM, HTN, HLD, recent covid presents with shortness of breath x1 day.  Patient reportedly 86% on room air at home. No other complaints except for LE swelling and pain at the buttocks. Denies fevers, chills, chest pain, cough, abdominal pain, nausea, vomiting, rash.  Per d/w daughter, ALS diagnosed 2020. Has recently begun to progress rapidly. Patient was able to ambulate one week prior to presentation using rolling walker. Now patient has been bed bound, too weak to ambulate. Pt also with worsening dysphagia per daughter. Family requesting social work/ case management assistance in helping coordinate safe discharge and equipment to help take care of patient at home.     in the ED,pt's VS T(C): 36.4 -36.9  HR:  (67 - 98)  BP: (106/71 - 135/72)  RR:  (17 - 18)  SpO2:  (97% - 99%); now on NRB   labs done showed no wbc, anemia 10.2 Hb, INR 0.96, Na 149, bicarb 36, VBG with hypercapnia, troponin 0.08  CT chest with small RLL pulmonary embolus w/o RHS and bibasilar patchy consolidative opacities   pt received lovenox, rocephin azithromycin  pt is admitted for workup/management PE/ worsening ALS   (19 Sep 2022 06:25)     INTERVAL EVENTS:  9/20: pt comfortable on NC, eating lunch and feeding herself. not verbal but able to communicate and comprehend. son and other family at bedside     ADVANCE DIRECTIVES:    MOLST  [ ]  Living Will  [ ]   DECISION MAKER(s):  [X ] Health Care Proxy(s)  [ ] Surrogate(s)  [ ] Guardian           Name(s): Phone Number(s): Son- HCP form is in chart     BASELINE (I)ADL(s) (prior to admission):  [place]: [ ]Total  [ ] Moderate [ X]Dependent  Palliative Performance Status Version 2:        30 %    http://npcrc.org/files/news/palliative_performance_scale_ppsv2.pdf    Allergies    No Known Allergies    Intolerances    MEDICATIONS  (STANDING):  ampicillin/sulbactam  IVPB      ampicillin/sulbactam  IVPB 1.5 Gram(s) IV Intermittent every 6 hours  atorvastatin Oral Tab/Cap - Peds 10 milliGRAM(s) Oral daily  chlorhexidine 2% Cloths 1 Application(s) Topical <User Schedule>  dextrose 5%. 1000 milliLiter(s) (50 mL/Hr) IV Continuous <Continuous>  dextrose 5%. 1000 milliLiter(s) (100 mL/Hr) IV Continuous <Continuous>  dextrose 50% Injectable 25 Gram(s) IV Push once  dextrose 50% Injectable 12.5 Gram(s) IV Push once  dextrose 50% Injectable 25 Gram(s) IV Push once  enoxaparin Injectable 40 milliGRAM(s) SubCutaneous every 12 hours  glucagon  Injectable 1 milliGRAM(s) IntraMuscular once  insulin lispro (ADMELOG) corrective regimen sliding scale   SubCutaneous three times a day before meals  losartan 25 milliGRAM(s) Oral daily  riluzole 50 milliGRAM(s) Oral two times a day  scopolamine 1 mG/72 Hr(s) Patch 1 Patch Transdermal every 72 hours    MEDICATIONS  (PRN):  acetaminophen     Tablet .. 650 milliGRAM(s) Oral every 6 hours PRN Temp greater or equal to 38C (100.4F), Mild Pain (1 - 3)  aluminum hydroxide/magnesium hydroxide/simethicone Suspension 30 milliLiter(s) Oral every 4 hours PRN Dyspepsia  dextrose Oral Gel 15 Gram(s) Oral once PRN Blood Glucose LESS THAN 70 milliGRAM(s)/deciliter  melatonin 3 milliGRAM(s) Oral at bedtime PRN Insomnia  ondansetron Injectable 4 milliGRAM(s) IV Push every 8 hours PRN Nausea and/or Vomiting    PRESENT SYMPTOMS:   Pain: [ ]yes [X ]no  QOL impact -   Location -                    Aggravating factors -  Quality -  Radiation -  Timing-  Severity (0-10 scale):  Minimal acceptable level (0-10 scale):     PAIN AD Score: 0    [email or web address] (press ctrl +  left click to view)    Dyspnea:                           [ ]Mild [ ]Moderate [ ]Severe  Anxiety:                             [ ]Mild [ ]Moderate [ ]Severe  Fatigue:                             [ ]Mild [ ]Moderate [ ]Severe  Nausea:                             [ ]Mild [ ]Moderate [ ]Severe  Loss of appetite:              [ ]Mild [ ]Moderate [ ]Severe  Constipation:                    [ ]Mild [ ]Moderate [ ]Severe    Other Symptoms:  [ X ]All other review of systems negative     Palliative Performance Status Version 2:    30  %    http://npcrc.org/files/news/palliative_performance_scale_ppsv2.pdf    PHYSICAL EXAM:  Vital Signs Last 24 Hrs  T(C): 36.8 (20 Sep 2022 12:56), Max: 36.8 (19 Sep 2022 16:51)  T(F): 98.2 (20 Sep 2022 12:56), Max: 98.2 (19 Sep 2022 16:51)  HR: 118 (20 Sep 2022 12:56) (81 - 118)  BP: 114/72 (20 Sep 2022 12:56) (90/65 - 122/66)  BP(mean): --  RR: 19 (20 Sep 2022 12:56) (16 - 19)  SpO2: 98% (20 Sep 2022 08:33) (96% - 100%)    GENERAL:  [X ]Alert  [ X]Oriented x 3   [ ]Lethargic  [ ]Cachexia  [ ]Unarousable  [ ]Verbal  [ X]Non-Verbal  Behavioral:   [ ] Anxiety  [ ] Delirium [ ] Agitation [X ] Calm   HEENT:  [ X]Normal   [ ]Dry mouth   [ ]ET Tube/Trach  [ ]Oral lesions  PULMONARY:   [ X]Clear [ ]Tachypnea  [ ]Audible excessive secretions   On nasal cannula  CARDIOVASCULAR:    [ X]Regular [ ]Irregular [ ]Tachy  [ ]Hunter [ ]Murmur [ ]Other  GASTROINTESTINAL:  [X ]Soft  [ ]Distended   [ ]+BS  [ ]Non tender [ ]Tender  [ ]PEG [ ]OGT/ NGT  Last BM:   GENITOURINARY:  [ X]Normal [ ] Incontinent   [ ]Oliguria/Anuria   [ ]Damon  MUSCULOSKELETAL:   [ ]Normal   [ ]Weakness  [X ]Bed/Wheelchair bound [ ]Edema  NEUROLOGIC:   [ ]No focal deficits  [ ]Cognitive impairment  [ ]Dysphagia [ ]Dysarthria [ ]Paresis [X ]Other  - weakness of legs, continues to have use of her hands, nonverbal   SKIN:   [ X]Normal    [ ]Rash  [ ]Pressure ulcer(s)       Present on admission [ ]y [ ]n    LABS:                        10.0   7.85  )-----------( 125      ( 19 Sep 2022 12:06 )             30.5   09-19    149<H>  |  104  |  27<H>  ----------------------------<  86  3.1<L>   |  34<H>  |  0.7    Ca    9.0      19 Sep 2022 12:06  Mg     1.8     09-19    TPro  5.4<L>  /  Alb  4.0  /  TBili  0.9  /  DBili  x   /  AST  41  /  ALT  30  /  AlkPhos  95  09-19  PT/INR - ( 18 Sep 2022 23:10 )   PT: 11.10 sec;   INR: 0.96 ratio         PTT - ( 18 Sep 2022 23:10 )  PTT:29.9 sec      RADIOLOGY & ADDITIONAL STUDIES:    < from: CT Angio Chest PE Protocol w/ IV Cont (09.19.22 @ 02:26) >      1.  Small right lower lobe pulmonary emboli. No evidence of right heart   strain.  2.  Left greater than right patchy consolidative opacities may represent   atelectasis or pneumonia in the appropriate clinical setting.    --- End of Report ---    ***Please see the addendum at the top of this report. It may contain     < end of copied text >          HPI:    72-year-old female with PMHx of ALS, DM, HTN, HLD, recent covid presents with shortness of breath x1 day.  Patient reportedly 86% on room air at home. No other complaints except for LE swelling and pain at the buttocks. Denies fevers, chills, chest pain, cough, abdominal pain, nausea, vomiting, rash.  Per d/w daughter, ALS diagnosed 2020. Has recently begun to progress rapidly. Patient was able to ambulate one week prior to presentation using rolling walker. Now patient has been bed bound, too weak to ambulate. Pt also with worsening dysphagia per daughter. Family requesting social work/ case management assistance in helping coordinate safe discharge and equipment to help take care of patient at home.     in the ED,pt's VS T(C): 36.4 -36.9  HR:  (67 - 98)  BP: (106/71 - 135/72)  RR:  (17 - 18)  SpO2:  (97% - 99%); now on NRB   labs done showed no wbc, anemia 10.2 Hb, INR 0.96, Na 149, bicarb 36, VBG with hypercapnia, troponin 0.08  CT chest with small RLL pulmonary embolus w/o RHS and bibasilar patchy consolidative opacities   pt received lovenox, rocephin azithromycin  pt is admitted for workup/management PE/ worsening ALS   (19 Sep 2022 06:25)     INTERVAL EVENTS:  9/20: pt comfortable on NC, eating lunch and feeding herself. not verbal but able to communicate and comprehend. son and other family at bedside     ADVANCE DIRECTIVES:    MOLST  [ ]  Living Will  [ ]   DECISION MAKER(s):  [X ] Health Care Proxy(s)  [ ] Surrogate(s)  [ ] Guardian           Name(s): Phone Number(s): Son- HCP form is in chart     BASELINE (I)ADL(s) (prior to admission):  [place]: [ ]Total  [ ] Moderate [ X]Dependent  Palliative Performance Status Version 2:        30 %    http://npcrc.org/files/news/palliative_performance_scale_[email or web address].pdf    Allergies    No Known Allergies    Intolerances    MEDICATIONS  (STANDING):  ampicillin/sulbactam  IVPB      ampicillin/sulbactam  IVPB 1.5 Gram(s) IV Intermittent every 6 hours  atorvastatin Oral Tab/Cap - Peds 10 milliGRAM(s) Oral daily  chlorhexidine 2% Cloths 1 Application(s) Topical <User Schedule>  dextrose 5%. 1000 milliLiter(s) (50 mL/Hr) IV Continuous <Continuous>  dextrose 5%. 1000 milliLiter(s) (100 mL/Hr) IV Continuous <Continuous>  dextrose 50% Injectable 25 Gram(s) IV Push once  dextrose 50% Injectable 12.5 Gram(s) IV Push once  dextrose 50% Injectable 25 Gram(s) IV Push once  enoxaparin Injectable 40 milliGRAM(s) SubCutaneous every 12 hours  glucagon  Injectable 1 milliGRAM(s) IntraMuscular once  insulin lispro (ADMELOG) corrective regimen sliding scale   SubCutaneous three times a day before meals  losartan 25 milliGRAM(s) Oral daily  riluzole 50 milliGRAM(s) Oral two times a day  scopolamine 1 mG/72 Hr(s) Patch 1 Patch Transdermal every 72 hours    MEDICATIONS  (PRN):  acetaminophen     Tablet .. 650 milliGRAM(s) Oral every 6 hours PRN Temp greater or equal to 38C (100.4F), Mild Pain (1 - 3)  aluminum hydroxide/magnesium hydroxide/simethicone Suspension 30 milliLiter(s) Oral every 4 hours PRN Dyspepsia  dextrose Oral Gel 15 Gram(s) Oral once PRN Blood Glucose LESS THAN 70 milliGRAM(s)/deciliter  melatonin 3 milliGRAM(s) Oral at bedtime PRN Insomnia  ondansetron Injectable 4 milliGRAM(s) IV Push every 8 hours PRN Nausea and/or Vomiting    PRESENT SYMPTOMS:   Pain: [ ]yes [X ]no  QOL impact -   Location -                    Aggravating factors -  Quality -  Radiation -  Timing-  Severity (0-10 scale):  Minimal acceptable level (0-10 scale):     PAIN AD Score: 0    [email or web address] (press ctrl +  left click to view)    Dyspnea:                           [ ]Mild [ ]Moderate [ ]Severe  Anxiety:                             [ ]Mild [ ]Moderate [ ]Severe  Fatigue:                             [ ]Mild [ ]Moderate [ ]Severe  Nausea:                             [ ]Mild [ ]Moderate [ ]Severe  Loss of appetite:              [ ]Mild [ ]Moderate [ ]Severe  Constipation:                    [ ]Mild [ ]Moderate [ ]Severe    Other Symptoms:  [ X ]All other review of systems negative     Palliative Performance Status Version 2:    30  %    http://npcrc.org/files/news/palliative_performance_scale_[email or web address].pdf    PHYSICAL EXAM:  Vital Signs Last 24 Hrs  T(C): 36.8 (20 Sep 2022 12:56), Max: 36.8 (19 Sep 2022 16:51)  T(F): 98.2 (20 Sep 2022 12:56), Max: 98.2 (19 Sep 2022 16:51)  HR: 118 (20 Sep 2022 12:56) (81 - 118)  BP: 114/72 (20 Sep 2022 12:56) (90/65 - 122/66)  BP(mean): --  RR: 19 (20 Sep 2022 12:56) (16 - 19)  SpO2: 98% (20 Sep 2022 08:33) (96% - 100%)    GENERAL:  [X ]Alert  [ X]Oriented x 3   [ ]Lethargic  [ ]Cachexia  [ ]Unarousable  [ ]Verbal  [ X]Non-Verbal  Behavioral:   [ ] Anxiety  [ ] Delirium [ ] Agitation [X ] Calm   HEENT:  [ X]Normal   [ ]Dry mouth   [ ]ET Tube/Trach  [ ]Oral lesions  PULMONARY:   [ X]Not labored [ ]Tachypnea  [ ]Audible excessive secretions   On nasal cannula  CARDIOVASCULAR:    [ X]Regular [ ]Irregular [ ]Tachy  [ ]Hunter [ ]Murmur [ ]Other  GASTROINTESTINAL:  [X ]Nondistended  [ ]Distended   [ ]+BS  [ ]Non tender [ ]Tender  [ ]PEG [ ]OGT/ NGT  Last BM:   GENITOURINARY:  [ X]Normal [ ] Incontinent   [ ]Oliguria/Anuria   [ ]Damon  MUSCULOSKELETAL:   [ ]Normal   [ ]Weakness  [X ]Bed/Wheelchair bound [ ]Edema  NEUROLOGIC:   [ ]No focal deficits  [ ]Cognitive impairment  [ ]Dysphagia [ ]Dysarthria [ ]Paresis [X ]Other  - weakness of legs, continues to have use of her hands, nonverbal   SKIN:   [ X]No jaundice    [ ]Rash  [ ]Pressure ulcer(s)       Present on admission [ ]y [ ]n    LABS:                                 10.0   7.85  )-----------( 125      ( 19 Sep 2022 12:06 )             30.5     09-19    149<H>  |  104  |  27<H>  ----------------------------<  86  3.1<L>   |  34<H>  |  0.7    Ca    9.0      19 Sep 2022 12:06  Mg     1.8     09-19          RADIOLOGY & ADDITIONAL STUDIES:    < from: CT Angio Chest PE Protocol w/ IV Cont (09.19.22 @ 02:26) >      1.  Small right lower lobe pulmonary emboli. No evidence of right heart   strain.  2.  Left greater than right patchy consolidative opacities may represent   atelectasis or pneumonia in the appropriate clinical setting.    --- End of Report ---    ***Please see the addendum at the top of this report. It may contain     < end of copied text >

## 2022-09-20 NOTE — PROGRESS NOTE ADULT - PROBLEM SELECTOR PLAN 2
patient and family are open to PEG as long as she can eat by mouth as well  recs per S & S  PEG placement - pending GI c/s

## 2022-09-20 NOTE — OCCUPATIONAL THERAPY INITIAL EVALUATION ADULT - PERTINENT HX OF CURRENT PROBLEM, REHAB EVAL
HPI: 72-year-old female with PMHx of ALS, DM, HTN, HLD, recent covid presents with shortness of breath x1 day.  Patient reportedly 86% on room air at home. No other complaints except for LE swelling and pain at the buttocks. Denies fevers, chills, chest pain, cough, abdominal pain, nausea, vomiting, rash. Per d/w daughter, ALS diagnosed 2020. Has recently begun to progress rapidly. Patient was able to ambulate one week prior to presentation using rolling walker. Now patient has been bed bound, too weak to ambulate. Pt also with worsening dysphagia per daughter. Family requesting social work/ case management assistance in helping coordinate safe discharge and equipment to help take care of patient at home.

## 2022-09-20 NOTE — CONSULT NOTE ADULT - SUBJECTIVE AND OBJECTIVE BOX
Gastroenterology Consultation:    Patient is a 72y old  Female who presents with a chief complaint of sob (19 Sep 2022 16:20)        Admitted on: 09-19-22      HPI:  72-year-old female with PMHx of ALS, DM, HTN, HLD, recent covid presents with shortness of breath x1 day.  Patient reportedly 86% on room air at home. No other complaints except for LE swelling and pain at the buttocks. Denies fevers, chills, chest pain, cough, abdominal pain, nausea, vomiting, rash.  Per d/w daughter, ALS diagnosed 2020. Has recently begun to progress rapidly. Patient was able to ambulate one week prior to presentation using rolling walker. Now patient has been bed bound, too weak to ambulate. Pt also with worsening dysphagia per daughter. Family requesting social work/ case management assistance in helping coordinate safe discharge and equipment to help take care of patient at home.     in the ED,pt's VS T(C): 36.4 -36.9  HR:  (67 - 98)  BP: (106/71 - 135/72)  RR:  (17 - 18)  SpO2:  (97% - 99%); now on NRB   labs done showed no wbc, anemia 10.2 Hb, INR 0.96, Na 149, bicarb 36, VBG with hypercapnia, troponin 0.08  CT chest with small RLL pulmonary embolus w/o RHS and bibasilar patchy consolidative opacities   pt received lovenox, rocephin azithromycin  pt is admitted for workup/management PE/ worsening ALS   (19 Sep 2022 06:25)        Prior EGD:    Prior Colonoscopy:      PAST MEDICAL & SURGICAL HISTORY:  Amyotrophic lateral sclerosis (ALS)            FAMILY HISTORY:      Social History:  Tobacco:  Alcohol:  Drugs:    Home Medications:  atorvastatin 10 mg oral tablet: 1 tab(s) orally once a day (19 Sep 2022 07:39)  losartan 25 mg oral tablet: 1 tab(s) orally once a day (19 Sep 2022 07:39)  metFORMIN 500 mg oral tablet: 1 tab(s) orally 2 times a day (19 Sep 2022 07:39)  riluzole 50 mg oral tablet: 1 tab(s) orally every 12 hours (19 Sep 2022 07:39)        MEDICATIONS  (STANDING):  ampicillin/sulbactam  IVPB      ampicillin/sulbactam  IVPB 1.5 Gram(s) IV Intermittent every 6 hours  atorvastatin Oral Tab/Cap - Peds 10 milliGRAM(s) Oral daily  chlorhexidine 2% Cloths 1 Application(s) Topical <User Schedule>  dextrose 5%. 1000 milliLiter(s) (50 mL/Hr) IV Continuous <Continuous>  dextrose 5%. 1000 milliLiter(s) (100 mL/Hr) IV Continuous <Continuous>  dextrose 50% Injectable 25 Gram(s) IV Push once  dextrose 50% Injectable 12.5 Gram(s) IV Push once  dextrose 50% Injectable 25 Gram(s) IV Push once  enoxaparin Injectable 40 milliGRAM(s) SubCutaneous every 12 hours  glucagon  Injectable 1 milliGRAM(s) IntraMuscular once  insulin lispro (ADMELOG) corrective regimen sliding scale   SubCutaneous three times a day before meals  losartan 25 milliGRAM(s) Oral daily  riluzole 50 milliGRAM(s) Oral two times a day  scopolamine 1 mG/72 Hr(s) Patch 1 Patch Transdermal every 72 hours    MEDICATIONS  (PRN):  acetaminophen     Tablet .. 650 milliGRAM(s) Oral every 6 hours PRN Temp greater or equal to 38C (100.4F), Mild Pain (1 - 3)  aluminum hydroxide/magnesium hydroxide/simethicone Suspension 30 milliLiter(s) Oral every 4 hours PRN Dyspepsia  dextrose Oral Gel 15 Gram(s) Oral once PRN Blood Glucose LESS THAN 70 milliGRAM(s)/deciliter  melatonin 3 milliGRAM(s) Oral at bedtime PRN Insomnia  ondansetron Injectable 4 milliGRAM(s) IV Push every 8 hours PRN Nausea and/or Vomiting      Allergies  No Known Allergies      Review of Systems:   Constitutional:  No Fever, No Chills  ENT/Mouth:  No Hearing Changes,  No Difficulty Swallowing  Eyes:  No Eye Pain, No Vision Changes  Cardiovascular:  No Chest Pain, No Palpitations  Respiratory:  No Cough, No Dyspnea  Gastrointestinal:  As described in HPI  Musculoskeletal:  No Joint Swelling, No Back Pain  Skin:  No Skin Lesions, No Jaundice  Neuro:  No Syncope, No Dizziness  Heme/Lymph:  No Bruising, No Bleeding.          Physical Examination:  T(C): 36.7 (09-20-22 @ 05:00), Max: 36.8 (09-19-22 @ 16:51)  HR: 81 (09-20-22 @ 05:00) (81 - 111)  BP: 114/66 (09-20-22 @ 05:00) (90/65 - 175/91)  RR: 16 (09-20-22 @ 05:00) (16 - 18)  SpO2: 98% (09-20-22 @ 08:33) (93% - 100%)      09-19-22 @ 07:01  -  09-20-22 @ 07:00  --------------------------------------------------------  IN: 0 mL / OUT: 900 mL / NET: -900 mL          GENERAL: AAOx3, no acute distress.  HEAD:  Atraumatic, Normocephalic  EYES: conjunctiva and sclera clear  NECK: Supple, no JVD or thyromegaly  CHEST/LUNG: Clear to auscultation bilaterally; No wheeze, rhonchi, or rales  HEART: Regular rate and rhythm; normal S1, S2, No murmurs.  ABDOMEN: Soft, nontender, nondistended; Bowel sounds present  NEUROLOGY: No asterixis or tremor.   SKIN: Intact, no jaundice        Data:                        10.0   7.85  )-----------( 125      ( 19 Sep 2022 12:06 )             30.5     Hgb Trend:  10.0  09-19-22 @ 12:06  10.2  09-18-22 @ 23:10        09-19    149<H>  |  104  |  27<H>  ----------------------------<  86  3.1<L>   |  34<H>  |  0.7    Ca    9.0      19 Sep 2022 12:06  Mg     1.8     09-19    TPro  5.4<L>  /  Alb  4.0  /  TBili  0.9  /  DBili  x   /  AST  41  /  ALT  30  /  AlkPhos  95  09-19    Liver panel trend:  TBili 0.9   /   AST 41   /   ALT 30   /   AlkP 95   /   Tptn 5.4   /   Alb 4.0    /   DBili --      09-19  TBili 1.0   /   AST 56   /   ALT 32   /   AlkP 94   /   Tptn 5.7   /   Alb 4.0    /   DBili --      09-18      PT/INR - ( 18 Sep 2022 23:10 )   PT: 11.10 sec;   INR: 0.96 ratio         PTT - ( 18 Sep 2022 23:10 )  PTT:29.9 sec        Radiology:       Gastroenterology Consultation:    Patient is a 72y old  Female who presents with a chief complaint of sob (19 Sep 2022 16:20)        Admitted on: 09-19-22      HPI:  72-year-old female with PMHx of ALS, DM, HTN, HLD, recent covid presents with shortness of breath x1 day.  Patient reportedly 86% on room air at home. No other complaints except for LE swelling and pain at the buttocks. Denies fevers, chills, chest pain, cough, abdominal pain, nausea, vomiting, rash.  Per d/w daughter, ALS diagnosed 2020. Has recently begun to progress rapidly. Patient was able to ambulate one week prior to presentation using rolling walker. Now patient has been bed bound, too weak to ambulate. Pt also with worsening dysphagia per daughter. Family requesting social work/ case management assistance in helping coordinate safe discharge and equipment to help take care of patient at home.     in the ED,pt's VS T(C): 36.4 -36.9  HR:  (67 - 98)  BP: (106/71 - 135/72)  RR:  (17 - 18)  SpO2:  (97% - 99%); now on NRB   labs done showed no wbc, anemia 10.2 Hb, INR 0.96, Na 149, bicarb 36, VBG with hypercapnia, troponin 0.08  CT chest with small RLL pulmonary embolus w/o RHS and bibasilar patchy consolidative opacities   pt received lovenox, rocephin azithromycin  pt is admitted for workup/management PE/ worsening ALS   (19 Sep 2022 06:25)    Pt here with progressive ALS, having dysphagia and suspected micro-aspirations, and found to have new RLL PE, on therapeutic lovenox. GI was consulted for evaluation of PEG tube placement. Pt was diagnosed with ALS in 2020, which became much more rapidly progressive over the past few months. One of the initial symptoms was dysphagia and trouble talking, and it has gotten progressively worse; now only tolerating Pureed diet with thickened liquids, and having trouble mostly with the swallowing phase; associated with occasional coughing especially after un-thickened water. Discussed the option of PEG tube placement with patient and son at bedside. Family is aware of the poor prognosis and the progressive nature of ALS, and agree that she does have trouble swallowing, and mostly eating enough to maintain her caloric needs. The patient is agreeable to a PEG tube, but expressed her wish to be able to enjoy PO intake for as long as possible, regardless of consistency and even if she gets a PEG tube. She is okay with getting a PEG tube and supplementing feeds with comfort PO feeds, with recommendations from SLP to minimize risk of aspirations, but understands that she will need supplemental feedings to keep up her caloric needs.     Prior EGD: Does not remember any past EGDs    Prior Colonoscopy: 3/2020: Normal as per family, only showed internal hemorrhoids       PAST MEDICAL & SURGICAL HISTORY:  Amyotrophic lateral sclerosis (ALS)      FAMILY HISTORY: Pancreatic Ca in brother, no colon or other GI malignancies    Social History:  Tobacco: None  Alcohol: None  Drugs: None    Home Medications:  atorvastatin 10 mg oral tablet: 1 tab(s) orally once a day (19 Sep 2022 07:39)  losartan 25 mg oral tablet: 1 tab(s) orally once a day (19 Sep 2022 07:39)  metFORMIN 500 mg oral tablet: 1 tab(s) orally 2 times a day (19 Sep 2022 07:39)  riluzole 50 mg oral tablet: 1 tab(s) orally every 12 hours (19 Sep 2022 07:39)        MEDICATIONS  (STANDING):  ampicillin/sulbactam  IVPB      ampicillin/sulbactam  IVPB 1.5 Gram(s) IV Intermittent every 6 hours  atorvastatin Oral Tab/Cap - Peds 10 milliGRAM(s) Oral daily  chlorhexidine 2% Cloths 1 Application(s) Topical <User Schedule>  dextrose 5%. 1000 milliLiter(s) (50 mL/Hr) IV Continuous <Continuous>  dextrose 5%. 1000 milliLiter(s) (100 mL/Hr) IV Continuous <Continuous>  dextrose 50% Injectable 25 Gram(s) IV Push once  dextrose 50% Injectable 12.5 Gram(s) IV Push once  dextrose 50% Injectable 25 Gram(s) IV Push once  enoxaparin Injectable 40 milliGRAM(s) SubCutaneous every 12 hours  glucagon  Injectable 1 milliGRAM(s) IntraMuscular once  insulin lispro (ADMELOG) corrective regimen sliding scale   SubCutaneous three times a day before meals  losartan 25 milliGRAM(s) Oral daily  riluzole 50 milliGRAM(s) Oral two times a day  scopolamine 1 mG/72 Hr(s) Patch 1 Patch Transdermal every 72 hours    MEDICATIONS  (PRN):  acetaminophen     Tablet .. 650 milliGRAM(s) Oral every 6 hours PRN Temp greater or equal to 38C (100.4F), Mild Pain (1 - 3)  aluminum hydroxide/magnesium hydroxide/simethicone Suspension 30 milliLiter(s) Oral every 4 hours PRN Dyspepsia  dextrose Oral Gel 15 Gram(s) Oral once PRN Blood Glucose LESS THAN 70 milliGRAM(s)/deciliter  melatonin 3 milliGRAM(s) Oral at bedtime PRN Insomnia  ondansetron Injectable 4 milliGRAM(s) IV Push every 8 hours PRN Nausea and/or Vomiting      Allergies  No Known Allergies      Review of Systems:   Constitutional:  No Fever, No Chills. Aphasia, communicates mostly by writing.   ENT/Mouth:  No Hearing Changes,  No Difficulty Swallowing  Eyes:  No Eye Pain, No Vision Changes  Cardiovascular:  No Chest Pain, No Palpitations  Respiratory:  No Cough, No Dyspnea  Gastrointestinal:  As described in HPI  Musculoskeletal:  No Joint Swelling  Skin:  No Skin Lesions, No Jaundice  Neuro:  No Syncope, No Dizziness  Heme/Lymph:  No Bruising, No Bleeding.      Physical Examination:  T(C): 36.7 (09-20-22 @ 05:00), Max: 36.8 (09-19-22 @ 16:51)  HR: 81 (09-20-22 @ 05:00) (81 - 111)  BP: 114/66 (09-20-22 @ 05:00) (90/65 - 175/91)  RR: 16 (09-20-22 @ 05:00) (16 - 18)  SpO2: 98% (09-20-22 @ 08:33) (93% - 100%)      09-19-22 @ 07:01  -  09-20-22 @ 07:00  --------------------------------------------------------  IN: 0 mL / OUT: 900 mL / NET: -900 mL        GENERAL: AAOx3, no acute distress. Communicates by writing on paper, difficulty speaking. Cachectic  HEAD:  Atraumatic, Normocephalic  EYES: conjunctiva and sclera clear  NECK: Supple, no JVD or thyromegaly  CHEST/LUNG: Decreased air entry bilaterally; No wheezes  HEART: Regular rate and rhythm; normal S1, S2, No murmurs.  ABDOMEN: Soft, nontender, nondistended; No rigidity/guarding  NEUROLOGY: No asterixis or tremor.   SKIN: Intact, no jaundice      Data:                        10.0   7.85  )-----------( 125      ( 19 Sep 2022 12:06 )             30.5     Hgb Trend:  10.0  09-19-22 @ 12:06  10.2  09-18-22 @ 23:10        09-19    149<H>  |  104  |  27<H>  ----------------------------<  86  3.1<L>   |  34<H>  |  0.7    Ca    9.0      19 Sep 2022 12:06  Mg     1.8     09-19    TPro  5.4<L>  /  Alb  4.0  /  TBili  0.9  /  DBili  x   /  AST  41  /  ALT  30  /  AlkPhos  95  09-19    Liver panel trend:  TBili 0.9   /   AST 41   /   ALT 30   /   AlkP 95   /   Tptn 5.4   /   Alb 4.0    /   DBili --      09-19  TBili 1.0   /   AST 56   /   ALT 32   /   AlkP 94   /   Tptn 5.7   /   Alb 4.0    /   DBili --      09-18      PT/INR - ( 18 Sep 2022 23:10 )   PT: 11.10 sec;   INR: 0.96 ratio         PTT - ( 18 Sep 2022 23:10 )  PTT:29.9 sec        Radiology:    < from: Xray Cinesophagram Swallow Function w/ Contrast (04.18.22 @ 09:52) >  INTERPRETATION:  Clinical History / Reason for exam: Dysphagia    Procedure: Various consistencies of food were given to the patient and   swallowing was observed under fluoroscopy.  This study was performed in   conjunction with the speech pathology department.    Findings/  Impression:    Please refer to report from the department of speech pathology for   detailed description of the findings and recommendations.    --- End of Report ---    < end of copied text >  < from: CT Angio Chest PE Protocol w/ IV Cont (09.19.22 @ 02:26) >  INTERPRETATION:  CLINICAL HISTORY: Shortness of breath.    TECHNIQUE: CTA of the thorax was performed after administration of   contrast per the PE protocol with 75 cc of Omnipaque 350 intravenous   contrast. Sagittal, coronal, and MIP reformats were performed.    COMPARISON: None.      FINDINGS:  TUBES/LINES: None    PULMONARY ARTERIES: There are several small pulmonary emboli in the right   lower lobe.    LUNGS, PLEURA, AND AIRWAYS: Patent central airway. Patchy bibasilar, left   greater than right, consolidative opacities, likely atelectasis No   pleural effusion or pneumothorax.    HEART/GREAT VESSELS: Normal size heart. No evidence of right heart   strain. Normal caliber aorta and main pulmonary artery. No pericardial   effusion. Coronary and aortic artery calcifications.    MEDIASTINUM/LYMPH NODES: No enlarged mediastinal, hilar, or axillary   lymph nodes. Visualized portion of the thyroid gland is unremarkable.    UPPER ABDOMEN: Left hemidiaphragm elevation.    BONES AND SOFT TISSUES: Degenerative changes of the spine. Thoracic   dextroscoliosis.      IMPRESSION:      1.  Small right lower lobe pulmonary emboli. No evidence of right heart   strain.  2.  Left greater than right patchy consolidative opacities may represent   atelectasis or pneumonia in the appropriate clinical setting.    --- End of Report ---    < end of copied text >

## 2022-09-20 NOTE — OCCUPATIONAL THERAPY INITIAL EVALUATION ADULT - NSACTIVITYREC_GEN_A_OT
Patient requires max assistance with activities of daily living. OT recommends D/C to rehabilitation facility when medically appropriate. Refer to evaluation for details.

## 2022-09-20 NOTE — PROGRESS NOTE ADULT - ASSESSMENT
72-year-old female with PMHx of ALS, DM, HTN, HLD, recent covid presents with shortness of breath x1 day.  Patient reportedly 86% on room air at home. No other complaints except for LE swelling and pain at the buttocks. Denies fevers, chills, chest pain, cough, abdominal pain, nausea, vomiting, rash.    #acute hypoxic respiratory failure  #chronic hypercapnia secondary to ALS  #RLL pulmonary embolus/ No RHS  CT PE: Small RLL PE; Patchy LLL consolidative opacities  Unlikely pneumonia, no leukocytosis, fevers  No RV dysfunction on echo  Procalcitonin .26  On 3L NC O2 saturating 96-97%  - Cont therapeutic Lovenox --> on discharge, Eliquis 10 BID x7d, then eliquis 5BID (copay 40$ at [facility])  - unasyn 1.5 q6h for now, f/u procalcitonin  - Wean off O2 as tolerated, will likely need home O2    #ALS  - f/u neurology recommendations  - Cont home meds  - Palliative care on board  - Puree diet w/ mildly thick liquids per s/s. PEG recommended to supplement  - GI on board for PEG placement, likely to happen Thursday 09/22  - Pulm eval for risk stratification/optimization give recent PE. Pt will need to be off   - CM f/u for homecare    #HTN/HLD  - Cont losartan 25mg qD  - Cont atorvastatin 10mg qD    #DM2  - ISS    DVT PPX, Lovenox    #Progress Note Handoff  Pending (specify): Wean off O2 as tolerated, PEG placement  Family discussion: d/w pt and family regarding tx for PE, PEG  Disposition: Home with services.

## 2022-09-20 NOTE — OCCUPATIONAL THERAPY INITIAL EVALUATION ADULT - SOCIAL CONCERNS
With 24 hours of fever T-max 103 looks nontoxic in pediatrician's office no source of infection white blood cell count was 29,000 in office 38 lymphs 55 neutrophils this probably needs to be repeated possibly needs urine there is no source of infection being sent over from [facility] pediatrics     Bartolo Gilbert MD  10/14/21 [id number]     None

## 2022-09-20 NOTE — PROGRESS NOTE ADULT - SUBJECTIVE AND OBJECTIVE BOX
FAITH FU  72y, Female  Allergy: No Known Allergies    Hospital Day: 1d    Patient seen and examined. No acute events overnight    PMH/PSH:  PAST MEDICAL & SURGICAL HISTORY:  Amyotrophic lateral sclerosis (ALS)          VITALS:  T(F): 98.2 (09-20-22 @ 12:56), Max: 98.2 (09-19-22 @ 16:51)  HR: 118 (09-20-22 @ 12:56)  BP: 114/72 (09-20-22 @ 12:56) (90/65 - 122/66)  RR: 19 (09-20-22 @ 12:56)  SpO2: 98% (09-20-22 @ 08:33)    TESTS & MEASUREMENTS:  Weight (Kg): 39.5 (09-20-22 @ 10:22)      09-19-22 @ 07:01  -  09-20-22 @ 07:00  --------------------------------------------------------  IN: 0 mL / OUT: 900 mL / NET: -900 mL    09-20-22 @ 07:01  -  09-20-22 @ 15:52  --------------------------------------------------------  IN: 0 mL / OUT: 900 mL / NET: -900 mL                            10.0   7.85  )-----------( 125      ( 19 Sep 2022 12:06 )             30.5     PT/INR - ( 18 Sep 2022 23:10 )   PT: 11.10 sec;   INR: 0.96 ratio         PTT - ( 18 Sep 2022 23:10 )  PTT:29.9 sec  09-19    149<H>  |  104  |  27<H>  ----------------------------<  86  3.1<L>   |  34<H>  |  0.7    Ca    9.0      19 Sep 2022 12:06  Mg     1.8     09-19    TPro  5.4<L>  /  Alb  4.0  /  TBili  0.9  /  DBili  x   /  AST  41  /  ALT  30  /  AlkPhos  95  09-19    LIVER FUNCTIONS - ( 19 Sep 2022 12:06 )  Alb: 4.0 g/dL / Pro: 5.4 g/dL / ALK PHOS: 95 U/L / ALT: 30 U/L / AST: 41 U/L / GGT: x           CARDIAC MARKERS ( 19 Sep 2022 12:06 )  x     / 0.08 ng/mL / x     / x     / x      CARDIAC MARKERS ( 18 Sep 2022 23:10 )  x     / 0.08 ng/mL / x     / x     / x                RADIOLOGY & ADDITIONAL TESTS:    RECENT DIAGNOSTIC ORDERS:  Activated Partial Thromboplastin Time:  Start Date:20-Sep-2022. 20:00 (09-20-22 @ 15:49)  Prothrombin Time and INR, Plasma:  Start Date:20-Sep-2022. 20:00 (09-20-22 @ 15:49)  Magnesium, Serum: 20:00 (09-20-22 @ 15:49)  Phosphorus Level, Serum: 20:00 (09-20-22 @ 15:49)  Comprehensive Metabolic Panel: 20:00 (09-20-22 @ 15:49)  Complete Blood Count + Automated Diff: 20:00 (09-20-22 @ 15:49)  Magnesium, Serum: Repeat From: 20-Sep-2022 00:04 To: 26-Sep-2022 23:59, Every 1 day(s) (09-20-22 @ 00:04)  Phosphorus Level, Serum: Repeat From: 20-Sep-2022 00:04 To: 26-Sep-2022 23:59, Every 1 day(s) (09-20-22 @ 00:04)  Comprehensive Metabolic Panel: Repeat From: 20-Sep-2022 00:04 To: 26-Sep-2022 23:59, Every 1 day(s) (09-20-22 @ 00:04)  Complete Blood Count + Automated Diff: Repeat From: 20-Sep-2022 00:03 To: 26-Sep-2022 23:59, Every 1 day(s) (09-20-22 @ 00:04)      MEDICATIONS:  MEDICATIONS  (STANDING):  ampicillin/sulbactam  IVPB      ampicillin/sulbactam  IVPB 1.5 Gram(s) IV Intermittent every 6 hours  atorvastatin Oral Tab/Cap - Peds 10 milliGRAM(s) Oral daily  chlorhexidine 2% Cloths 1 Application(s) Topical <User Schedule>  dextrose 5%. 1000 milliLiter(s) (50 mL/Hr) IV Continuous <Continuous>  dextrose 5%. 1000 milliLiter(s) (100 mL/Hr) IV Continuous <Continuous>  dextrose 50% Injectable 25 Gram(s) IV Push once  dextrose 50% Injectable 12.5 Gram(s) IV Push once  dextrose 50% Injectable 25 Gram(s) IV Push once  enoxaparin Injectable 40 milliGRAM(s) SubCutaneous every 12 hours  glucagon  Injectable 1 milliGRAM(s) IntraMuscular once  insulin lispro (ADMELOG) corrective regimen sliding scale   SubCutaneous three times a day before meals  losartan 25 milliGRAM(s) Oral daily  riluzole 50 milliGRAM(s) Oral two times a day  scopolamine 1 mG/72 Hr(s) Patch 1 Patch Transdermal every 72 hours    MEDICATIONS  (PRN):  acetaminophen     Tablet .. 650 milliGRAM(s) Oral every 6 hours PRN Temp greater or equal to 38C (100.4F), Mild Pain (1 - 3)  aluminum hydroxide/magnesium hydroxide/simethicone Suspension 30 milliLiter(s) Oral every 4 hours PRN Dyspepsia  dextrose Oral Gel 15 Gram(s) Oral once PRN Blood Glucose LESS THAN 70 milliGRAM(s)/deciliter  hydrOXYzine hydrochloride 25 milliGRAM(s) Oral four times a day PRN Anxiety  melatonin 3 milliGRAM(s) Oral at bedtime PRN Insomnia  ondansetron Injectable 4 milliGRAM(s) IV Push every 8 hours PRN Nausea and/or Vomiting      HOME MEDICATIONS:  atorvastatin 10 mg oral tablet (09-20)  losartan 25 mg oral tablet (09-20)  metFORMIN 500 mg oral tablet (09-20)  riluzole 50 mg oral tablet (09-20)      REVIEW OF SYSTEMS:  All other review of systems is negative unless indicated above.     PHYSICAL EXAM:  PHYSICAL EXAM:  GENERAL: NAD, well-developed  HEAD:  Atraumatic, Normocephalic  NECK: Supple, No JVD  CHEST/LUNG: Clear to auscultation bilaterally; No wheeze  HEART: Regular rate and rhythm; No murmurs, rubs, or gallops  ABDOMEN: Soft, Nontender, Nondistended; Bowel sounds present  EXTREMITIES:  2+ Peripheral Pulses, No clubbing, cyanosis, or edema  SKIN: No rashes or lesions

## 2022-09-20 NOTE — CHART NOTE - NSCHARTNOTEFT_GEN_A_CORE
PALLIATIVE MEDICINE INTERDISCIPLINARY TEAM NOTE    Provider:                                         [  X ] Initial visit        Met with: [  X ] Patient  [ X  ] Family  [   ] Other:    Primary Language: [ X  ] English [   ] Other*:                      *Interpretation provided by:    SUPPORT DIAGNOSES            (Check all that apply)    [   ] EOL issues  [  X ] Advanced Illness  [   ] Cultural / spiritual concerns  [   ] Pain / suffering  [   ] Dementia / AMS  [   ] Other:  [  X ] AD issues  [   ] Grief / loss / sadness  [   ] Discharge issues  [ X  ] Distress / coping    PSYCHOSOCIAL ASSESSMENT OF PATIENT         (Check all that apply)    [ X  ] Initial Assessment            [   ] Reassessment          [   ] Not Applicable this visit    Pain/suffering acuity:  [   ] None to mild (0-3)           [  X ] Moderate (4-6)        [   ] High (7-10)    Mental Status:  [ X ] Alert/oriented (x3)          [   ] Confused/Altered(x2/x1)         [   ] Non-resp    Functional status:  [   ] Independent w ADLs      [  X ] Needs Assistance             [   ] Bedbound/Full Care    Coping:  [   ] Coping well                     [ X  ] Coping w/difficulty            [   ] Poor coping    Support system:  [  X ] Strong                              [   ] Adequate                        [   ] Inadequate      Past history and medications for:     [ ] Anxiety       [ ] Depression    [ ] Sleep disorders       SERVICE PROVIDED  [   ]Discharge support / facilitation  [   ]AD / goals of care counseling                                  [   ]EOL / death / bereavement counseling  [ X ]Counseling / support                                             [   ]Family meeting  [   ]Prayer / sacrament / ritual                                      [   ]Referral   [   ]Other                                                                       NOTE and Plan of Care (PoC):    patient is a 73 y/o F with noted pmhx, presenting with c/o SOB. visited patient earlier today. patient encountered resting in bed. patient did c/o pain today. her son/HCP Man at bedside. reviewed role of palliative care. discussed advance directives with patient and Man including code status. patient needed time to discuss with son. will follow up with patient and man tomorrow. [phone]

## 2022-09-20 NOTE — CONSULT NOTE ADULT - ATTENDING COMMENTS
ALS with progression to dysphagia. GI is consulted for PEG placement. Pt was also diagnosed with a new onset PE on AC. Will need pulm evaluation for optimization and risk stratification prior to endoscopic PEG placement off AC. monitor and treat electrolyte abnormalities. Pt was made aware that the PEG will not limit the risk of aspiration however when compared to PO intake, it can be assumed the incidence of  aspiration is lower becky patient with ALS causing oropharyngeal dysphagia.
Patient examined in ER with son at bedside.  He was able to confirm history.  Patient is very dysarthric so communicates by writing.  She is [place] so has advanced directives regarding blood products and that she does not want life prolonging measures if they are deemed to be futile.  I asked her and her son to be more specific regarding whether or not she would want to be intubated if noninvasive ventilation failed.  I explained the default practice would be to intubate without specific instruction to contrary.  I did explain I thought if she were intubated she would likely never be able to be extubated.  Recommend bedside pulmonary testing by pulmonary technologist to get a forced vital capacity and negative inspiratory force if possible.  Continue Riluzole and Radicava.  GI assessment for PEG.

## 2022-09-20 NOTE — CONSULT NOTE ADULT - ASSESSMENT
72-year-old female with PMHx of ALS, DM, HTN, HLD, recent covid presents with shortness of breath x1 day.  Patient reportedly 86% on room air at home. No other complaints except for LE swelling and pain at the buttocks. Denies fevers, chills, chest pain, cough, abdominal pain, nausea, vomiting, rash.  Per d/w daughter, ALS diagnosed 2020. Has recently begun to progress rapidly. Patient was able to ambulate one week prior to presentation using rolling walker. Now patient has been bed bound, too weak to ambulate. Pt also with worsening dysphagia per daughter. Family requesting social work/ case management assistance in helping coordinate safe discharge and equipment to help take care of patient at home.     in the ED,pt's VS T(C): 36.4 -36.9  HR:  (67 - 98)  BP: (106/71 - 135/72)  RR:  (17 - 18)  SpO2:  (97% - 99%); now on NRB   labs done showed no wbc, anemia 10.2 Hb, INR 0.96, Na 149, bicarb 36, VBG with hypercapnia, troponin 0.08  CT chest with small RLL pulmonary embolus w/o RHS and bibasilar patchy consolidative opacities   pt received lovenox, rocephin azithromycin  pt is admitted for workup/management PE/ worsening ALS   (19 Sep 2022 06:25)    Pt here with progressive ALS, having dysphagia and suspected micro-aspirations, and found to have new RLL PE, on therapeutic lovenox. GI was consulted for evaluation of PEG tube placement. Pt was diagnosed with ALS in 2020, which became much more rapidly progressive over the past few months. One of the initial symptoms was dysphagia and trouble talking, and it has gotten progressively worse; now only tolerating Pureed diet with thickened liquids, and having trouble mostly with the swallowing phase; associated with occasional coughing especially after un-thickened water. Discussed the option of PEG tube placement with patient and son at bedside. Family is aware of the poor prognosis and the progressive nature of ALS, and agree that she does have trouble swallowing, and mostly eating enough to maintain her caloric needs. The patient is agreeable to a PEG tube, but expressed her wish to be able to enjoy PO intake for as long as possible, regardless of consistency and even if she gets a PEG tube. She is okay with getting a PEG tube and supplementing feeds with comfort PO feeds, with recommendations from SLP to minimize risk of aspirations, but understands that she will need supplemental feedings to keep up her caloric needs.     #Malnutrition, dysphagia due to ALS - PEG tube evaluation  - Diagnosed with ALS in 2020, has been having progressive dysphagia, most notably rapidly progressive for the past few months  - SLP eval: Pureed diet with mildly thickened liquids; with alternate means of nutrition  - Patient  72-year-old female with PMHx of ALS, DM, HTN, HLD, recent covid presents with shortness of breath x1 day.  Patient reportedly 86% on room air at home. No other complaints except for LE swelling and pain at the buttocks. Denies fevers, chills, chest pain, cough, abdominal pain, nausea, vomiting, rash.  Per d/w daughter, ALS diagnosed 2020. Has recently begun to progress rapidly. Patient was able to ambulate one week prior to presentation using rolling walker. Now patient has been bed bound, too weak to ambulate. Pt also with worsening dysphagia per daughter. Family requesting social work/ case management assistance in helping coordinate safe discharge and equipment to help take care of patient at home.     in the ED,pt's VS T(C): 36.4 -36.9  HR:  (67 - 98)  BP: (106/71 - 135/72)  RR:  (17 - 18)  SpO2:  (97% - 99%); now on NRB   labs done showed no wbc, anemia 10.2 Hb, INR 0.96, Na 149, bicarb 36, VBG with hypercapnia, troponin 0.08  CT chest with small RLL pulmonary embolus w/o RHS and bibasilar patchy consolidative opacities   pt received lovenox, rocephin azithromycin  pt is admitted for workup/management PE/ worsening ALS   (19 Sep 2022 06:25)    Pt here with progressive ALS, having dysphagia and suspected micro-aspirations, and found to have new RLL PE, on therapeutic lovenox. GI was consulted for evaluation of PEG tube placement. Pt was diagnosed with ALS in 2020, which became much more rapidly progressive over the past few months. One of the initial symptoms was dysphagia and trouble talking, and it has gotten progressively worse; now only tolerating Pureed diet with thickened liquids, and having trouble mostly with the swallowing phase; associated with occasional coughing especially after un-thickened water. Discussed the option of PEG tube placement with patient and son at bedside. Family is aware of the poor prognosis and the progressive nature of ALS, and agree that she does have trouble swallowing, and mostly eating enough to maintain her caloric needs. The patient is agreeable to a PEG tube, but expressed her wish to be able to enjoy PO intake for as long as possible, regardless of consistency and even if she gets a PEG tube. She is okay with getting a PEG tube and supplementing feeds with comfort PO feeds, with recommendations from SLP to minimize risk of aspirations, but understands that she will need supplemental feedings to keep up her caloric needs.     #Malnutrition, dysphagia due to ALS - PEG tube evaluation  - Diagnosed with ALS in 2020, has been having progressive dysphagia, most notably rapidly progressive for the past few months  - SLP eval: Pureed diet with mildly thickened liquids; with alternate means of nutrition  - Patient agreeable to PEG placement; aware of risks and benefits, including risk of aspiration with feeding despite PEG placement  - Discussion had with patient and daughter, and previously with Son Jorge A (HCP) who expressed understanding    Plan  - Tentatively planned for PEG placement on Thursday  - Please obtain Pulm clearance to take off of anticoagulation for 24 hours prior to PEG  - Please optimize labs prior to EGD (Na 135-145; K 3.5-5.0; INR < 1.5)   - Discussed risks and benefits of procedure with daughter and pt, and previously with Son Jorge A who is the healthcare proxy  - Family and patient aware about risk of aspiration; further PO recs as per SLP

## 2022-09-20 NOTE — OCCUPATIONAL THERAPY INITIAL EVALUATION ADULT - PATIENT PROFILE REVIEW, REHAB EVAL
OT reviewed pt's chart prior to evaluation. Pt agreeable to OT evaluation. Pt seen from 9:45-10:20AM/yes

## 2022-09-20 NOTE — OCCUPATIONAL THERAPY INITIAL EVALUATION ADULT - ADDITIONAL COMMENTS
Pt's son verbalized pt has been getting progressively weak in the past week. Pt was able to ambulate until past Sunday.

## 2022-09-20 NOTE — PROGRESS NOTE ADULT - ASSESSMENT
72yFemale with PMH including ALS, DM, HTN, HLD, recent covid, being evaluated for GOC in the setting of admission for hypoxia, CT showed small RLL PE, possible aspiration pna.     Spoke with pt and family about advance directives today. They are not ready for hospice, want PEG. They will think about code status.     MEDD (morphine equivalent daily dose): 0      See Recs below.    Please call [phone] with questions or concerns 24/7.   We will continue to follow.     Discussed with primary MD.

## 2022-09-20 NOTE — PHYSICAL THERAPY INITIAL EVALUATION ADULT - PERTINENT HX OF CURRENT PROBLEM, REHAB EVAL
72-year-old female with PMHx of ALS, DM, HTN, HLD, recent covid presents with shortness of breath x1 day.  Patient reportedly 86% on room air at home. No other complaints except for LE swelling and pain at the buttocks. Denies fevers, chills, chest pain, cough, abdominal pain, nausea, vomiting, rash.  Per d/w daughter, ALS diagnosed 2020. Has recently begun to progress rapidly. Patient was able to ambulate one week prior to presentation using rolling walker. Now patient has been bed bound, too weak to ambulate. Pt also with worsening dysphagia per daughter. Family requesting social work/ case management assistance in helping coordinate safe discharge and equipment to help take care of patient at home.

## 2022-09-20 NOTE — PROGRESS NOTE ADULT - PROBLEM SELECTOR PLAN 5
- d/w patient and son  - HCP copy in chart  - introduced palliative care and advance directives   - will follow

## 2022-09-20 NOTE — PHYSICAL THERAPY INITIAL EVALUATION ADULT - IMPAIRMENTS FOUND, PT EVAL
aerobic capacity/endurance/ergonomics and body mechanics/gait, locomotion, and balance/gross motor/muscle strength/poor safety awareness

## 2022-09-20 NOTE — OCCUPATIONAL THERAPY INITIAL EVALUATION ADULT - GENERAL OBSERVATIONS, REHAB EVAL
Pt received supine in bed with HOB elevated, +call bell, +bed alarm, +IV lock, +Damon Catheter, +tele leads, +3L O2 NC, in NAD. Pt left as received with all lines intact. Pt's son + at pt's bedside.

## 2022-09-21 NOTE — CHART NOTE - NSCHARTNOTEFT_GEN_A_CORE
NUTRITION SUPPORT TEAM  -  CONSULT NOTE     ADMISSION HPI:  72-year-old female with PMHx of ALS, DM, HTN, HLD, recent covid presents with shortness of breath x1 day.  Patient reportedly 86% on room air at home. No other complaints except for LE swelling and pain at the buttocks. Denies fevers, chills, chest pain, cough, abdominal pain, nausea, vomiting, rash.  Per d/w daughter, ALS diagnosed 2020. Has recently begun to progress rapidly. Patient was able to ambulate one week prior to presentation using rolling walker. Now patient has been bed bound, too weak to ambulate. Pt also with worsening dysphagia per daughter. Family requesting social work/ case management assistance in helping coordinate safe discharge and equipment to help take care of patient at home.     in the ED,pt's VS T(C): 36.4 -36.9  HR:  (67 - 98)  BP: (106/71 - 135/72)  RR:  (17 - 18)  SpO2:  (97% - 99%); now on NRB   labs done showed no wbc, anemia 10.2 Hb, INR 0.96, Na 149, bicarb 36, VBG with hypercapnia, troponin 0.08  CT chest with small RLL pulmonary embolus w/o RHS and bibasilar patchy consolidative opacities   pt received lovenox, rocephin azithromycin  pt is admitted for workup/management PE/ worsening ALS   (19 Sep 2022 06:25)      NUTRITION SUPPORT NOTE:        REVIEW OF SYSTEMS:  Negative except as noted above.     PAST MEDICAL/SURGICAL HISTORY:   Amyotrophic lateral sclerosis (ALS)    ALLERGIES:  No Known Allergies      VITALS:  T(F): 96.3 (09-21 @ 05:06), Max: 96.3 (09-21 @ 05:06)  HR: 85 (09-21 @ 05:06) (85 - 85)  BP: 129/86 (09-21 @ 05:06) (129/86 - 129/86)  RR: 18 (09-21 @ 05:06) (18 - 18)  SpO2: --    HEIGHT/WEIGHT/BMI:     Weight (kg): 39.5 (09-20)    PHYSICAL EXAM:   GENERAL: NAD, well-groomed, well-developed  HEENT: Moist mucous membranes, Good dentition, No lesions  ABDOMEN: Soft, Nontender, Nondistended  EXTREMITIES:  No clubbing, cyanosis, or edema  SKIN: warm and well perfused; No obvious rashes or lesions  IV ACCESS:   ENTERAL ACCESS:     I/Os:     09-20-22 @ 07:01  -  09-21-22 @ 07:00  --------------------------------------------------------  IN:  Total IN: 0 mL    OUT:    Indwelling Catheter - Urethral (mL): 1200 mL  Total OUT: 1200 mL    Total NET: -1200 mL    STANDING MEDICATIONS:   acetaminophen     Tablet .. 650 milliGRAM(s) Oral every 6 hours PRN  aluminum hydroxide/magnesium hydroxide/simethicone Suspension 30 milliLiter(s) Oral every 4 hours PRN  ampicillin/sulbactam  IVPB      ampicillin/sulbactam  IVPB 1.5 Gram(s) IV Intermittent every 6 hours  atorvastatin Oral Tab/Cap - Peds 10 milliGRAM(s) Oral daily  chlorhexidine 2% Cloths 1 Application(s) Topical <User Schedule>  dextrose 5%. 1000 milliLiter(s) IV Continuous <Continuous>  dextrose 5%. 1000 milliLiter(s) IV Continuous <Continuous>  dextrose 50% Injectable 25 Gram(s) IV Push once  dextrose 50% Injectable 12.5 Gram(s) IV Push once  dextrose 50% Injectable 25 Gram(s) IV Push once  dextrose Oral Gel 15 Gram(s) Oral once PRN  enoxaparin Injectable 40 milliGRAM(s) SubCutaneous every 12 hours  glucagon  Injectable 1 milliGRAM(s) IntraMuscular once  hydrOXYzine hydrochloride 25 milliGRAM(s) Oral four times a day PRN  insulin lispro (ADMELOG) corrective regimen sliding scale   SubCutaneous three times a day before meals  losartan 25 milliGRAM(s) Oral daily  melatonin 3 milliGRAM(s) Oral at bedtime PRN  ondansetron Injectable 4 milliGRAM(s) IV Push every 8 hours PRN  riluzole 50 milliGRAM(s) Oral two times a day  scopolamine 1 mG/72 Hr(s) Patch 1 Patch Transdermal every 72 hours      LABS:                         10.0   7.85  )-----------( 125      ( 19 Sep 2022 12:06 )             30.5     149<H>  |  104  |  27<H>  ----------------------------<  86          (09-19-22 @ 12:06)  3.1<L>   |  34<H>  |  0.7    Ca    9.0          (09-19-22 @ 12:06)  Mg     1.8         (09-19-22 @ 12:06)    TPro  5.4<L>  /  Alb  4.0  /  TBili  0.9  /  DBili  x   /  AST  41  /  ALT  30  /  AlkPhos  95       09-19-22 @ 12:06    Blood Glucose (Past 24 hours):  63 mg/dL (09-21 @ 08:14)  147 mg/dL (09-20 @ 19:51)  132 mg/dL (09-20 @ 17:19)  117 mg/dL (09-20 @ 12:10)    DIET:   Diet, NPO after Midnight:      NPO Start Date: 21-Sep-2022,   NPO Start Time: 23:59 (09-21-22 @ 08:42) [Active]  Diet, Regular:   Consistent Carbohydrate {Evening Snack}  Pureed (PUREED) (09-19-22 @ 07:34) [Active]      ASSESSMENT/PLAN: NUTRITION SUPPORT TEAM  -  CONSULT NOTE     ADMISSION HPI:  72-year-old female with PMHx of ALS, DM, HTN, HLD, recent covid presents with shortness of breath x1 day.  Patient reportedly 86% on room air at home. No other complaints except for LE swelling and pain at the buttocks. Denies fevers, chills, chest pain, cough, abdominal pain, nausea, vomiting, rash.  Per d/w daughter, ALS diagnosed 2020. Has recently begun to progress rapidly. Patient was able to ambulate one week prior to presentation using rolling walker. Now patient has been bed bound, too weak to ambulate. Pt also with worsening dysphagia per daughter. Family requesting social work/ case management assistance in helping coordinate safe discharge and equipment to help take care of patient at home.     in the ED,pt's VS T(C): 36.4 -36.9  HR:  (67 - 98)  BP: (106/71 - 135/72)  RR:  (17 - 18)  SpO2:  (97% - 99%); now on NRB   labs done showed no wbc, anemia 10.2 Hb, INR 0.96, Na 149, bicarb 36, VBG with hypercapnia, troponin 0.08  CT chest with small RLL pulmonary embolus w/o RHS and bibasilar patchy consolidative opacities   pt received lovenox, rocephin azithromycin  pt is admitted for workup/management PE/ worsening ALS  (19 Sep 2022 06:25)    NUTRITION SUPPORT NOTE:    Pt tolerating minimal amount of PO diet, PEG planned. Refusing NGT placement    REVIEW OF SYSTEMS:  Negative except as noted above.     PAST MEDICAL/SURGICAL HISTORY:   Amyotrophic lateral sclerosis (ALS)    ALLERGIES:  No Known Allergies    VITALS:  T(F): 96.3 (09-21 @ 05:06), Max: 96.3 (09-21 @ 05:06)  HR: 85 (09-21 @ 05:06) (85 - 85)  BP: 129/86 (09-21 @ 05:06) (129/86 - 129/86)  RR: 18 (09-21 @ 05:06) (18 - 18)    HEIGHT/WEIGHT/BMI:   Weight (kg): 39.5 (09-20)    PHYSICAL EXAM:   GENERAL: NAD, well-groomed, well-developed  HEENT: Moist mucous membranes, Good dentition, No lesions  ABDOMEN: Soft, Nontender, Nondistended  EXTREMITIES:  No clubbing, cyanosis, or edema  SKIN: warm and well perfused; No obvious rashes or lesions  IV ACCESS: peripheral   ENTERAL ACCESS: none    I/Os:     09-20-22 @ 07:01  -  09-21-22 @ 07:00  --------------------------------------------------------  IN:  Total IN: 0 mL    OUT:    Indwelling Catheter - Urethral (mL): 1200 mL  Total OUT: 1200 mL    Total NET: -1200 mL    STANDING MEDICATIONS:   acetaminophen     Tablet .. 650 milliGRAM(s) Oral every 6 hours PRN  aluminum hydroxide/magnesium hydroxide/simethicone Suspension 30 milliLiter(s) Oral every 4 hours PRN  ampicillin/sulbactam  IVPB      ampicillin/sulbactam  IVPB 1.5 Gram(s) IV Intermittent every 6 hours  atorvastatin Oral Tab/Cap - Peds 10 milliGRAM(s) Oral daily  chlorhexidine 2% Cloths 1 Application(s) Topical <User Schedule>  dextrose 5%. 1000 milliLiter(s) IV Continuous <Continuous>  dextrose 5%. 1000 milliLiter(s) IV Continuous <Continuous>  dextrose 50% Injectable 25 Gram(s) IV Push once  dextrose 50% Injectable 12.5 Gram(s) IV Push once  dextrose 50% Injectable 25 Gram(s) IV Push once  dextrose Oral Gel 15 Gram(s) Oral once PRN  enoxaparin Injectable 40 milliGRAM(s) SubCutaneous every 12 hours  glucagon  Injectable 1 milliGRAM(s) IntraMuscular once  hydrOXYzine hydrochloride 25 milliGRAM(s) Oral four times a day PRN  insulin lispro (ADMELOG) corrective regimen sliding scale   SubCutaneous three times a day before meals  losartan 25 milliGRAM(s) Oral daily  melatonin 3 milliGRAM(s) Oral at bedtime PRN  ondansetron Injectable 4 milliGRAM(s) IV Push every 8 hours PRN  riluzole 50 milliGRAM(s) Oral two times a day  scopolamine 1 mG/72 Hr(s) Patch 1 Patch Transdermal every 72 hours    LABS:                         10.0   7.85  )-----------( 125      ( 19 Sep 2022 12:06 )             30.5     149<H>  |  104  |  27<H>  ----------------------------<  86          (09-19-22 @ 12:06)  3.1<L>   |  34<H>  |  0.7    Ca    9.0          (09-19-22 @ 12:06)  Mg     1.8         (09-19-22 @ 12:06)    TPro  5.4<L>  /  Alb  4.0  /  TBili  0.9  /  DBili  x   /  AST  41  /  ALT  30  /  AlkPhos  95       09-19-22 @ 12:06    Blood Glucose (Past 24 hours):  63 mg/dL (09-21 @ 08:14)  147 mg/dL (09-20 @ 19:51)  132 mg/dL (09-20 @ 17:19)  117 mg/dL (09-20 @ 12:10)    DIET:   Diet, NPO after Midnight:      NPO Start Date: 21-Sep-2022,   NPO Start Time: 23:59 (09-21-22 @ 08:42) [Active]  Diet, Regular:   Consistent Carbohydrate {Evening Snack}  Pureed (PUREED) (09-19-22 @ 07:34) [Active]    ASSESSMENT  72-year-old female with PMHx of ALS, DM, HTN, HLD, recent covid presents with shortness of breath x1 day.  Patient reportedly 86% on room air at home. No other complaints except for LE swelling and pain at the buttocks. Denies fevers, chills, chest pain, cough, abdominal pain, nausea, vomiting, rash.    - acute hypoxic respiratory failure  - chronic hypercapnia secondary to ALS  - RLL pulmonary embolus/No RHS  - hypokalemia     PLAN  - obtain height and document   - supplement K+  - PO diet as tolerated  - add glucerna shake 8oz bid  - add MV with minerals 1 tab daily  - add in phos and 25-oh vitamin D level to next blood draw  - consider NGT placement for supplemental feeds for now  - once enteral tube in place start 120ml Jevity 1.2 1 hour after each attempted PO meal to start  - will need home care arrangements for home PEG feeds IF tube is placed  - will follow NUTRITION SUPPORT TEAM  -  CONSULT NOTE     ADMISSION HPI:  72-year-old female with PMHx of ALS, DM, HTN, HLD, recent covid presents with shortness of breath x1 day.  Patient reportedly 86% on room air at home. No other complaints except for LE swelling and pain at the buttocks. Denies fevers, chills, chest pain, cough, abdominal pain, nausea, vomiting, rash.  Per d/w daughter, ALS diagnosed 2020. Has recently begun to progress rapidly. Patient was able to ambulate one week prior to presentation using rolling walker. Now patient has been bed bound, too weak to ambulate. Pt also with worsening dysphagia per daughter. Family requesting social work/ case management assistance in helping coordinate safe discharge and equipment to help take care of patient at home.     in the ED,pt's VS T(C): 36.4 -36.9  HR:  (67 - 98)  BP: (106/71 - 135/72)  RR:  (17 - 18)  SpO2:  (97% - 99%); now on NRB   labs done showed no wbc, anemia 10.2 Hb, INR 0.96, Na 149, bicarb 36, VBG with hypercapnia, troponin 0.08  CT chest with small RLL pulmonary embolus w/o RHS and bibasilar patchy consolidative opacities   pt received lovenox, rocephin azithromycin  pt is admitted for workup/management PE/ worsening ALS  (19 Sep 2022 06:25)    NUTRITION SUPPORT NOTE:    Pt tolerating minimal amount of PO diet, PEG planned. Refusing NGT placement    REVIEW OF SYSTEMS:  Negative except as noted above.     PAST MEDICAL/SURGICAL HISTORY:   Amyotrophic lateral sclerosis (ALS)    ALLERGIES:  No Known Allergies    VITALS:  T(F): 96.3 (09-21 @ 05:06), Max: 96.3 (09-21 @ 05:06)  HR: 85 (09-21 @ 05:06) (85 - 85)  BP: 129/86 (09-21 @ 05:06) (129/86 - 129/86)  RR: 18 (09-21 @ 05:06) (18 - 18)    HEIGHT/WEIGHT/BMI:   Weight (kg): 39.5 (09-20)    PHYSICAL EXAM:   GENERAL: NAD, well-groomed, well-developed, nonverbal  HEENT: Moist mucous membranes, Good dentition, No lesions  ABDOMEN: Soft, Nontender, Nondistended  EXTREMITIES:  No clubbing, cyanosis, or edema  SKIN: warm and well perfused; No obvious rashes, sacral skin tear  IV ACCESS: peripheral   ENTERAL ACCESS: none    I/Os:     09-20-22 @ 07:01  -  09-21-22 @ 07:00  --------------------------------------------------------  IN:  Total IN: 0 mL    OUT:    Indwelling Catheter - Urethral (mL): 1200 mL  Total OUT: 1200 mL    Total NET: -1200 mL    STANDING MEDICATIONS:   acetaminophen     Tablet .. 650 milliGRAM(s) Oral every 6 hours PRN  aluminum hydroxide/magnesium hydroxide/simethicone Suspension 30 milliLiter(s) Oral every 4 hours PRN  ampicillin/sulbactam  IVPB      ampicillin/sulbactam  IVPB 1.5 Gram(s) IV Intermittent every 6 hours  atorvastatin Oral Tab/Cap - Peds 10 milliGRAM(s) Oral daily  chlorhexidine 2% Cloths 1 Application(s) Topical <User Schedule>  dextrose 5%. 1000 milliLiter(s) IV Continuous <Continuous>  dextrose 5%. 1000 milliLiter(s) IV Continuous <Continuous>  dextrose 50% Injectable 25 Gram(s) IV Push once  dextrose 50% Injectable 12.5 Gram(s) IV Push once  dextrose 50% Injectable 25 Gram(s) IV Push once  dextrose Oral Gel 15 Gram(s) Oral once PRN  enoxaparin Injectable 40 milliGRAM(s) SubCutaneous every 12 hours  glucagon  Injectable 1 milliGRAM(s) IntraMuscular once  hydrOXYzine hydrochloride 25 milliGRAM(s) Oral four times a day PRN  insulin lispro (ADMELOG) corrective regimen sliding scale   SubCutaneous three times a day before meals  losartan 25 milliGRAM(s) Oral daily  melatonin 3 milliGRAM(s) Oral at bedtime PRN  ondansetron Injectable 4 milliGRAM(s) IV Push every 8 hours PRN  riluzole 50 milliGRAM(s) Oral two times a day  scopolamine 1 mG/72 Hr(s) Patch 1 Patch Transdermal every 72 hours    LABS:                         10.0   7.85  )-----------( 125       19 Sep 2022 12:06 )             30.5     149<H>  |  104  |  27<H>  ----------------------------<  86          (09-19-22 @ 12:06)  3.1<L>   |  34<H>  |  0.7    Ca    9.0          (09-19-22 @ 12:06)  Mg     1.8         (09-19-22 @ 12:06)    TPro  5.4<L>  /  Alb  4.0  /  TBili  0.9  /  DBili  x   /  AST  41  /  ALT  30  /  AlkPhos  95       09-19-22 @ 12:06    Blood Glucose (Past 24 hours):  63 mg/dL (09-21 @ 08:14)  147 mg/dL (09-20 @ 19:51)  132 mg/dL (09-20 @ 17:19)  117 mg/dL (09-20 @ 12:10)    DIET:   Diet, NPO after Midnight:      NPO Start Date: 21-Sep-2022,   NPO Start Time: 23:59 (09-21-22 @ 08:42) [Active]  Diet, Regular:   Consistent Carbohydrate {Evening Snack}  Pureed (PUREED) (09-19-22 @ 07:34) [Active]    ASSESSMENT  72-year-old female with PMHx of ALS, DM, HTN, HLD, recent covid presents with shortness of breath x1 day.  Patient reportedly 86% on room air at home. No other complaints except for LE swelling and pain at the buttocks. Denies fevers, chills, chest pain, cough, abdominal pain, nausea, vomiting, rash.    - acute hypoxic respiratory failure  - chronic hypercapnia secondary to ALS  - RLL pulmonary embolus/No RHS  - hypokalemia     PLAN  - obtain height and document   - supplement K+  - PO diet as tolerated  - add glucerna shake 8oz bid  - add MV with minerals 1 tab daily  - add in phos and 25-oh vitamin D level to next blood draw  - consider NGT placement for supplemental feeds for now  - once enteral tube in place start 120ml Jevity 1.2 1 hour after each attempted PO meal to start  - will need home care arrangements for home PEG feeds IF tube is placed  - will follow NUTRITION SUPPORT TEAM  -  CONSULT NOTE     ADMISSION HPI:  72-year-old female with PMHx of ALS, DM, HTN, HLD, recent covid presents with shortness of breath x1 day.  Patient reportedly 86% on room air at home. No other complaints except for LE swelling and pain at the buttocks. Denies fevers, chills, chest pain, cough, abdominal pain, nausea, vomiting, rash.  Per d/w daughter, ALS diagnosed 2020. Has recently begun to progress rapidly. Patient was able to ambulate one week prior to presentation using rolling walker. Now patient has been bed bound, too weak to ambulate. Pt also with worsening dysphagia per daughter. Family requesting social work/ case management assistance in helping coordinate safe discharge and equipment to help take care of patient at home.     in the ED,pt's VS T(C): 36.4 -36.9  HR:  (67 - 98)  BP: (106/71 - 135/72)  RR:  (17 - 18)  SpO2:  (97% - 99%); now on NRB   labs done showed no wbc, anemia 10.2 Hb, INR 0.96, Na 149, bicarb 36, VBG with hypercapnia, troponin 0.08  CT chest with small RLL pulmonary embolus w/o RHS and bibasilar patchy consolidative opacities   pt received lovenox, rocephin azithromycin  pt is admitted for workup/management PE/ worsening ALS  (19 Sep 2022 06:25)    NUTRITION SUPPORT NOTE:    Pt tolerating minimal amount of PO diet, PEG planned. Refusing NGT placement    REVIEW OF SYSTEMS:  Negative except as noted above.     PAST MEDICAL/SURGICAL HISTORY:   Amyotrophic lateral sclerosis (ALS)    ALLERGIES:  No Known Allergies    VITALS:  T(F): 96.3 (09-21 @ 05:06), Max: 96.3 (09-21 @ 05:06)  HR: 85 (09-21 @ 05:06) (85 - 85)  BP: 129/86 (09-21 @ 05:06) (129/86 - 129/86)  RR: 18 (09-21 @ 05:06) (18 - 18)    HEIGHT/WEIGHT/BMI:   Weight (kg): 39.5 (09-20)    PHYSICAL EXAM:   GENERAL: NAD, well-groomed, well-developed, nonverbal  HEENT: Moist mucous membranes, Good dentition, No lesions  ABDOMEN: Soft, Nontender, Nondistended  EXTREMITIES:  No clubbing, cyanosis, or edema  SKIN: warm and well perfused; No obvious rashes, sacral skin tear  IV ACCESS: peripheral   ENTERAL ACCESS: none    I/Os:     09-20-22 @ 07:01  -  09-21-22 @ 07:00  --------------------------------------------------------  IN:  Total IN: 0 mL    OUT:    Indwelling Catheter - Urethral (mL): 1200 mL  Total OUT: 1200 mL    Total NET: -1200 mL    STANDING MEDICATIONS:   acetaminophen     Tablet .. 650 milliGRAM(s) Oral every 6 hours PRN  aluminum hydroxide/magnesium hydroxide/simethicone Suspension 30 milliLiter(s) Oral every 4 hours PRN  ampicillin/sulbactam  IVPB      ampicillin/sulbactam  IVPB 1.5 Gram(s) IV Intermittent every 6 hours  atorvastatin Oral Tab/Cap - Peds 10 milliGRAM(s) Oral daily  chlorhexidine 2% Cloths 1 Application(s) Topical <User Schedule>  dextrose 5%. 1000 milliLiter(s) IV Continuous <Continuous>  dextrose 5%. 1000 milliLiter(s) IV Continuous <Continuous>  dextrose 50% Injectable 25 Gram(s) IV Push once  dextrose 50% Injectable 12.5 Gram(s) IV Push once  dextrose 50% Injectable 25 Gram(s) IV Push once  dextrose Oral Gel 15 Gram(s) Oral once PRN  enoxaparin Injectable 40 milliGRAM(s) SubCutaneous every 12 hours  glucagon  Injectable 1 milliGRAM(s) IntraMuscular once  hydrOXYzine hydrochloride 25 milliGRAM(s) Oral four times a day PRN  insulin lispro (ADMELOG) corrective regimen sliding scale   SubCutaneous three times a day before meals  losartan 25 milliGRAM(s) Oral daily  melatonin 3 milliGRAM(s) Oral at bedtime PRN  ondansetron Injectable 4 milliGRAM(s) IV Push every 8 hours PRN  riluzole 50 milliGRAM(s) Oral two times a day  scopolamine 1 mG/72 Hr(s) Patch 1 Patch Transdermal every 72 hours    LABS:                         10.0   7.85  )-----------( 125       19 Sep 2022 12:06 )             30.5     149<H>  |  104  |  27<H>  ----------------------------<  86          (09-19-22 @ 12:06)  3.1<L>   |  34<H>  |  0.7    Ca    9.0          (09-19-22 @ 12:06)  Mg     1.8         (09-19-22 @ 12:06)    TPro  5.4<L>  /  Alb  4.0  /  TBili  0.9  /  DBili  x   /  AST  41  /  ALT  30  /  AlkPhos  95       09-19-22 @ 12:06    Blood Glucose (Past 24 hours):  63 mg/dL (09-21 @ 08:14)  147 mg/dL (09-20 @ 19:51)  132 mg/dL (09-20 @ 17:19)  117 mg/dL (09-20 @ 12:10)    DIET:   Diet, NPO after Midnight:      NPO Start Date: 21-Sep-2022,   NPO Start Time: 23:59 (09-21-22 @ 08:42) [Active]  Diet, Regular:   Consistent Carbohydrate {Evening Snack}  Pureed (PUREED) (09-19-22 @ 07:34) [Active]    ASSESSMENT  72-year-old female with PMHx of ALS, DM, HTN, HLD, recent covid presents with shortness of breath x1 day.  Patient reportedly 86% on room air at home. No other complaints except for LE swelling and pain at the buttocks. Denies fevers, chills, chest pain, cough, abdominal pain, nausea, vomiting, rash.    - acute hypoxic respiratory failure  - chronic hypercapnia secondary to ALS  - RLL pulmonary embolus/No RHS  - hypokalemia     PLAN  - obtain height and document   - supplement K+  - PO diet as tolerated  - add glucerna shake 8oz bid  - check yaw cts X 1 day (today please)  - add MV with minerals 1 tab daily  - add in phos and 25-oh vitamin D level to next blood draw  - consider NGT placement for supplemental feeds for now  - once enteral tube in place start 120ml Jevity 1.2 1 hour after each attempted PO meal to start  - will need home care arrangements for home PEG feeds IF tube is placed  - will follow NUTRITION SUPPORT TEAM  -  CONSULT NOTE     ADMISSION HPI:  72-year-old female with PMHx of ALS, DM, HTN, HLD, recent covid presents with shortness of breath x1 day.  Patient reportedly 86% on room air at home. No other complaints except for LE swelling and pain at the buttocks. Denies fevers, chills, chest pain, cough, abdominal pain, nausea, vomiting, rash.  Per d/w daughter, ALS diagnosed 2020. Has recently begun to progress rapidly. Patient was able to ambulate one week prior to presentation using rolling walker. Now patient has been bed bound, too weak to ambulate. Pt also with worsening dysphagia per daughter. Family requesting social work/ case management assistance in helping coordinate safe discharge and equipment to help take care of patient at home.     in the ED,pt's VS T(C): 36.4 -36.9  HR:  (67 - 98)  BP: (106/71 - 135/72)  RR:  (17 - 18)  SpO2:  (97% - 99%); now on NRB   labs done showed no wbc, anemia 10.2 Hb, INR 0.96, Na 149, bicarb 36, VBG with hypercapnia, troponin 0.08  CT chest with small RLL pulmonary embolus w/o RHS and bibasilar patchy consolidative opacities   pt received lovenox, rocephin azithromycin  pt is admitted for workup/management PE/ worsening ALS  (19 Sep 2022 06:25)    NUTRITION SUPPORT NOTE:    Pt tolerating minimal amount of PO diet, PEG planned. Refusing NGT placement    REVIEW OF SYSTEMS:  Negative except as noted above.     PAST MEDICAL/SURGICAL HISTORY:   Amyotrophic lateral sclerosis (ALS)  DM  No Known Allergies    VITALS:  T(F): 96.3 (09-21 @ 05:06), Max: 96.3 (09-21 @ 05:06)  HR: 85 (09-21 @ 05:06) (85 - 85)  BP: 129/86 (09-21 @ 05:06) (129/86 - 129/86)  RR: 18 (09-21 @ 05:06) (18 - 18)    HEIGHT/WEIGHT/BMI:   Weight (kg): 39.5 (09-20)    PHYSICAL EXAM:   GENERAL: NAD, well-groomed, well-developed, nonverbal  HEENT: Moist mucous membranes, Good dentition, No lesions  ABDOMEN: Soft, Nontender, Nondistended  EXTREMITIES:  No clubbing, cyanosis, or edema  SKIN: warm and well perfused; No obvious rashes, sacral skin tear  IV ACCESS: peripheral   ENTERAL ACCESS: none    I/Os:     09-20-22 @ 07:01  -  09-21-22 @ 07:00  --------------------------------------------------------  IN:  Total IN: 0 mL    OUT:    Indwelling Catheter - Urethral (mL): 1200 mL  Total OUT: 1200 mL    Total NET: -1200 mL    STANDING MEDICATIONS:   acetaminophen     Tablet .. 650 milliGRAM(s) Oral every 6 hours PRN  aluminum hydroxide/magnesium hydroxide/simethicone Suspension 30 milliLiter(s) Oral every 4 hours PRN  ampicillin/sulbactam  IVPB      ampicillin/sulbactam  IVPB 1.5 Gram(s) IV Intermittent every 6 hours  atorvastatin Oral Tab/Cap - Peds 10 milliGRAM(s) Oral daily  chlorhexidine 2% Cloths 1 Application(s) Topical <User Schedule>  dextrose Oral Gel 15 Gram(s) Oral once PRN  enoxaparin Injectable 40 milliGRAM(s) SubCutaneous every 12 hours  hydrOXYzine hydrochloride 25 milliGRAM(s) Oral four times a day PRN  insulin lispro (ADMELOG) corrective regimen sliding scale   SubCutaneous three times a day before meals  losartan 25 milliGRAM(s) Oral daily  melatonin 3 milliGRAM(s) Oral at bedtime PRN  ondansetron Injectable 4 milliGRAM(s) IV Push every 8 hours PRN  riluzole 50 milliGRAM(s) Oral two times a day  scopolamine 1 mG/72 Hr(s) Patch 1 Patch Transdermal every 72 hours    LABS:                         10.0   7.85  )-----------( 125      ( 19 Sep 2022 12:06 )             30.5     149<H>  |  104  |  27<H>  ----------------------------<  86          (09-19-22 @ 12:06)  3.1<L>   |  34<H>  |  0.7    Ca    9.0          (09-19-22 @ 12:06)  Mg     1.8         (09-19-22 @ 12:06)    TPro  5.4<L>  /  Alb  4.0  /  TBili  0.9  /  DBili  x   /  AST  41  /  ALT  30  /  AlkPhos  95       09-19-22 @ 12:06    Blood Glucose (Past 24 hours):  63 mg/dL (09-21 @ 08:14)  147 mg/dL (09-20 @ 19:51)  132 mg/dL (09-20 @ 17:19)  117 mg/dL (09-20 @ 12:10)    DIET:   Diet, NPO after Midnight:      NPO Start Date: 21-Sep-2022,   NPO Start Time: 23:59 (09-21-22 @ 08:42) [Active]  Diet, Regular:   Consistent Carbohydrate {Evening Snack}  Pureed (PUREED) (09-19-22 @ 07:34) [Active]    ASSESSMENT  72-year-old female with PMHx of ALS, DM, HTN, HLD, recent covid presents with shortness of breath x1 day.  Patient reportedly 86% on room air at home. No other complaints except for LE swelling and pain at the buttocks. Denies fevers, chills, chest pain, cough, abdominal pain, nausea, vomiting, rash.    - acute hypoxic respiratory failure, +PE  - chronic hypercapnia secondary to ALS  - RLL pulmonary embolus/No RHS  - hypokalemia   - DM    PLAN  - obtain height and document - then calc BMI  - supplement K+  - PO diet as tolerated  - add Glucerna shake 8oz bid  - check yaw cts X 1 day (today please)  - add MV with minerals 1 tab daily  - add in phos and 25-oh vitamin D level to next blood draw  - consider NGT placement for supplemental feeds for now  - once enteral tube in place start 120ml Jevity 1.2 1 hour after each attempted PO meal to start  - will need home care arrangements/ vendor for home PEG feeds IF tube is placed  ** d/w resident and with Pulm fellow:  per Pulm, no procedures for 6 weeks, due to PE. pt declines NG for feeding for that interim. Will discuss further whether IR has "window" to place GT, and what has to be done regarding anti-coagulation for such placement.

## 2022-09-21 NOTE — PROGRESS NOTE ADULT - ASSESSMENT
72-year-old female with PMHx of ALS, DM, HTN, HLD, recent covid presents with shortness of breath x1 day.  Patient reportedly 86% on room air at home. No other complaints except for LE swelling and pain at the buttocks. Denies fevers, chills, chest pain, cough, abdominal pain, nausea, vomiting, rash.  Per d/w daughter, ALS diagnosed 2020. Has recently begun to progress rapidly. Patient was able to ambulate one week prior to presentation using rolling walker. Now patient has been bed bound, too weak to ambulate. Pt also with worsening dysphagia per daughter. Family requesting social work/ case management assistance in helping coordinate safe discharge and equipment to help take care of patient at home.     in the ED,pt's VS T(C): 36.4 -36.9  HR:  (67 - 98)  BP: (106/71 - 135/72)  RR:  (17 - 18)  SpO2:  (97% - 99%); now on NRB   labs done showed no wbc, anemia 10.2 Hb, INR 0.96, Na 149, bicarb 36, VBG with hypercapnia, troponin 0.08  CT chest with small RLL pulmonary embolus w/o RHS and bibasilar patchy consolidative opacities   pt received lovenox, rocephin azithromycin  pt is admitted for workup/management PE/ worsening ALS   (19 Sep 2022 06:25)    Pt here with progressive ALS, having dysphagia and suspected micro-aspirations, and found to have new RLL PE, on therapeutic lovenox. GI was consulted for evaluation of PEG tube placement. Pt was diagnosed with ALS in 2020, which became much more rapidly progressive over the past few months. One of the initial symptoms was dysphagia and trouble talking, and it has gotten progressively worse; now only tolerating Pureed diet with thickened liquids, and having trouble mostly with the swallowing phase; associated with occasional coughing especially after un-thickened water. Discussed the option of PEG tube placement with patient and son at bedside. Family is aware of the poor prognosis and the progressive nature of ALS, and agree that she does have trouble swallowing, and mostly eating enough to maintain her caloric needs. The patient is agreeable to a PEG tube, but expressed her wish to be able to enjoy PO intake for as long as possible, regardless of consistency and even if she gets a PEG tube. She is okay with getting a PEG tube and supplementing feeds with comfort PO feeds, with recommendations from SLP to minimize risk of aspirations, but understands that she will need supplemental feedings to keep up her caloric needs.     #Malnutrition, dysphagia due to ALS - PEG tube evaluation  - Diagnosed with ALS in 2020, has been having progressive dysphagia, most notably rapidly progressive for the past few months  - SLP eval: Pureed diet with mildly thickened liquids; with alternate means of nutrition  - Patient agreeable to PEG placement; aware of risks and benefits, including risk of aspiration with feeding despite PEG placement  - Discussion had with patient and daughter, and previously with Son Jorge A (HCP) who expressed understanding    Plan  - As per pulm, pt here with acute PE on therapeutic anticoagulation, and is high-risk for elective procedures including PEG tube placement  - Pt and family aware of risks associated with current feeds, including aspiration and malnutrition  - Not stable for PEG placement currently  - Re-call GI as needed

## 2022-09-21 NOTE — PROGRESS NOTE ADULT - ASSESSMENT
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1% risk of in-hospital post-op pulmonary complications, patient deemed high risk for planned procedure    PLAN:    Given patient's acute VTE, would recommend to postpone non-urgent procedures for 3 months, may consider temporary IVC filter placement as AC needs to be held for 24 hours post PEG  HOB @ 45 degrees  Aspiration precautions  Wean O2 as tolerated  NIV during sleep, will benefit from NIV at home  Avoid overload  on therapeutic LMWH   LE duplex negative from 9/19/22  Complete Unasyn course   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      PLAN:    [facility].    HOB @ 45 degrees  Aspiration precautions  Wean O2 as tolerated  NIV during sleep, will benefit from NIV at home  Avoid overload  on therapeutic LMWH   Will discuss with primary team   LE duplex negative from 9/19/22  Complete Unasyn course

## 2022-09-21 NOTE — DISCHARGE NOTE NURSING/CASE MANAGEMENT/SOCIAL WORK - PATIENT PORTAL LINK FT
You can access the FollowMyHealth Patient Portal offered by  by registering at the following website: [email or web address]. By joining [employer]’s FollowMyHealth portal, you will also be able to view your health information using other applications (apps) compatible with our system.

## 2022-09-21 NOTE — PROGRESS NOTE ADULT - ASSESSMENT
72-year-old female with PMHx of ALS, DM, HTN, HLD, recent covid presents with shortness of breath x1 day.  Patient reportedly 86% on room air at home. No other complaints except for LE swelling and pain at the buttocks. Denies fevers, chills, chest pain, cough, abdominal pain, nausea, vomiting, rash.    #acute hypoxic respiratory failure  #chronic hypercapnia secondary to ALS  #RLL pulmonary embolus/ No RHS  CT PE: Small RLL PE; Patchy LLL consolidative opacities  Unlikely pneumonia, no leukocytosis, fevers  No RV dysfunction on echo  Procalcitonin .26  On 3L NC O2 saturating 96-97%  - Cont therapeutic Lovenox --> on discharge, Eliquis 10 BID x7d, then eliquis 5BID (copay 40$ at [facility])  - unasyn 1.5 q6h for now, f/u procalcitonin  - Wean off O2 as tolerated, will likely need home O2    #ALS  - f/u neurology recommendations  - Cont home meds  - Palliative care on board  - Puree diet w/ mildly thick liquids per s/s. PEG recommended to supplement  - GI on board for PEG placement, but pulm says unstable due to ac, talking to ir to see if they can do it. as per dr stauffer if no peg now, conisder picc for tpn  - Pulm eval for risk stratification/optimization give recent PE. Pt will need to be off   - CM f/u for homecare    #HTN/HLD  - Cont losartan 25mg qD  - Cont atorvastatin 10mg qD    #DM2  - ISS    DVT PPX, Lovenox

## 2022-09-21 NOTE — CONSULT NOTE ADULT - SUBJECTIVE AND OBJECTIVE BOX
INTERVENTIONAL RADIOLOGY CONSULT:     Procedure Requested: G tube placement     HPI:  72-year-old female with PMHx of ALS, DM, HTN, HLD, recent covid presents with shortness of breath x1 day.  Patient reportedly 86% on room air at home. No other complaints except for LE swelling and pain at the buttocks. Denies fevers, chills, chest pain, cough, abdominal pain, nausea, vomiting, rash.  Per d/w daughter, ALS diagnosed 2020. Has recently begun to progress rapidly. Patient was able to ambulate one week prior to presentation using rolling walker. Now patient has been bed bound, too weak to ambulate. Pt also with worsening dysphagia per daughter. Family requesting social work/ case management assistance in helping coordinate safe discharge and equipment to help take care of patient at home.     in the ED,pt's VS T(C): 36.4 -36.9  HR:  (67 - 98)  BP: (106/71 - 135/72)  RR:  (17 - 18)  SpO2:  (97% - 99%); now on NRB   labs done showed no wbc, anemia 10.2 Hb, INR 0.96, Na 149, bicarb 36, VBG with hypercapnia, troponin 0.08  CT chest with small RLL pulmonary embolus w/o RHS and bibasilar patchy consolidative opacities   pt received lovenox, rocephin azithromycin  pt is admitted for workup/management PE/ worsening ALS   (19 Sep 2022 06:25)      PAST MEDICAL & SURGICAL HISTORY:  Amyotrophic lateral sclerosis (ALS)          MEDICATIONS  (STANDING):  ampicillin/sulbactam  IVPB      ampicillin/sulbactam  IVPB 1.5 Gram(s) IV Intermittent every 6 hours  atorvastatin Oral Tab/Cap - Peds 10 milliGRAM(s) Oral daily  chlorhexidine 2% Cloths 1 Application(s) Topical <User Schedule>  dextrose 5%. 1000 milliLiter(s) (50 mL/Hr) IV Continuous <Continuous>  dextrose 5%. 1000 milliLiter(s) (100 mL/Hr) IV Continuous <Continuous>  dextrose 50% Injectable 25 Gram(s) IV Push once  dextrose 50% Injectable 12.5 Gram(s) IV Push once  dextrose 50% Injectable 25 Gram(s) IV Push once  enoxaparin Injectable 40 milliGRAM(s) SubCutaneous every 12 hours  glucagon  Injectable 1 milliGRAM(s) IntraMuscular once  insulin lispro (ADMELOG) corrective regimen sliding scale   SubCutaneous three times a day before meals  losartan 25 milliGRAM(s) Oral daily  riluzole 50 milliGRAM(s) Oral two times a day  scopolamine 1 mG/72 Hr(s) Patch 1 Patch Transdermal every 72 hours    MEDICATIONS  (PRN):  acetaminophen     Tablet .. 650 milliGRAM(s) Oral every 6 hours PRN Temp greater or equal to 38C (100.4F), Mild Pain (1 - 3)  ALPRAZolam 0.25 milliGRAM(s) Oral two times a day PRN anxiety  aluminum hydroxide/magnesium hydroxide/simethicone Suspension 30 milliLiter(s) Oral every 4 hours PRN Dyspepsia  dextrose Oral Gel 15 Gram(s) Oral once PRN Blood Glucose LESS THAN 70 milliGRAM(s)/deciliter  hydrOXYzine hydrochloride 25 milliGRAM(s) Oral four times a day PRN Anxiety  melatonin 3 milliGRAM(s) Oral at bedtime PRN Insomnia  ondansetron Injectable 4 milliGRAM(s) IV Push every 8 hours PRN Nausea and/or Vomiting      Allergies    No Known Allergies    Intolerances    Physical Exam:   Vital Signs Last 24 Hrs  T(C): 35.7 (21 Sep 2022 05:06), Max: 36.7 (20 Sep 2022 19:57)  T(F): 96.3 (21 Sep 2022 05:06), Max: 98 (20 Sep 2022 19:57)  HR: 85 (21 Sep 2022 05:06) (85 - 97)  BP: 129/86 (21 Sep 2022 05:06) (120/60 - 129/86)  BP(mean): --  RR: 18 (21 Sep 2022 05:06) (18 - 18)  SpO2: 96% (20 Sep 2022 21:06) (96% - 96%)    Parameters below as of 20 Sep 2022 21:06  Patient On (Oxygen Delivery Method): nasal cannula  O2 Flow (L/min): 2      General:     Lungs:    Cardiovascular:     Abdomen:    Extremities:    Musculoskeletal:    Neuro/Psych:        Labs:       Pertinent labs:    Radiology & Additional Studies:   Radiology imaging reviewed.       ASSESSMENT/ PLAN:   72-year-old female with PMHx of ALS, DM, HTN, HLD, recent covid presents with shortness of breath x1 day.  Patient reportedly 86% on room air at home. No other complaints except for LE swelling and pain at the buttocks. Denies fevers, chills, chest pain, cough, abdominal pain, nausea, vomiting, rash. Per d/w daughter, ALS diagnosed 2020. Has recently begun to progress rapidly. Patient was able to ambulate one week prior to presentation using rolling walker. Now patient has been bed bound, too weak to ambulate. Pt also with worsening dysphagia per daughter. Family requesting social work/ case management assistance in helping coordinate safe discharge and equipment to help take care of patient at home. Nutrition recommending G tube placement. Surgery was consulted but denied the case as pulmonary documented due to PE and respiratory status patient is not stable for an elective procedure.  - due to IR procedure requiring sedation, would not recommend G tube placement at this time  - no IR intervention warranted   - case discussed with resident, he will re-evaluate with Dr. Grant and pulmonary     Thank you for the courtesy of this consult, please call [phone] with any further questions.

## 2022-09-21 NOTE — CHART NOTE - NSCHARTNOTEFT_GEN_A_CORE
visited patient earlier today. patient encountered resting comfortably in bed. she denied any pain or discomfort. her son Jorge A at bedside. patient and son still thinking about code status. all questions answered. support rendered. [phone]

## 2022-09-21 NOTE — CONSULT NOTE ADULT - CONSULT REASON
h/o ALS
G tube placement
Potential PEG placement
ARF
family requesting help/ discussion of options of End of life/?hospice care in pt with progressing ALS

## 2022-09-21 NOTE — PROGRESS NOTE ADULT - SUBJECTIVE AND OBJECTIVE BOX
HPI:    72-year-old female with PMHx of ALS, DM, HTN, HLD, recent covid presents with shortness of breath x1 day.  Patient reportedly 86% on room air at home. No other complaints except for LE swelling and pain at the buttocks. Denies fevers, chills, chest pain, cough, abdominal pain, nausea, vomiting, rash.  Per d/w daughter, ALS diagnosed 2020. Has recently begun to progress rapidly. Patient was able to ambulate one week prior to presentation using rolling walker. Now patient has been bed bound, too weak to ambulate. Pt also with worsening dysphagia per daughter. Family requesting social work/ case management assistance in helping coordinate safe discharge and equipment to help take care of patient at home.     in the ED,pt's VS T(C): 36.4 -36.9  HR:  (67 - 98)  BP: (106/71 - 135/72)  RR:  (17 - 18)  SpO2:  (97% - 99%); now on NRB   labs done showed no wbc, anemia 10.2 Hb, INR 0.96, Na 149, bicarb 36, VBG with hypercapnia, troponin 0.08  CT chest with small RLL pulmonary embolus w/o RHS and bibasilar patchy consolidative opacities   pt received lovenox, rocephin azithromycin  pt is admitted for workup/management PE/ worsening ALS   (19 Sep 2022 06:25)     INTERVAL EVENTS:  9/20: pt comfortable on NC, eating lunch and feeding herself. not verbal but able to communicate and comprehend. son and other family at bedside   9/21: pt listening to audio book. son, Jorge A at bedside. she reports feeling well and being agreeable to PEG. per Jorge A, they are waiting for clearance. no new concerns.     ADVANCE DIRECTIVES:    MOLST  [ ]  Living Will  [ ]   DECISION MAKER(s):  [X ] Health Care Proxy(s)  [ ] Surrogate(s)  [ ] Guardian           Name(s): Phone Number(s): Son- HCP form is in chart     BASELINE (I)ADL(s) (prior to admission):  [place]: [ ]Total  [ ] Moderate [ X]Dependent  Palliative Performance Status Version 2:        30 %    http://npcrc.org/files/news/palliative_performance_scale_[email or web address].pdf    Allergies    No Known Allergies    Intolerances    MEDICATIONS  (STANDING):  ampicillin/sulbactam  IVPB      ampicillin/sulbactam  IVPB 1.5 Gram(s) IV Intermittent every 6 hours  atorvastatin Oral Tab/Cap - Peds 10 milliGRAM(s) Oral daily  chlorhexidine 2% Cloths 1 Application(s) Topical <User Schedule>  dextrose 5%. 1000 milliLiter(s) (50 mL/Hr) IV Continuous <Continuous>  dextrose 5%. 1000 milliLiter(s) (100 mL/Hr) IV Continuous <Continuous>  dextrose 50% Injectable 25 Gram(s) IV Push once  dextrose 50% Injectable 12.5 Gram(s) IV Push once  dextrose 50% Injectable 25 Gram(s) IV Push once  enoxaparin Injectable 40 milliGRAM(s) SubCutaneous every 12 hours  glucagon  Injectable 1 milliGRAM(s) IntraMuscular once  insulin lispro (ADMELOG) corrective regimen sliding scale   SubCutaneous three times a day before meals  losartan 25 milliGRAM(s) Oral daily  riluzole 50 milliGRAM(s) Oral two times a day  scopolamine 1 mG/72 Hr(s) Patch 1 Patch Transdermal every 72 hours    MEDICATIONS  (PRN):  acetaminophen     Tablet .. 650 milliGRAM(s) Oral every 6 hours PRN Temp greater or equal to 38C (100.4F), Mild Pain (1 - 3)  aluminum hydroxide/magnesium hydroxide/simethicone Suspension 30 milliLiter(s) Oral every 4 hours PRN Dyspepsia  dextrose Oral Gel 15 Gram(s) Oral once PRN Blood Glucose LESS THAN 70 milliGRAM(s)/deciliter  melatonin 3 milliGRAM(s) Oral at bedtime PRN Insomnia  ondansetron Injectable 4 milliGRAM(s) IV Push every 8 hours PRN Nausea and/or Vomiting    PRESENT SYMPTOMS:   Pain: [ ]yes [X ]no  QOL impact -   Location -                    Aggravating factors -  Quality -  Radiation -  Timing-  Severity (0-10 scale):  Minimal acceptable level (0-10 scale):     PAIN AD Score: 0    [email or web address] (press ctrl +  left click to view)    Dyspnea:                           [ ]Mild [ ]Moderate [ ]Severe  Anxiety:                             [ ]Mild [ ]Moderate [ ]Severe  Fatigue:                             [ ]Mild [ ]Moderate [ ]Severe  Nausea:                             [ ]Mild [ ]Moderate [ ]Severe  Loss of appetite:              [ ]Mild [ ]Moderate [ ]Severe  Constipation:                    [ ]Mild [ ]Moderate [ ]Severe    Other Symptoms:  [ X ]All other review of systems negative     Palliative Performance Status Version 2:    30  %    http://[facility].org/files/news/palliative_performance_scale_[email or web address].pdf    PHYSICAL EXAM:  ICU Vital Signs Last 24 Hrs  T(C): 35.7 (21 Sep 2022 05:06), Max: 36.7 (20 Sep 2022 19:57)  T(F): 96.3 (21 Sep 2022 05:06), Max: 98 (20 Sep 2022 19:57)  HR: 85 (21 Sep 2022 05:06) (85 - 97)  BP: 129/86 (21 Sep 2022 05:06) (120/60 - 129/86)    GENERAL:  [X ]Alert  [ X]Oriented x 3   [ ]Lethargic  [ ]Cachexia  [ ]Unarousable  [ ]Verbal  [ X]Non-Verbal  Behavioral:   [ ] Anxiety  [ ] Delirium [ ] Agitation [X ] Calm   HEENT:  [ X]Normal   [ ]Dry mouth   [ ]ET Tube/Trach  [ ]Oral lesions  PULMONARY:   [ X]Not labored [ ]Tachypnea  [ ]Audible excessive secretions   On nasal cannula  CARDIOVASCULAR:    [ X]Regular [ ]Irregular [ ]Tachy  [ ]Hunter [ ]Murmur [ ]Other  GASTROINTESTINAL:  [X ]Nondistended  [ ]Distended   [ ]+BS  [ ]Non tender [ ]Tender  [ ]PEG [ ]OGT/ NGT  Last BM:   GENITOURINARY:  [ X]Normal [ ] Incontinent   [ ]Oliguria/Anuria   [ ]Damon  MUSCULOSKELETAL:   [ ]Normal   [ ]Weakness  [X ]Bed/Wheelchair bound [ ]Edema  NEUROLOGIC:   [ ]No focal deficits  [ ]Cognitive impairment  [ ]Dysphagia [ ]Dysarthria [ ]Paresis [X ]Other  - weakness of legs, continues to have use of her hands, nonverbal   SKIN:   [ X]No jaundice    [ ]Rash  [ ]Pressure ulcer(s)       Present on admission [ ]y [ ]n    LABS:      RADIOLOGY & ADDITIONAL STUDIES:    < from: CT Angio Chest PE Protocol w/ IV Cont (09.19.22 @ 02:26) >      1.  Small right lower lobe pulmonary emboli. No evidence of right heart   strain.  2.  Left greater than right patchy consolidative opacities may represent   atelectasis or pneumonia in the appropriate clinical setting.    --- End of Report ---    ***Please see the addendum at the top of this report. It may contain     < end of copied text >          HPI:    72-year-old female with PMHx of ALS, DM, HTN, HLD, recent covid presents with shortness of breath x1 day.  Patient reportedly 86% on room air at home. No other complaints except for LE swelling and pain at the buttocks. Denies fevers, chills, chest pain, cough, abdominal pain, nausea, vomiting, rash.  Per d/w daughter, ALS diagnosed 2020. Has recently begun to progress rapidly. Patient was able to ambulate one week prior to presentation using rolling walker. Now patient has been bed bound, too weak to ambulate. Pt also with worsening dysphagia per daughter. Family requesting social work/ case management assistance in helping coordinate safe discharge and equipment to help take care of patient at home. (19 Sep 2022 06:25)     INTERVAL EVENTS:  9/20: pt comfortable on NC, eating lunch and feeding herself. not verbal but able to communicate and comprehend. son and other family at bedside   9/21: pt listening to audio book. son, Jorge A at bedside. she reports feeling well and being agreeable to PEG. per Jorge A, they are waiting for clearance. no new concerns.     ADVANCE DIRECTIVES:    MOLST  [ ]  Living Will  [ ]   DECISION MAKER(s):  [X ] Health Care Proxy(s)  [ ] Surrogate(s)  [ ] Guardian           Name(s): Phone Number(s): Son- HCP form is in chart     BASELINE (I)ADL(s) (prior to admission):  [place]: [ ]Total  [ ] Moderate [ X]Dependent  Palliative Performance Status Version 2:        30 %    http://npcrc.org/files/news/palliative_performance_scale_[email or web address].pdf    Allergies    No Known Allergies    Intolerances    MEDICATIONS  (STANDING):  ampicillin/sulbactam  IVPB      ampicillin/sulbactam  IVPB 1.5 Gram(s) IV Intermittent every 6 hours  atorvastatin Oral Tab/Cap - Peds 10 milliGRAM(s) Oral daily  chlorhexidine 2% Cloths 1 Application(s) Topical <User Schedule>  dextrose 5%. 1000 milliLiter(s) (50 mL/Hr) IV Continuous <Continuous>  dextrose 5%. 1000 milliLiter(s) (100 mL/Hr) IV Continuous <Continuous>  dextrose 50% Injectable 25 Gram(s) IV Push once  dextrose 50% Injectable 12.5 Gram(s) IV Push once  dextrose 50% Injectable 25 Gram(s) IV Push once  enoxaparin Injectable 40 milliGRAM(s) SubCutaneous every 12 hours  glucagon  Injectable 1 milliGRAM(s) IntraMuscular once  insulin lispro (ADMELOG) corrective regimen sliding scale   SubCutaneous three times a day before meals  losartan 25 milliGRAM(s) Oral daily  riluzole 50 milliGRAM(s) Oral two times a day  scopolamine 1 mG/72 Hr(s) Patch 1 Patch Transdermal every 72 hours    MEDICATIONS  (PRN):  acetaminophen     Tablet .. 650 milliGRAM(s) Oral every 6 hours PRN Temp greater or equal to 38C (100.4F), Mild Pain (1 - 3)  aluminum hydroxide/magnesium hydroxide/simethicone Suspension 30 milliLiter(s) Oral every 4 hours PRN Dyspepsia  dextrose Oral Gel 15 Gram(s) Oral once PRN Blood Glucose LESS THAN 70 milliGRAM(s)/deciliter  melatonin 3 milliGRAM(s) Oral at bedtime PRN Insomnia  ondansetron Injectable 4 milliGRAM(s) IV Push every 8 hours PRN Nausea and/or Vomiting    PRESENT SYMPTOMS:   Pain: [ ]yes [X ]no  QOL impact -   Location -                    Aggravating factors -  Quality -  Radiation -  Timing-  Severity (0-10 scale):  Minimal acceptable level (0-10 scale):     PAIN AD Score: 0    [email or web address] (press ctrl +  left click to view)    Dyspnea:                           [ ]Mild [ ]Moderate [ ]Severe  Anxiety:                             [ ]Mild [ ]Moderate [ ]Severe  Fatigue:                             [ ]Mild [ ]Moderate [ ]Severe  Nausea:                             [ ]Mild [ ]Moderate [ ]Severe  Loss of appetite:              [ ]Mild [ ]Moderate [ ]Severe  Constipation:                    [ ]Mild [ ]Moderate [ ]Severe    Other Symptoms:  [ X ]All other review of systems negative     Palliative Performance Status Version 2:    30  %    http://npcrc.org/files/news/palliative_performance_scale_[email or web address].pdf    PHYSICAL EXAM:  ICU Vital Signs Last 24 Hrs  T(C): 35.7 (21 Sep 2022 05:06), Max: 36.7 (20 Sep 2022 19:57)  T(F): 96.3 (21 Sep 2022 05:06), Max: 98 (20 Sep 2022 19:57)  HR: 85 (21 Sep 2022 05:06) (85 - 97)  BP: 129/86 (21 Sep 2022 05:06) (120/60 - 129/86)    GENERAL:  [X ]Alert  [ X]Oriented x 3   [ ]Lethargic  [ ]Cachexia  [ ]Unarousable  [ ]Verbal  [ X]Non-Verbal  Behavioral:   [ ] Anxiety  [ ] Delirium [ ] Agitation [X ] Calm   HEENT:  [ X]Normal   [ ]Dry mouth   [ ]ET Tube/Trach  [ ]Oral lesions  PULMONARY:   [ X]Not labored [ ]Tachypnea  [ ]Audible excessive secretions   On nasal cannula  CARDIOVASCULAR:    [ X]Regular [ ]Irregular [ ]Tachy  [ ]Hunter [ ]Murmur [ ]Other  GASTROINTESTINAL:  [X ]Nondistended  [ ]Distended   [ ]+BS  [ ]Non tender [ ]Tender  [ ]PEG [ ]OGT/ NGT  Last BM:   GENITOURINARY:  [ X]Normal [ ] Incontinent   [ ]Oliguria/Anuria   [ ]Damon  MUSCULOSKELETAL:   [ ]Normal   [ ]Weakness  [X ]Bed/Wheelchair bound [ ]Edema  NEUROLOGIC:   [ ]No focal deficits  [ ]Cognitive impairment  [ ]Dysphagia [ ]Dysarthria [ ]Paresis [X ]Other  - weakness of legs, continues to have use of her hands, nonverbal   SKIN:   [ X]No jaundice    [ ]Rash  [ ]Pressure ulcer(s)       Present on admission [ ]y [ ]n    LABS:      RADIOLOGY & ADDITIONAL STUDIES:    < from: CT Angio Chest PE Protocol w/ IV Cont (09.19.22 @ 02:26) >      1.  Small right lower lobe pulmonary emboli. No evidence of right heart   strain.  2.  Left greater than right patchy consolidative opacities may represent   atelectasis or pneumonia in the appropriate clinical setting.    --- End of Report ---    ***Please see the addendum at the top of this report. It may contain     < end of copied text >

## 2022-09-21 NOTE — PROGRESS NOTE ADULT - SUBJECTIVE AND OBJECTIVE BOX
S  Patient complained of anxiety, atarax and xanax low dose helped  explained why gi cannot do a peg at this time    O  GENERAL: NAD, lying in bed comfortably  HEAD:  Atraumatic, Normocephalic  EYES: conjunctiva and sclera clear  ENT: Moist mucous membranes  NECK: Supple, No JVD  CHEST/LUNG: shallow labored respirations, gasping breaths intermittently  HEART: Regular rate and rhythm; No murmurs, rubs, or gallops  ABDOMEN: Soft, nontender, nondistended  EXTREMITIES:  No clubbing, cyanosis, or edema  NERVOUS SYSTEM:  A&Ox3    Vitals:  ============  T(F): 96.3 (21 Sep 2022 05:06), Max: 98 (20 Sep 2022 19:57)  HR: 85 (21 Sep 2022 05:06)  BP: 129/86 (21 Sep 2022 05:06)  RR: 18 (21 Sep 2022 05:06)  SpO2: 96% (20 Sep 2022 21:06) (96% - 96%)  temp max in last 48H T(F): , Max: 98.2 (09-20-22 @ 12:56)    =======================================================  Current Antibiotics:  ampicillin/sulbactam  IVPB      ampicillin/sulbactam  IVPB 1.5 Gram(s) IV Intermittent every 6 hours    Other medications:  atorvastatin Oral Tab/Cap - Peds 10 milliGRAM(s) Oral daily  chlorhexidine 2% Cloths 1 Application(s) Topical <User Schedule>  dextrose 5%. 1000 milliLiter(s) IV Continuous <Continuous>  dextrose 5%. 1000 milliLiter(s) IV Continuous <Continuous>  dextrose 50% Injectable 25 Gram(s) IV Push once  dextrose 50% Injectable 12.5 Gram(s) IV Push once  dextrose 50% Injectable 25 Gram(s) IV Push once  enoxaparin Injectable 40 milliGRAM(s) SubCutaneous every 12 hours  glucagon  Injectable 1 milliGRAM(s) IntraMuscular once  insulin lispro (ADMELOG) corrective regimen sliding scale   SubCutaneous three times a day before meals  losartan 25 milliGRAM(s) Oral daily  riluzole 50 milliGRAM(s) Oral two times a day  scopolamine 1 mG/72 Hr(s) Patch 1 Patch Transdermal every 72 hours      =======================================================  Labs:            Culture - Blood (collected 09-19-22 @ 12:06)  Source: .Blood None    Culture - Blood (collected 09-19-22 @ 04:50)  Source: .Blood Blood-Peripheral    Culture - Blood (collected 09-19-22 @ 04:50)  Source: .Blood Blood-Peripheral      Creatinine, Serum: 0.7 mg/dL (09-19-22 @ 12:06)  Creatinine, Serum: 0.8 mg/dL (09-18-22 @ 23:10)    Procalcitonin, Serum: 0.26 ng/mL (09-19-22 @ 12:06)            WBC Count: 7.85 K/uL (09-19-22 @ 12:06)  WBC Count: 8.04 K/uL (09-18-22 @ 23:10)    COVID-19 PCR: NotDetec (09-18-22 @ 22:22)      Alkaline Phosphatase, Serum: 95 U/L (09-19-22 @ 12:06)  Alkaline Phosphatase, Serum: 94 U/L (09-18-22 @ 23:10)  Alanine Aminotransferase (ALT/SGPT): 30 U/L (09-19-22 @ 12:06)  Alanine Aminotransferase (ALT/SGPT): 32 U/L (09-18-22 @ 23:10)  Aspartate Aminotransferase (AST/SGOT): 41 U/L (09-19-22 @ 12:06)  Aspartate Aminotransferase (AST/SGOT): 56 U/L (09-18-22 @ 23:10)  Bilirubin Total, Serum: 0.9 mg/dL (09-19-22 @ 12:06)  Bilirubin Total, Serum: 1.0 mg/dL (09-18-22 @ 23:10)

## 2022-09-21 NOTE — PATIENT PROFILE ADULT - FALL HARM RISK - HARM RISK INTERVENTIONS

## 2022-09-21 NOTE — PROGRESS NOTE ADULT - SUBJECTIVE AND OBJECTIVE BOX
Gastroenterology Consultation:    Patient is a 72y old  Female who presents with a chief complaint of sob (21 Sep 2022 08:36)        Admitted on: 09-19-22      HPI:  72-year-old female with PMHx of ALS, DM, HTN, HLD, recent covid presents with shortness of breath x1 day.  Patient reportedly 86% on room air at home. No other complaints except for LE swelling and pain at the buttocks. Denies fevers, chills, chest pain, cough, abdominal pain, nausea, vomiting, rash.  Per d/w daughter, ALS diagnosed 2020. Has recently begun to progress rapidly. Patient was able to ambulate one week prior to presentation using rolling walker. Now patient has been bed bound, too weak to ambulate. Pt also with worsening dysphagia per daughter. Family requesting social work/ case management assistance in helping coordinate safe discharge and equipment to help take care of patient at home.     in the ED,pt's VS T(C): 36.4 -36.9  HR:  (67 - 98)  BP: (106/71 - 135/72)  RR:  (17 - 18)  SpO2:  (97% - 99%); now on NRB   labs done showed no wbc, anemia 10.2 Hb, INR 0.96, Na 149, bicarb 36, VBG with hypercapnia, troponin 0.08  CT chest with small RLL pulmonary embolus w/o RHS and bibasilar patchy consolidative opacities   pt received lovenox, rocephin azithromycin  pt is admitted for workup/management PE/ worsening ALS   (19 Sep 2022 06:25)    GI following up for PEG tube placement eval.     Prior EGD: Does not remember any past EGDs    Prior Colonoscopy: 3/2020: Normal as per family, only showed internal hemorrhoids       PAST MEDICAL & SURGICAL HISTORY:  Amyotrophic lateral sclerosis (ALS)      FAMILY HISTORY: Pancreatic Ca in brother, no colon or other GI malignancies    Social History:  Tobacco: None  Alcohol: None  Drugs: None    Home Medications:  atorvastatin 10 mg oral tablet: 1 tab(s) orally once a day (20 Sep 2022 09:45)  losartan 25 mg oral tablet: 1 tab(s) orally once a day (20 Sep 2022 09:45)  metFORMIN 500 mg oral tablet: 1 tab(s) orally 2 times a day (20 Sep 2022 09:45)  riluzole 50 mg oral tablet: 1 tab(s) orally every 12 hours (20 Sep 2022 09:45)        MEDICATIONS  (STANDING):  ampicillin/sulbactam  IVPB 1.5 Gram(s) IV Intermittent every 6 hours  ampicillin/sulbactam  IVPB      atorvastatin Oral Tab/Cap - Peds 10 milliGRAM(s) Oral daily  chlorhexidine 2% Cloths 1 Application(s) Topical <User Schedule>  dextrose 5%. 1000 milliLiter(s) (100 mL/Hr) IV Continuous <Continuous>  dextrose 5%. 1000 milliLiter(s) (50 mL/Hr) IV Continuous <Continuous>  dextrose 50% Injectable 25 Gram(s) IV Push once  dextrose 50% Injectable 12.5 Gram(s) IV Push once  dextrose 50% Injectable 25 Gram(s) IV Push once  enoxaparin Injectable 40 milliGRAM(s) SubCutaneous every 12 hours  glucagon  Injectable 1 milliGRAM(s) IntraMuscular once  insulin lispro (ADMELOG) corrective regimen sliding scale   SubCutaneous three times a day before meals  losartan 25 milliGRAM(s) Oral daily  riluzole 50 milliGRAM(s) Oral two times a day  scopolamine 1 mG/72 Hr(s) Patch 1 Patch Transdermal every 72 hours    MEDICATIONS  (PRN):  acetaminophen     Tablet .. 650 milliGRAM(s) Oral every 6 hours PRN Temp greater or equal to 38C (100.4F), Mild Pain (1 - 3)  aluminum hydroxide/magnesium hydroxide/simethicone Suspension 30 milliLiter(s) Oral every 4 hours PRN Dyspepsia  dextrose Oral Gel 15 Gram(s) Oral once PRN Blood Glucose LESS THAN 70 milliGRAM(s)/deciliter  hydrOXYzine hydrochloride 25 milliGRAM(s) Oral four times a day PRN Anxiety  melatonin 3 milliGRAM(s) Oral at bedtime PRN Insomnia  ondansetron Injectable 4 milliGRAM(s) IV Push every 8 hours PRN Nausea and/or Vomiting      Allergies  No Known Allergies      Review of Systems:   Constitutional:  No Fever, No Chills. Aphasia, communicates mostly by writing.   ENT/Mouth:  No Hearing Changes,  No Difficulty Swallowing  Eyes:  No Eye Pain, No Vision Changes  Cardiovascular:  No Chest Pain, No Palpitations  Respiratory:  No Cough, No Dyspnea  Gastrointestinal:  As described in HPI  Musculoskeletal:  No Joint Swelling  Skin:  No Skin Lesions, No Jaundice  Neuro:  No Syncope, No Dizziness  Heme/Lymph:  No Bruising, No Bleeding.      Physical Examination:  T(C): 36.7 (09-20-22 @ 05:00), Max: 36.8 (09-19-22 @ 16:51)  HR: 81 (09-20-22 @ 05:00) (81 - 111)  BP: 114/66 (09-20-22 @ 05:00) (90/65 - 175/91)  RR: 16 (09-20-22 @ 05:00) (16 - 18)  SpO2: 98% (09-20-22 @ 08:33) (93% - 100%)      09-19-22 @ 07:01  -  09-20-22 @ 07:00  --------------------------------------------------------  IN: 0 mL / OUT: 900 mL / NET: -900 mL        GENERAL: AAOx3, no acute distress. Communicates by writing on paper, difficulty speaking. Cachectic  HEAD:  Atraumatic, Normocephalic  EYES: conjunctiva and sclera clear  NECK: Supple, no JVD or thyromegaly  CHEST/LUNG: Decreased air entry bilaterally; No wheezes  HEART: Regular rate and rhythm; normal S1, S2, No murmurs.  ABDOMEN: Soft, nontender, nondistended; No rigidity/guarding  NEUROLOGY: No asterixis or tremor.   SKIN: Intact, no jaundice      Data:    Hgb Trend:  10.0  09-19-22 @ 12:06  10.2  09-18-22 @ 23:10        Liver panel trend:  TBili 0.9   /   AST 41   /   ALT 30   /   AlkP 95   /   Tptn 5.4   /   Alb 4.0    /   DBili --      09-19  TBili 1.0   /   AST 56   /   ALT 32   /   AlkP 94   /   Tptn 5.7   /   Alb 4.0    /   DBili --      09-18        Culture - Blood (collected 19 Sep 2022 12:06)  Source: .Blood None  Preliminary Report (21 Sep 2022 01:02):    No growth to date.    Culture - Blood (collected 19 Sep 2022 04:50)  Source: .Blood Blood-Peripheral  Preliminary Report (20 Sep 2022 18:01):    No growth to date.    Culture - Blood (collected 19 Sep 2022 04:50)  Source: .Blood Blood-Peripheral  Preliminary Report (20 Sep 2022 18:01):    No growth to date.        Radiology:    < from: Xray Cinesophagram Swallow Function w/ Contrast (04.18.22 @ 09:52) >  INTERPRETATION:  Clinical History / Reason for exam: Dysphagia    Procedure: Various consistencies of food were given to the patient and   swallowing was observed under fluoroscopy.  This study was performed in   conjunction with the speech pathology department.    Findings/  Impression:    Please refer to report from the department of speech pathology for   detailed description of the findings and recommendations.    --- End of Report ---    < end of copied text >  < from: CT Angio Chest PE Protocol w/ IV Cont (09.19.22 @ 02:26) >  INTERPRETATION:  CLINICAL HISTORY: Shortness of breath.    TECHNIQUE: CTA of the thorax was performed after administration of   contrast per the PE protocol with 75 cc of Omnipaque 350 intravenous   contrast. Sagittal, coronal, and MIP reformats were performed.    COMPARISON: None.      FINDINGS:  TUBES/LINES: None    PULMONARY ARTERIES: There are several small pulmonary emboli in the right   lower lobe.    LUNGS, PLEURA, AND AIRWAYS: Patent central airway. Patchy bibasilar, left   greater than right, consolidative opacities, likely atelectasis No   pleural effusion or pneumothorax.    HEART/GREAT VESSELS: Normal size heart. No evidence of right heart   strain. Normal caliber aorta and main pulmonary artery. No pericardial   effusion. Coronary and aortic artery calcifications.    MEDIASTINUM/LYMPH NODES: No enlarged mediastinal, hilar, or axillary   lymph nodes. Visualized portion of the thyroid gland is unremarkable.    UPPER ABDOMEN: Left hemidiaphragm elevation.    BONES AND SOFT TISSUES: Degenerative changes of the spine. Thoracic   dextroscoliosis.      IMPRESSION:      1.  Small right lower lobe pulmonary emboli. No evidence of right heart   strain.  2.  Left greater than right patchy consolidative opacities may represent   atelectasis or pneumonia in the appropriate clinical setting.    --- End of Report ---    < end of copied text >

## 2022-09-21 NOTE — PROGRESS NOTE ADULT - SUBJECTIVE AND OBJECTIVE BOX
Patient is a 72y old  Female who presents with a chief complaint of sob (20 Sep 2022 15:52)      SUBJECTIVE: no acute events overnight        PHYSICAL EXAM  Vital Signs Last 24 Hrs  T(C): 35.7 (21 Sep 2022 05:06), Max: 36.8 (20 Sep 2022 12:56)  T(F): 96.3 (21 Sep 2022 05:06), Max: 98.2 (20 Sep 2022 12:56)  HR: 85 (21 Sep 2022 05:06) (85 - 118)  BP: 129/86 (21 Sep 2022 05:06) (114/72 - 129/86)  RR: 18 (21 Sep 2022 05:06) (18 - 19)  SpO2: 96% (20 Sep 2022 21:06) (96% - 96%)        CONSTITUTIONAL:  NAD    ENT:   Airway patent,   No thrush    CARDIAC:   Normal rate,   regular rhythm.    no edema      RESPIRATORY:   No Wheezing  Normal chest expansion  Not tachypneic,  No use of accessory muscles    GASTROINTESTINAL:  Abdomen soft,   non-tender,   no guarding,   + BS    MUSCULOSKELETAL:   range of motion is not limited,  no clubbing, cyanosis    NEUROLOGICAL:   Alert and oriented     SKIN:   Skin normal color for race,   warm, dry   No evidence of rash.      09-20-22 @ 07:01  -  09-21-22 @ 07:00  --------------------------------------------------------  IN:  Total IN: 0 mL    OUT:    Indwelling Catheter - Urethral (mL): 1200 mL  Total OUT: 1200 mL    Total NET: -1200 mL          LABS:                          10.0   7.85  )-----------( 125      ( 19 Sep 2022 12:06 )             30.5                                               09-19    149<H>  |  104  |  27<H>  ----------------------------<  86  3.1<L>   |  34<H>  |  0.7    Ca    9.0      19 Sep 2022 12:06  Mg     1.8     09-19    TPro  5.4<L>  /  Alb  4.0  /  TBili  0.9  /  DBili  x   /  AST  41  /  ALT  30  /  AlkPhos  95  09-19                                                 CARDIAC MARKERS ( 19 Sep 2022 12:06 )  x     / 0.08 ng/mL / x     / x     / x                                                LIVER FUNCTIONS - ( 19 Sep 2022 12:06 )  Alb: 4.0 g/dL / Pro: 5.4 g/dL / ALK PHOS: 95 U/L / ALT: 30 U/L / AST: 41 U/L / GGT: x                                                  Culture - Blood (collected 19 Sep 2022 12:06)  Source: .Blood None  Preliminary Report (21 Sep 2022 01:02):    No growth to date.    Culture - Blood (collected 19 Sep 2022 04:50)  Source: .Blood Blood-Peripheral  Preliminary Report (20 Sep 2022 18:01):    No growth to date.    Culture - Blood (collected 19 Sep 2022 04:50)  Source: .Blood Blood-Peripheral  Preliminary Report (20 Sep 2022 18:01):    No growth to date.                                                    MEDICATIONS  (STANDING):  ampicillin/sulbactam  IVPB      ampicillin/sulbactam  IVPB 1.5 Gram(s) IV Intermittent every 6 hours  atorvastatin Oral Tab/Cap - Peds 10 milliGRAM(s) Oral daily  chlorhexidine 2% Cloths 1 Application(s) Topical <User Schedule>  dextrose 5%. 1000 milliLiter(s) (50 mL/Hr) IV Continuous <Continuous>  dextrose 5%. 1000 milliLiter(s) (100 mL/Hr) IV Continuous <Continuous>  dextrose 50% Injectable 25 Gram(s) IV Push once  dextrose 50% Injectable 12.5 Gram(s) IV Push once  dextrose 50% Injectable 25 Gram(s) IV Push once  enoxaparin Injectable 40 milliGRAM(s) SubCutaneous every 12 hours  glucagon  Injectable 1 milliGRAM(s) IntraMuscular once  insulin lispro (ADMELOG) corrective regimen sliding scale   SubCutaneous three times a day before meals  losartan 25 milliGRAM(s) Oral daily  riluzole 50 milliGRAM(s) Oral two times a day  scopolamine 1 mG/72 Hr(s) Patch 1 Patch Transdermal every 72 hours    MEDICATIONS  (PRN):  acetaminophen     Tablet .. 650 milliGRAM(s) Oral every 6 hours PRN Temp greater or equal to 38C (100.4F), Mild Pain (1 - 3)  aluminum hydroxide/magnesium hydroxide/simethicone Suspension 30 milliLiter(s) Oral every 4 hours PRN Dyspepsia  dextrose Oral Gel 15 Gram(s) Oral once PRN Blood Glucose LESS THAN 70 milliGRAM(s)/deciliter  hydrOXYzine hydrochloride 25 milliGRAM(s) Oral four times a day PRN Anxiety  melatonin 3 milliGRAM(s) Oral at bedtime PRN Insomnia  ondansetron Injectable 4 milliGRAM(s) IV Push every 8 hours PRN Nausea and/or Vomiting      CXR reviewed  Patient is a 72y old  Female who presents with a chief complaint of sob (20 Sep 2022 15:52)      SUBJECTIVE: no acute events overnight        PHYSICAL EXAM  Vital Signs Last 24 Hrs  T(C): 35.7 (21 Sep 2022 05:06), Max: 36.8 (20 Sep 2022 12:56)  T(F): 96.3 (21 Sep 2022 05:06), Max: 98.2 (20 Sep 2022 12:56)  HR: 85 (21 Sep 2022 05:06) (85 - 118)  BP: 129/86 (21 Sep 2022 05:06) (114/72 - 129/86)  RR: 18 (21 Sep 2022 05:06) (18 - 19)  SpO2: 96% (20 Sep 2022 21:06) (96% - 96%)        CONSTITUTIONAL:  Chronically ill appearing  NAD    ENT:   Airway patent,   No thrush    CARDIAC:   Normal rate,   regular rhythm.    no edema      RESPIRATORY:   No Wheezing  Normal chest expansion  Not tachypneic,  No use of accessory muscles    GASTROINTESTINAL:  Abdomen soft,   non-tender,   no guarding,   + BS    MUSCULOSKELETAL:   range of motion is limited  no clubbing, cyanosis    NEUROLOGICAL:   Alert and oriented     SKIN:   Skin normal color for race,   warm, dry   No evidence of rash.      09-20-22 @ 07:01  -  09-21-22 @ 07:00  --------------------------------------------------------  IN:  Total IN: 0 mL    OUT:    Indwelling Catheter - Urethral (mL): 1200 mL  Total OUT: 1200 mL    Total NET: -1200 mL          LABS:                          10.0   7.85  )-----------( 125      ( 19 Sep 2022 12:06 )             30.5                                               09-19    149<H>  |  104  |  27<H>  ----------------------------<  86  3.1<L>   |  34<H>  |  0.7    Ca    9.0      19 Sep 2022 12:06  Mg     1.8     09-19    TPro  5.4<L>  /  Alb  4.0  /  TBili  0.9  /  DBili  x   /  AST  41  /  ALT  30  /  AlkPhos  95  09-19                                                 CARDIAC MARKERS ( 19 Sep 2022 12:06 )  x     / 0.08 ng/mL / x     / x     / x                                                LIVER FUNCTIONS - ( 19 Sep 2022 12:06 )  Alb: 4.0 g/dL / Pro: 5.4 g/dL / ALK PHOS: 95 U/L / ALT: 30 U/L / AST: 41 U/L / GGT: x                                                  Culture - Blood (collected 19 Sep 2022 12:06)  Source: .Blood None  Preliminary Report (21 Sep 2022 01:02):    No growth to date.    Culture - Blood (collected 19 Sep 2022 04:50)  Source: .Blood Blood-Peripheral  Preliminary Report (20 Sep 2022 18:01):    No growth to date.    Culture - Blood (collected 19 Sep 2022 04:50)  Source: .Blood Blood-Peripheral  Preliminary Report (20 Sep 2022 18:01):    No growth to date.                                                    MEDICATIONS  (STANDING):  ampicillin/sulbactam  IVPB      ampicillin/sulbactam  IVPB 1.5 Gram(s) IV Intermittent every 6 hours  atorvastatin Oral Tab/Cap - Peds 10 milliGRAM(s) Oral daily  chlorhexidine 2% Cloths 1 Application(s) Topical <User Schedule>  dextrose 5%. 1000 milliLiter(s) (50 mL/Hr) IV Continuous <Continuous>  dextrose 5%. 1000 milliLiter(s) (100 mL/Hr) IV Continuous <Continuous>  dextrose 50% Injectable 25 Gram(s) IV Push once  dextrose 50% Injectable 12.5 Gram(s) IV Push once  dextrose 50% Injectable 25 Gram(s) IV Push once  enoxaparin Injectable 40 milliGRAM(s) SubCutaneous every 12 hours  glucagon  Injectable 1 milliGRAM(s) IntraMuscular once  insulin lispro (ADMELOG) corrective regimen sliding scale   SubCutaneous three times a day before meals  losartan 25 milliGRAM(s) Oral daily  riluzole 50 milliGRAM(s) Oral two times a day  scopolamine 1 mG/72 Hr(s) Patch 1 Patch Transdermal every 72 hours    MEDICATIONS  (PRN):  acetaminophen     Tablet .. 650 milliGRAM(s) Oral every 6 hours PRN Temp greater or equal to 38C (100.4F), Mild Pain (1 - 3)  aluminum hydroxide/magnesium hydroxide/simethicone Suspension 30 milliLiter(s) Oral every 4 hours PRN Dyspepsia  dextrose Oral Gel 15 Gram(s) Oral once PRN Blood Glucose LESS THAN 70 milliGRAM(s)/deciliter  hydrOXYzine hydrochloride 25 milliGRAM(s) Oral four times a day PRN Anxiety  melatonin 3 milliGRAM(s) Oral at bedtime PRN Insomnia  ondansetron Injectable 4 milliGRAM(s) IV Push every 8 hours PRN Nausea and/or Vomiting      CXR reviewed

## 2022-09-21 NOTE — CHART NOTE - NSCHARTNOTEFT_GEN_A_CORE
The external bumper of PEG was loosened at bedside. PEG can be used for feeds. please ensure peg is flushed 100cc NS before and after each use

## 2022-09-21 NOTE — DISCHARGE NOTE NURSING/CASE MANAGEMENT/SOCIAL WORK - NSDCPEFALRISK_GEN_ALL_CORE
For information on Fall & Injury Prevention, visit: [email or web address] OR  [email or web address] OR  https://www.cdc.gov/steadi/patient.html

## 2022-09-22 NOTE — PROGRESS NOTE ADULT - SUBJECTIVE AND OBJECTIVE BOX
Pt seen and examined at bedside.          VITAL SIGNS (Last 24 hrs):  T(C): 35.7 (09-22-22 @ 05:28), Max: 35.8 (09-21-22 @ 20:18)  HR: 83 (09-22-22 @ 05:28) (83 - 93)  BP: 122/70 (09-22-22 @ 05:28) (110/61 - 122/70)  RR: 18 (09-22-22 @ 05:28) (18 - 18)  SpO2: 98% (09-21-22 @ 20:18) (98% - 98%)  Wt(kg): --  Daily     Daily     I&O's Summary    21 Sep 2022 07:01  -  22 Sep 2022 07:00  --------------------------------------------------------  IN: 0 mL / OUT: 100 mL / NET: -100 mL        PHYSICAL EXAM:  GENERAL: NAD  HEAD:  Atraumatic, Normocephalic  EYES:  conjunctiva and sclera clear  NECK: Supple, No JVD  CHEST/LUNG: Clear to auscultation bilaterally; No wheeze  HEART: Regular rate and rhythm; No murmurs, rubs, or gallops  ABDOMEN: Soft, Nontender, Nondistended; Bowel sounds present  EXTREMITIES:  2+ Peripheral Pulses, No clubbing, cyanosis, or edema  SKIN: No rashes or lesions        Labs Reviewed          CBC Full  -  ( 19 Sep 2022 12:06 )  WBC Count : 7.85 K/uL  Hemoglobin : 10.0 g/dL  Hematocrit : 30.5 %  Platelet Count - Automated : 125 K/uL  Mean Cell Volume : 95.9 fL  Mean Cell Hemoglobin : 31.4 pg  Mean Cell Hemoglobin Concentration : 32.8 g/dL  Auto Neutrophil # : 6.09 K/uL  Auto Lymphocyte # : 1.35 K/uL  Auto Monocyte # : 0.37 K/uL  Auto Eosinophil # : 0.01 K/uL  Auto Basophil # : 0.01 K/uL  Auto Neutrophil % : 77.6 %  Auto Lymphocyte % : 17.2 %  Auto Monocyte % : 4.7 %  Auto Eosinophil % : 0.1 %  Auto Basophil % : 0.1 %    BMP:    09-19 @ 12:06    Blood Urea Nitrogen - 27  Calcium - 9.0  Carbond Dioxide - 34  Chloride - 104  Creatinine - 0.7  Glucose - 86  Potassium - 3.1  Sodium - 149         PT/INR - ( 18 Sep 2022 23:10 )   PT: 11.10 sec;   INR: 0.96 ratio         PTT - ( 18 Sep 2022 23:10 )  PTT:29.9 sec  Urine Culture:  09-19 @ 12:06 Urine culture: --    Culture Results:   No growth to date.  Method Type: --  Organism: --  Organism Identification: --  Specimen Source: .Blood None  09-19 @ 04:50 Urine culture: --    Culture Results:   No growth to date.  Method Type: --  Organism: --  Organism Identification: --  Specimen Source: .Blood Blood-Peripheral           MEDICATIONS  (STANDING):  ampicillin/sulbactam  IVPB      ampicillin/sulbactam  IVPB 1.5 Gram(s) IV Intermittent every 6 hours  atorvastatin Oral Tab/Cap - Peds 10 milliGRAM(s) Oral daily  chlorhexidine 2% Cloths 1 Application(s) Topical <User Schedule>  dextrose 5%. 1000 milliLiter(s) (50 mL/Hr) IV Continuous <Continuous>  dextrose 5%. 1000 milliLiter(s) (100 mL/Hr) IV Continuous <Continuous>  dextrose 50% Injectable 25 Gram(s) IV Push once  dextrose 50% Injectable 12.5 Gram(s) IV Push once  dextrose 50% Injectable 25 Gram(s) IV Push once  enoxaparin Injectable 40 milliGRAM(s) SubCutaneous every 12 hours  glucagon  Injectable 1 milliGRAM(s) IntraMuscular once  insulin lispro (ADMELOG) corrective regimen sliding scale   SubCutaneous three times a day before meals  losartan 25 milliGRAM(s) Oral daily  riluzole 50 milliGRAM(s) Oral two times a day  scopolamine 1 mG/72 Hr(s) Patch 1 Patch Transdermal every 72 hours    MEDICATIONS  (PRN):  acetaminophen     Tablet .. 650 milliGRAM(s) Oral every 6 hours PRN Temp greater or equal to 38C (100.4F), Mild Pain (1 - 3)  ALPRAZolam 0.25 milliGRAM(s) Oral two times a day PRN anxiety  aluminum hydroxide/magnesium hydroxide/simethicone Suspension 30 milliLiter(s) Oral every 4 hours PRN Dyspepsia  dextrose Oral Gel 15 Gram(s) Oral once PRN Blood Glucose LESS THAN 70 milliGRAM(s)/deciliter  hydrOXYzine hydrochloride 25 milliGRAM(s) Oral four times a day PRN Anxiety  melatonin 3 milliGRAM(s) Oral at bedtime PRN Insomnia  ondansetron Injectable 4 milliGRAM(s) IV Push every 8 hours PRN Nausea and/or Vomiting

## 2022-09-22 NOTE — PROGRESS NOTE ADULT - ASSESSMENT
72-year-old female with PMHx of ALS, DM, HTN, HLD, recent covid presents with shortness of breath x1 day.  Patient reportedly 86% on room air at home. No other complaints except for LE swelling and pain at the buttocks. Denies fevers, chills, chest pain, cough, abdominal pain, nausea, vomiting, rash.    #Acute hypoxemic respiratory failure on chronic hypercapnic secondary to ALS  #RLL pulmonary embolus   #Aspiration PNA   - CT PE: Small RLL PE; Patchy LLL consolidative opacities  - Cont therapeutic Lovenox --> on discharge, Eliquis 10 BID x7d, then Eliquis 5mg BID   - Unasyn 1.5 q6h   - Pulmonary following   - HOB @ 45 degrees  - Aspiration precautions  - Wean O2 as tolerated  - NIV during sleep, will benefit from NIV at home    #ALS  #Oropharyngeal Dysphagia   - f/u neurology recommendations  - Palliative care on board  - Puree diet w/ mildly thick liquids per s/s. PEG recommended to supplement  - IR consult for PEG     #HTN/HLD  - Cont losartan 25mg qD  - Cont atorvastatin 10mg qD    #DM2  - ISS    DVT PPX, Lovenox    Pending: calorie count, PEG tube placement   Family at bedside

## 2022-09-22 NOTE — PROGRESS NOTE ADULT - SUBJECTIVE AND OBJECTIVE BOX
Patient is a 72y old  Female who presents with a chief complaint of sob (21 Sep 2022 16:59)      SUBJECTIVE: no acute events overnight        PHYSICAL EXAM  Vital Signs Last 24 Hrs  T(C): 35.7 (22 Sep 2022 05:28), Max: 35.8 (21 Sep 2022 20:18)  T(F): 96.2 (22 Sep 2022 05:28), Max: 96.4 (21 Sep 2022 20:18)  HR: 83 (22 Sep 2022 05:28) (83 - 93)  BP: 122/70 (22 Sep 2022 05:28) (110/61 - 122/70)  RR: 18 (22 Sep 2022 05:28) (18 - 18)  SpO2: 98% (21 Sep 2022 20:18) (98% - 98%)      CONSTITUTIONAL:  NAD    ENT:   Airway patent,   No thrush    CARDIAC:   Normal rate,   regular rhythm.    no edema      RESPIRATORY:   No wheezing  Normal chest expansion  Not tachypneic,  No use of accessory muscles    GASTROINTESTINAL:  Abdomen soft,   non-tender,   no guarding,   + BS    MUSCULOSKELETAL:   range of motion is limited  no clubbing, cyanosis    NEUROLOGICAL:   Alert and oriented       SKIN:   Skin normal color for race,   warm, dry   No evidence of rash.      09-21-22 @ 07:01  -  09-22-22 @ 07:00  --------------------------------------------------------  IN:  Total IN: 0 mL    OUT:    Indwelling Catheter - Urethral (mL): 100 mL  Total OUT: 100 mL    Total NET: -100 mL          LABS:                                                                                                                                                                                 Culture - Blood (collected 19 Sep 2022 12:06)  Source: .Blood None  Preliminary Report (21 Sep 2022 01:02):    No growth to date.                                                    MEDICATIONS  (STANDING):  ampicillin/sulbactam  IVPB      ampicillin/sulbactam  IVPB 1.5 Gram(s) IV Intermittent every 6 hours  atorvastatin Oral Tab/Cap - Peds 10 milliGRAM(s) Oral daily  chlorhexidine 2% Cloths 1 Application(s) Topical <User Schedule>  dextrose 5%. 1000 milliLiter(s) (100 mL/Hr) IV Continuous <Continuous>  dextrose 5%. 1000 milliLiter(s) (50 mL/Hr) IV Continuous <Continuous>  dextrose 50% Injectable 25 Gram(s) IV Push once  dextrose 50% Injectable 12.5 Gram(s) IV Push once  dextrose 50% Injectable 25 Gram(s) IV Push once  enoxaparin Injectable 40 milliGRAM(s) SubCutaneous every 12 hours  glucagon  Injectable 1 milliGRAM(s) IntraMuscular once  insulin lispro (ADMELOG) corrective regimen sliding scale   SubCutaneous three times a day before meals  losartan 25 milliGRAM(s) Oral daily  riluzole 50 milliGRAM(s) Oral two times a day  scopolamine 1 mG/72 Hr(s) Patch 1 Patch Transdermal every 72 hours    MEDICATIONS  (PRN):  acetaminophen     Tablet .. 650 milliGRAM(s) Oral every 6 hours PRN Temp greater or equal to 38C (100.4F), Mild Pain (1 - 3)  ALPRAZolam 0.25 milliGRAM(s) Oral two times a day PRN anxiety  aluminum hydroxide/magnesium hydroxide/simethicone Suspension 30 milliLiter(s) Oral every 4 hours PRN Dyspepsia  dextrose Oral Gel 15 Gram(s) Oral once PRN Blood Glucose LESS THAN 70 milliGRAM(s)/deciliter  hydrOXYzine hydrochloride 25 milliGRAM(s) Oral four times a day PRN Anxiety  melatonin 3 milliGRAM(s) Oral at bedtime PRN Insomnia  ondansetron Injectable 4 milliGRAM(s) IV Push every 8 hours PRN Nausea and/or Vomiting      CXR reviewed

## 2022-09-22 NOTE — PROGRESS NOTE ADULT - ASSESSMENT
72-year-old female with PMHx of ALS, DM, HTN, HLD, recent covid presents with shortness of breath x1 day.  Patient reportedly 86% on room air at home. No other complaints except for LE swelling and pain at the buttocks. Denies fevers, chills, chest pain, cough, abdominal pain, nausea, vomiting, rash.    #Acute hypoxemic respiratory failure on chronic hypercapnic secondary to ALS  #RLL pulmonary embolus   #Aspiration PNA   - CT PE: Small RLL PE; Patchy LLL consolidative opacities  - Cont therapeutic Lovenox --> on discharge, Eliquis 10 BID x7d, then Eliquis 5mg BID   - Unasyn 1.5 q6h   - Pulmonary following   - HOB @ 45 degrees  - Aspiration precautions  - Wean O2 as tolerated  - NIV during sleep, will benefit from NIV at home    #ALS  #Oropharyngeal Dysphagia   - f/u neurology recommendations  - Palliative care on board  - Puree diet w/ mildly thick liquids per s/s. PEG recommended to supplement  - IR consult for PEG if diet is inadequate sp calorie count. moderate to high risk per pulm    #HTN/HLD  - Cont losartan 25mg qD  - Cont atorvastatin 10mg qD    #DM2  - ISS    DVT PPX, Lovenox    Pending: calorie count, PEG tube placement   Family at bedside

## 2022-09-22 NOTE — PROGRESS NOTE ADULT - ASSESSMENT
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)   Possible Aspiration   Patient is Moderate to High Risk for IR guided PEG placement     PLAN:    Avoid discontinuation of anticoagulation for more than 24 hours   General perioperative pulmonary care  HOB @ 45 degrees  Aspiration precautions  Wean O2 as tolerated  NIV during sleep, will benefit from NIV at home  Avoid overload  on therapeutic LMWH,   Will discuss with primary team   LE duplex negative from 9/19/22  Complete Unasyn course   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

## 2022-09-22 NOTE — PROGRESS NOTE ADULT - SUBJECTIVE AND OBJECTIVE BOX
S  NAONE, xanax helps with nervousness, tolerating puree diet, calorie count underway    O  GENERAL: NAD, lying in bed comfortably  HEAD:  Atraumatic, Normocephalic  EYES: conjunctiva and sclera clear  ENT: Moist mucous membranes  NECK: Supple, No JVD  CHEST/LUNG: shallow labored respirations, gasping breaths intermittently  HEART: Regular rate and rhythm; No murmurs, rubs, or gallops  ABDOMEN: Soft, nontender, nondistended  EXTREMITIES:  No clubbing, cyanosis, or edema  NERVOUS SYSTEM:  A&O3    Vitals:  ============  T(F): 96.2 (22 Sep 2022 05:28), Max: 96.4 (21 Sep 2022 20:18)  HR: 83 (22 Sep 2022 05:28)  BP: 122/70 (22 Sep 2022 05:28)  RR: 18 (22 Sep 2022 05:28)  SpO2: 98% (21 Sep 2022 20:18) (98% - 98%)  temp max in last 48H T(F): , Max: 98 (09-20-22 @ 19:57)    =======================================================  Current Antibiotics:  ampicillin/sulbactam  IVPB      ampicillin/sulbactam  IVPB 1.5 Gram(s) IV Intermittent every 6 hours    Other medications:  atorvastatin Oral Tab/Cap - Peds 10 milliGRAM(s) Oral daily  chlorhexidine 2% Cloths 1 Application(s) Topical <User Schedule>  dextrose 5%. 1000 milliLiter(s) IV Continuous <Continuous>  dextrose 5%. 1000 milliLiter(s) IV Continuous <Continuous>  dextrose 50% Injectable 25 Gram(s) IV Push once  dextrose 50% Injectable 12.5 Gram(s) IV Push once  dextrose 50% Injectable 25 Gram(s) IV Push once  enoxaparin Injectable 40 milliGRAM(s) SubCutaneous every 12 hours  glucagon  Injectable 1 milliGRAM(s) IntraMuscular once  insulin lispro (ADMELOG) corrective regimen sliding scale   SubCutaneous three times a day before meals  losartan 25 milliGRAM(s) Oral daily  riluzole 50 milliGRAM(s) Oral two times a day  scopolamine 1 mG/72 Hr(s) Patch 1 Patch Transdermal every 72 hours      =======================================================  Labs:            Culture - Blood (collected 09-19-22 @ 12:06)  Source: .Blood None    Culture - Blood (collected 09-19-22 @ 04:50)  Source: .Blood Blood-Peripheral    Culture - Blood (collected 09-19-22 @ 04:50)  Source: .Blood Blood-Peripheral      Creatinine, Serum: 0.7 mg/dL (09-19-22 @ 12:06)  Creatinine, Serum: 0.8 mg/dL (09-18-22 @ 23:10)    Procalcitonin, Serum: 0.26 ng/mL (09-19-22 @ 12:06)            WBC Count: 7.85 K/uL (09-19-22 @ 12:06)  WBC Count: 8.04 K/uL (09-18-22 @ 23:10)    COVID-19 PCR: NotDetec (09-18-22 @ 22:22)      Alkaline Phosphatase, Serum: 95 U/L (09-19-22 @ 12:06)  Alkaline Phosphatase, Serum: 94 U/L (09-18-22 @ 23:10)  Alanine Aminotransferase (ALT/SGPT): 30 U/L (09-19-22 @ 12:06)  Alanine Aminotransferase (ALT/SGPT): 32 U/L (09-18-22 @ 23:10)  Aspartate Aminotransferase (AST/SGOT): 41 U/L (09-19-22 @ 12:06)  Aspartate Aminotransferase (AST/SGOT): 56 U/L (09-18-22 @ 23:10)  Bilirubin Total, Serum: 0.9 mg/dL (09-19-22 @ 12:06)  Bilirubin Total, Serum: 1.0 mg/dL (09-18-22 @ 23:10)

## 2022-09-23 NOTE — PROGRESS NOTE ADULT - PROBLEM SELECTOR PLAN 5
- d/w patient and son  - HCP copy in chart  - introduced palliative care and advance directives, they are considering hospice  - will follow  ______________  Sony Vaughan MD  Palliative Medicine  [facility]   [facility] Pall[facility]  [phone]

## 2022-09-23 NOTE — PROGRESS NOTE ADULT - ASSESSMENT
72-year-old female with PMHx of ALS, DM, HTN, HLD, recent covid presents with shortness of breath x1 day.  Patient reportedly 86% on room air at home. No other complaints except for LE swelling and pain at the buttocks. Denies fevers, chills, chest pain, cough, abdominal pain, nausea, vomiting, rash.    #Acute hypoxemic respiratory failure on chronic hypercapnic secondary to ALS  #RLL pulmonary embolus   #Aspiration PNA   - CT PE: Small RLL PE; Patchy LLL consolidative opacities  - Cont therapeutic Lovenox --> on discharge, Eliquis 10 BID x7d, then Eliquis 5mg BID   - Unasyn 1.5 q6h   - Pulmonary following   - HOB @ 45 degrees  - Aspiration precautions  - Wean O2 as tolerated  - NIV during sleep, will benefit from NIV at home    #Hypernatremia  #Hypokalemia   - start IVF     #ALS  #Oropharyngeal Dysphagia   - f/u neurology recommendations  - Palliative care on board  - Puree diet w/ mildly thick liquids per s/s. PEG recommended to supplement  - Plan for PEG as outpatient     #HTN/HLD  - Cont losartan 25mg qD  - Cont atorvastatin 10mg qD    #DM2  - ISS    DVT PPX, Lovenox    Pending: hypernatremia, hypokalemia, hospice f/u, DME   Plan of care d/w son   Dispo: Home

## 2022-09-23 NOTE — PROGRESS NOTE ADULT - PROBLEM SELECTOR PLAN 2
- patient chairfast at home, cared for by family  - is somewhat verbal but communicates via writing due to difficult communication  - pt and family to think about ventilatory and CPR wishes/advance directives

## 2022-09-23 NOTE — PROGRESS NOTE ADULT - SUBJECTIVE AND OBJECTIVE BOX
HPI:    72-year-old female with PMHx of ALS, DM, HTN, HLD, recent covid presents with shortness of breath x1 day.  Patient reportedly 86% on room air at home. No other complaints except for LE swelling and pain at the buttocks. Denies fevers, chills, chest pain, cough, abdominal pain, nausea, vomiting, rash.  Per d/w daughter, ALS diagnosed 2020. Has recently begun to progress rapidly. Patient was able to ambulate one week prior to presentation using rolling walker. Now patient has been bed bound, too weak to ambulate. Pt also with worsening dysphagia per daughter. Family requesting social work/ case management assistance in helping coordinate safe discharge and equipment to help take care of patient at home. (19 Sep 2022 06:25)     INTERVAL EVENTS:  9/20: pt comfortable on NC, eating lunch and feeding herself. not verbal but able to communicate and comprehend. son and other family at bedside   9/21: pt listening to audio book. son, Jorge A at bedside. she reports feeling well and being agreeable to PEG. per Jorge A, they are waiting for clearance. no new concerns.   9/22: patient having anxiety during my visit, son at bedside, son states anxiety new during this hospital stay    ADVANCE DIRECTIVES:    MOLST  [ ]  Living Will  [ ]   DECISION MAKER(s):  [X ] Health Care Proxy(s)  [ ] Surrogate(s)  [ ] Guardian           Name(s): Phone Number(s): Son- HCP form is in chart     BASELINE (I)ADL(s) (prior to admission):  [place]: [ ]Total  [ ] Moderate [ X]Dependent  Palliative Performance Status Version 2:        30 %    http://npcrc.org/files/news/palliative_[email or web address]_[email or web address]    Allergies    No Known Allergies    Intolerances    MEDICATIONS  (STANDING):  ampicillin/sulbactam  IVPB      ampicillin/sulbactam  IVPB 1.5 Gram(s) IV Intermittent every 6 hours  atorvastatin Oral Tab/Cap - Peds 10 milliGRAM(s) Oral daily  chlorhexidine 2% Cloths 1 Application(s) Topical <User Schedule>  dextrose 5% 1000 milliLiter(s) (75 mL/Hr) IV Continuous <Continuous>  dextrose 5%. 1000 milliLiter(s) (50 mL/Hr) IV Continuous <Continuous>  dextrose 5%. 1000 milliLiter(s) (100 mL/Hr) IV Continuous <Continuous>  dextrose 50% Injectable 25 Gram(s) IV Push once  dextrose 50% Injectable 12.5 Gram(s) IV Push once  dextrose 50% Injectable 25 Gram(s) IV Push once  enoxaparin Injectable 40 milliGRAM(s) SubCutaneous every 12 hours  glucagon  Injectable 1 milliGRAM(s) IntraMuscular once  insulin lispro (ADMELOG) corrective regimen sliding scale   SubCutaneous three times a day before meals  losartan 25 milliGRAM(s) Oral daily  potassium chloride   Powder 40 milliEquivalent(s) Oral every 4 hours  potassium chloride  10 mEq/100 mL IVPB 10 milliEquivalent(s) IV Intermittent every 1 hour  riluzole 50 milliGRAM(s) Oral two times a day  scopolamine 1 mG/72 Hr(s) Patch 1 Patch Transdermal every 72 hours    MEDICATIONS  (PRN):  acetaminophen     Tablet .. 650 milliGRAM(s) Oral every 6 hours PRN Temp greater or equal to 38C (100.4F), Mild Pain (1 - 3)  ALPRAZolam 0.25 milliGRAM(s) Oral two times a day PRN anxiety  aluminum hydroxide/magnesium hydroxide/simethicone Suspension 30 milliLiter(s) Oral every 4 hours PRN Dyspepsia  dextrose Oral Gel 15 Gram(s) Oral once PRN Blood Glucose LESS THAN 70 milliGRAM(s)/deciliter  hydrOXYzine hydrochloride 25 milliGRAM(s) Oral four times a day PRN Anxiety  melatonin 3 milliGRAM(s) Oral at bedtime PRN Insomnia  ondansetron Injectable 4 milliGRAM(s) IV Push every 8 hours PRN Nausea and/or Vomiting      PRESENT SYMPTOMS:   Pain: [ ]yes [X ]no  QOL impact -   Location -                    Aggravating factors -  Quality -  Radiation -  Timing-  Severity (0-10 scale):  Minimal acceptable level (0-10 scale):     PAIN AD Score: 0    [email or web address] (press ctrl +  left click to view)    Dyspnea:                           [ ]Mild [ ]Moderate [ ]Severe  Anxiety:                             [ ]Mild [ ]Moderate [ ]Severe  Fatigue:                             [ ]Mild [ ]Moderate [ ]Severe  Nausea:                             [ ]Mild [ ]Moderate [ ]Severe  Loss of appetite:              [ ]Mild [ ]Moderate [ ]Severe  Constipation:                    [ ]Mild [ ]Moderate [ ]Severe    Other Symptoms:  [ X ]All other review of systems negative     Palliative Performance Status Version 2:    30  %    http://npcrc.org/files/news/palliative_performance_[email or web address]_[email or web address]    PHYSICAL EXAM:  Vital Signs Last 24 Hrs  T(C): 36.2 (23 Sep 2022 15:37), Max: 36.5 (23 Sep 2022 04:57)  T(F): 97.1 (23 Sep 2022 15:37), Max: 97.7 (23 Sep 2022 04:57)  HR: 91 (23 Sep 2022 15:37) (77 - 91)  BP: 115/67 (23 Sep 2022 15:37) (115/67 - 136/63)  BP(mean): --  RR: 18 (23 Sep 2022 15:37) (18 - 20)  SpO2: --        GENERAL:  [X ]Alert  [ X]Oriented x 3   [ ]Lethargic  [ ]Cachexia  [ ]Unarousable  [ ]Verbal  [ X]Non-Verbal  Behavioral:   [ ] Anxiety  [ ] Delirium [ ] Agitation [X ] Calm   HEENT:  [ X]Normal   [ ]Dry mouth   [ ]ET Tube/Trach  [ ]Oral lesions  PULMONARY:   [ X]Not labored [ ]Tachypnea  [ ]Audible excessive secretions   On nasal cannula  CARDIOVASCULAR:    [ X]Regular [ ]Irregular [ ]Tachy  [ ]Hunter [ ]Murmur [ ]Other  GASTROINTESTINAL:  [X ]Nondistended  [ ]Distended   [ ]+BS  [ ]Non tender [ ]Tender  [ ]PEG [ ]OGT/ NGT  Last BM:   GENITOURINARY:  [ X]Normal [ ] Incontinent   [ ]Oliguria/Anuria   [ ]Damon  MUSCULOSKELETAL:   [ ]Normal   [ ]Weakness  [X ]Bed/Wheelchair bound [ ]Edema  NEUROLOGIC:   [ ]No focal deficits  [ ]Cognitive impairment  [ ]Dysphagia [ ]Dysarthria [ ]Paresis [X ]Other  - weakness of legs, continues to have use of her hands, nonverbal   SKIN:   [ X]No jaundice    [ ]Rash  [ ]Pressure ulcer(s)       Present on admission [ ]y [ ]n    LABS:                        10.0   7.09  )-----------( 126      ( 23 Sep 2022 08:56 )             31.8   09-23    152<H>  |  106  |  20  ----------------------------<  93  2.9<L>   |  39<H>  |  0.6<L>    Ca    8.7      23 Sep 2022 08:56  Phos  2.3     09-23  Mg     1.9     09-23        RADIOLOGY & ADDITIONAL STUDIES:    < from: CT Angio Chest PE Protocol w/ IV Cont (09.19.22 @ 02:26) >      1.  Small right lower lobe pulmonary emboli. No evidence of right heart   strain.  2.  Left greater than right patchy consolidative opacities may represent   atelectasis or pneumonia in the appropriate clinical setting.    --- End of Report ---    ***Please see the addendum at the top of this report. It may contain     < end of copied text >

## 2022-09-23 NOTE — HOSPICE CARE NOTE - CONVESATION DETAILS
Consult completed via phone call to patient's daughter Pratima. Reviewed hospice philosophy and services. Patient has a good support system in place. Family leaning towards home hospice services. At this time family requesting time to discuss further with the patient and the family. Hospice to follow up Monday afternoon.

## 2022-09-23 NOTE — PROGRESS NOTE ADULT - ASSESSMENT
ASSESSMENT  72-year-old female with PMHx of ALS, DM, HTN, HLD, recent covid presents with shortness of breath x1 day.  Patient reportedly 86% on room air at home. No other complaints except for LE swelling and pain at the buttocks. Denies fevers, chills, chest pain, cough, abdominal pain, nausea, vomiting, rash.    - acute hypoxic respiratory failure, +PE  - chronic hypercapnia secondary to ALS  - RLL pulmonary embolus/No RHS  - hypokalemia   - DM    PLAN  - obtain height and document - then calc BMI  - f/u phos - keep level > 3.5  - PO diet as tolerated  - cont Glucerna shake 8oz bid; added yogurt and Magic Cup  - add MV with minerals 1 tab daily - for home obtain a chewable adult MV  - will need home care arrangements/ vendor for home PEG feeds IF tube is placed  ** d/w resident and with Pulm fellow:  per Pulm, no procedures for 6 weeks, due to PE. pt declines NG for feeding for that interim. Will discuss further whether IR has "window" to place GT, and what has to be done regarding anti-coagulation for such placement.- for home, and until GT an be placed, consider Boost VHC 2/d - relatively low carb, very concentrated caloric and protein content

## 2022-09-23 NOTE — PROGRESS NOTE ADULT - SUBJECTIVE AND OBJECTIVE BOX
Pt seen and examined at bedside.         VITAL SIGNS (Last 24 hrs):  T(C): 36.2 (09-23-22 @ 15:37), Max: 36.5 (09-23-22 @ 04:57)  HR: 91 (09-23-22 @ 15:37) (77 - 91)  BP: 115/67 (09-23-22 @ 15:37) (115/67 - 136/63)  RR: 18 (09-23-22 @ 15:37) (18 - 20)  SpO2: --  Wt(kg): --  Daily     Daily     I&O's Summary      PHYSICAL EXAM:  GENERAL: NAD   HEAD:  Atraumatic, Normocephalic  EYES: conjunctiva and sclera clear  NECK: Supple, No JVD  CHEST/LUNG: Clear to auscultation bilaterally; No wheeze  HEART: Regular rate and rhythm; No murmurs, rubs, or gallops  ABDOMEN: Soft, Nontender, Nondistended; Bowel sounds present  EXTREMITIES:  2+ Peripheral Pulses, No clubbing, cyanosis, or edema  SKIN: No rashes or lesions    Labs Reviewed     CBC Full  -  ( 23 Sep 2022 08:56 )  WBC Count : 7.09 K/uL  Hemoglobin : 10.0 g/dL  Hematocrit : 31.8 %  Platelet Count - Automated : 126 K/uL  Mean Cell Volume : 101.3 fL  Mean Cell Hemoglobin : 31.8 pg  Mean Cell Hemoglobin Concentration : 31.4 g/dL  Auto Neutrophil # : 5.35 K/uL  Auto Lymphocyte # : 1.26 K/uL  Auto Monocyte # : 0.38 K/uL  Auto Eosinophil # : 0.05 K/uL  Auto Basophil # : 0.02 K/uL  Auto Neutrophil % : 75.4 %  Auto Lymphocyte % : 17.8 %  Auto Monocyte % : 5.4 %  Auto Eosinophil % : 0.7 %  Auto Basophil % : 0.3 %    BMP:    09-23 @ 08:56    Blood Urea Nitrogen - 20  Calcium - 8.7  Carbond Dioxide - 39  Chloride - 106  Creatinine - 0.6  Glucose - 93  Potassium - 2.9  Sodium - 152         Urine Culture:  09-19 @ 12:06 Urine culture: --    Culture Results:   No growth to date.  Method Type: --  Organism: --  Organism Identification: --  Specimen Source: .Blood None  09-19 @ 04:50 Urine culture: --    Culture Results:   No growth to date.  Method Type: --  Organism: --  Organism Identification: --  Specimen Source: .Blood Blood-Peripheral           MEDICATIONS  (STANDING):  ampicillin/sulbactam  IVPB      ampicillin/sulbactam  IVPB 1.5 Gram(s) IV Intermittent every 6 hours  atorvastatin Oral Tab/Cap - Peds 10 milliGRAM(s) Oral daily  chlorhexidine 2% Cloths 1 Application(s) Topical <User Schedule>  dextrose 5% 1000 milliLiter(s) (75 mL/Hr) IV Continuous <Continuous>  dextrose 5%. 1000 milliLiter(s) (50 mL/Hr) IV Continuous <Continuous>  dextrose 5%. 1000 milliLiter(s) (100 mL/Hr) IV Continuous <Continuous>  dextrose 50% Injectable 25 Gram(s) IV Push once  dextrose 50% Injectable 12.5 Gram(s) IV Push once  dextrose 50% Injectable 25 Gram(s) IV Push once  enoxaparin Injectable 40 milliGRAM(s) SubCutaneous every 12 hours  glucagon  Injectable 1 milliGRAM(s) IntraMuscular once  insulin lispro (ADMELOG) corrective regimen sliding scale   SubCutaneous three times a day before meals  losartan 25 milliGRAM(s) Oral daily  potassium chloride   Powder 40 milliEquivalent(s) Oral every 4 hours  potassium chloride  10 mEq/100 mL IVPB 10 milliEquivalent(s) IV Intermittent every 1 hour  riluzole 50 milliGRAM(s) Oral two times a day  scopolamine 1 mG/72 Hr(s) Patch 1 Patch Transdermal every 72 hours    MEDICATIONS  (PRN):  acetaminophen     Tablet .. 650 milliGRAM(s) Oral every 6 hours PRN Temp greater or equal to 38C (100.4F), Mild Pain (1 - 3)  ALPRAZolam 0.25 milliGRAM(s) Oral two times a day PRN anxiety  aluminum hydroxide/magnesium hydroxide/simethicone Suspension 30 milliLiter(s) Oral every 4 hours PRN Dyspepsia  dextrose Oral Gel 15 Gram(s) Oral once PRN Blood Glucose LESS THAN 70 milliGRAM(s)/deciliter  hydrOXYzine hydrochloride 25 milliGRAM(s) Oral four times a day PRN Anxiety  melatonin 3 milliGRAM(s) Oral at bedtime PRN Insomnia  ondansetron Injectable 4 milliGRAM(s) IV Push every 8 hours PRN Nausea and/or Vomiting

## 2022-09-23 NOTE — PROGRESS NOTE ADULT - SUBJECTIVE AND OBJECTIVE BOX
NUTRITION SUPPORT TEAM  -  PROGRESS NOTE     Interval Events:  pt seen this morning, family at bedside  pt alert, not verbal, writing on pad  abd soft, NT  pt ate well last evening, not as well this morning, tolerates Ensure  d/w resident and other (possibly mor tasty) puree supplements ordered    VITALS:  T(F): 97.1 (09-23 @ 15:37), Max: 97.1 (09-23 @ 15:37)  HR: 91 (09-23 @ 15:37) (91 - 91)  BP: 115/67 (09-23 @ 15:37) (115/67 - 115/67)  RR: 18 (09-23 @ 15:37) (18 - 18)  SpO2: --    HEIGHT/WEIGHT/BMI:     Weight (kg): 39.5 (09-20)    I/Os: ---- ???    STANDING MEDICATIONS:   ampicillin/sulbactam  IVPB      ampicillin/sulbactam  IVPB 1.5 Gram(s) IV Intermittent every 6 hours  atorvastatin Oral Tab/Cap - Peds 10 milliGRAM(s) Oral daily  chlorhexidine 2% Cloths 1 Application(s) Topical <User Schedule>  enoxaparin Injectable 40 milliGRAM(s) SubCutaneous every 12 hours  insulin lispro (ADMELOG) corrective regimen sliding scale   SubCutaneous three times a day before meals  losartan 25 milliGRAM(s) Oral daily  potassium chloride   Powder 40 milliEquivalent(s) Oral every 4 hours  potassium chloride  10 mEq/100 mL IVPB 10 milliEquivalent(s) IV Intermittent every 1 hour  riluzole 50 milliGRAM(s) Oral two times a day  scopolamine 1 mG/72 Hr(s) Patch 1 Patch Transdermal every 72 hours      LABS:                         10.0   7.09  )-----------( 126      ( 23 Sep 2022 08:56 )             31.8     147<H>  |  104  |  22<H>  ----------------------------<  148<H>          (09-23-22 @ 19:13)  3.9   |  34<H>  |  0.6<L>    Ca    8.5          (09-23-22 @ 19:13)  Phos  2.3         (09-23-22 @ 08:56)  Mg     1.9         (09-23-22 @ 08:56)    TPro  5.2<L>  /  Alb  3.8  /  TBili  0.7  /  DBili  x   /  AST  57<H>  /  ALT  46<H>  /  AlkPhos  104       09-23-22 @ 08:56      Blood Glucose (Past 24 hours):  114 mg/dL (09-23 @ 16:49)  87 mg/dL (09-23 @ 11:35)  79 mg/dL (09-23 @ 08:20)  98 mg/dL (09-22 @ 22:03)      DIET:   Diet, Regular:   Consistent Carbohydrate Evening Snack  Pureed (PUREED)  Free Water Flush Instructions:  YOGURT and magic cup 2x day each please  Supplement Feeding Modality:  Oral  Glucerna Shake Cans or Servings Per Day:  2       Frequency:  Two Times a day (09-23-22 @ 10:14) [Active]

## 2022-09-24 NOTE — PROGRESS NOTE ADULT - ASSESSMENT
72-year-old female with PMHx of ALS, DM, HTN, HLD, recent covid presents with shortness of breath x1 day.  Patient reportedly 86% on room air at home. No other complaints except for LE swelling and pain at the buttocks. Denies fevers, chills, chest pain, cough, abdominal pain, nausea, vomiting, rash.    #Acute hypoxemic respiratory failure on chronic hypercapnic secondary to ALS  #RLL pulmonary embolus   #Aspiration PNA   - CT PE: Small RLL PE; Patchy LLL consolidative opacities  - Cont therapeutic Lovenox --> on discharge, Eliquis 10 BID x7d, then Eliquis 5mg BID   - Unasyn 1.5 q6h   - Pulmonary following   - HOB @ 45 degrees  - Aspiration precautions  - Wean O2 as tolerated  - NIV during sleep, will benefit from NIV at home  - repeat CXR   - Hospice f/u pending     #Hypernatremia  #Hypokalemia   - c/w IVF     #ALS  #Oropharyngeal Dysphagia   - f/u neurology recommendations  - Palliative care on board  - Puree diet w/ mildly thick liquids per s/s.      #HTN/HLD  - Cont losartan 25mg qD  - Cont atorvastatin 10mg qD    #DM2  - ISS    DVT PPX, Lovenox    Pending:  hospice f/u, DME set up   Plan of care d/w son   Dispo: Home with home hospice

## 2022-09-24 NOTE — PROGRESS NOTE ADULT - SUBJECTIVE AND OBJECTIVE BOX
Pt seen and examined at bedside.           VITAL SIGNS (Last 24 hrs):  T(C): 36.2 (09-24-22 @ 06:03), Max: 36.2 (09-23-22 @ 15:37)  HR: 63 (09-24-22 @ 06:03) (63 - 91)  BP: 107/73 (09-24-22 @ 06:03) (107/73 - 115/67)  RR: 18 (09-24-22 @ 06:03) (18 - 18)        I&O's Summary      PHYSICAL EXAM:  GENERAL: NAD   HEAD:  Atraumatic, Normocephalic  EYES: conjunctiva and sclera clear  NECK: Supple, No JVD  CHEST/LUNG: Clear to auscultation bilaterally; No wheeze  HEART: Regular rate and rhythm; No murmurs, rubs, or gallops  ABDOMEN: Soft, Nontender, Nondistended; Bowel sounds present  EXTREMITIES:  2+ Peripheral Pulses, No clubbing, cyanosis, or edema  SKIN: No rashes or lesions    Labs Reviewed     CBC Full  -  ( 24 Sep 2022 08:12 )  WBC Count : 6.30 K/uL  Hemoglobin : 8.8 g/dL  Hematocrit : 27.8 %  Platelet Count - Automated : 117 K/uL  Mean Cell Volume : 100.0 fL  Mean Cell Hemoglobin : 31.7 pg  Mean Cell Hemoglobin Concentration : 31.7 g/dL  Auto Neutrophil # : 4.44 K/uL  Auto Lymphocyte # : 1.35 K/uL  Auto Monocyte # : 0.42 K/uL  Auto Eosinophil # : 0.06 K/uL  Auto Basophil # : 0.01 K/uL  Auto Neutrophil % : 70.4 %  Auto Lymphocyte % : 21.4 %  Auto Monocyte % : 6.7 %  Auto Eosinophil % : 1.0 %  Auto Basophil % : 0.2 %    BMP:    09-24 @ 08:12    Blood Urea Nitrogen - 21  Calcium - 8.3  Carbon Dioxide - 36  Chloride - 108  Creatinine - 0.5  Glucose - 107  Potassium - 4.5  Sodium - 149         Urine Culture:  09-19 @ 12:06 Urine culture: --    Culture Results:   No growth to date.  Method Type: --  Organism: --  Organism Identification: --  Specimen Source: .Blood None         MEDICATIONS  (STANDING):  ampicillin/sulbactam  IVPB 1.5 Gram(s) IV Intermittent every 6 hours  ampicillin/sulbactam  IVPB      atorvastatin Oral Tab/Cap - Peds 10 milliGRAM(s) Oral daily  chlorhexidine 2% Cloths 1 Application(s) Topical <User Schedule>  dextrose 5% 1000 milliLiter(s) (75 mL/Hr) IV Continuous <Continuous>  dextrose 5%. 1000 milliLiter(s) (50 mL/Hr) IV Continuous <Continuous>  dextrose 5%. 1000 milliLiter(s) (100 mL/Hr) IV Continuous <Continuous>  dextrose 50% Injectable 25 Gram(s) IV Push once  dextrose 50% Injectable 12.5 Gram(s) IV Push once  dextrose 50% Injectable 25 Gram(s) IV Push once  enoxaparin Injectable 40 milliGRAM(s) SubCutaneous every 12 hours  glucagon  Injectable 1 milliGRAM(s) IntraMuscular once  insulin lispro (ADMELOG) corrective regimen sliding scale   SubCutaneous three times a day before meals  losartan 25 milliGRAM(s) Oral daily  multivitamin 1 Tablet(s) Oral daily  riluzole 50 milliGRAM(s) Oral two times a day  scopolamine 1 mG/72 Hr(s) Patch 1 Patch Transdermal every 72 hours    MEDICATIONS  (PRN):  acetaminophen     Tablet .. 650 milliGRAM(s) Oral every 6 hours PRN Temp greater or equal to 38C (100.4F), Mild Pain (1 - 3)  ALPRAZolam 0.5 milliGRAM(s) Oral four times a day PRN anxiety  aluminum hydroxide/magnesium hydroxide/simethicone Suspension 30 milliLiter(s) Oral every 4 hours PRN Dyspepsia  dextrose Oral Gel 15 Gram(s) Oral once PRN Blood Glucose LESS THAN 70 milliGRAM(s)/deciliter  hydrOXYzine hydrochloride 25 milliGRAM(s) Oral four times a day PRN Anxiety  melatonin 3 milliGRAM(s) Oral at bedtime PRN Insomnia  ondansetron Injectable 4 milliGRAM(s) IV Push every 8 hours PRN Nausea and/or Vomiting

## 2022-09-25 NOTE — PROGRESS NOTE ADULT - ASSESSMENT
72-year-old female with PMHx of ALS, DM, HTN, HLD, recent covid presents with shortness of breath x1 day.  Patient reportedly 86% on room air at home. No other complaints except for LE swelling and pain at the buttocks. Denies fevers, chills, chest pain, cough, abdominal pain, nausea, vomiting, rash.    #Acute hypoxemic respiratory failure on chronic hypercapnic secondary to ALS  #RLL pulmonary embolus   #Aspiration PNA   - CT PE: Small RLL PE; Patchy LLL consolidative opacities  - Cont therapeutic Lovenox --> on discharge, Eliquis 10 BID x7d, then Eliquis 5mg BID   - Unasyn 1.5 q6h   - Pulmonary following   - HOB @ 45 degrees  - Aspiration precautions  - Wean O2 as tolerated  - NIV during sleep, will benefit from NIV at home  - Hospice f/u pending     #Hypernatremia  #Hypokalemia   - c/w IVF     #ALS  #Oropharyngeal Dysphagia   - f/u neurology recommendations  - Palliative care on board  - Puree diet w/ mildly thick liquids per s/s.      #HTN/HLD  - Cont losartan 25mg qD  - Cont atorvastatin 10mg qD    #DM2  - ISS    DVT PPX, Lovenox    Pending:  hospice f/u, DME set up   Plan of care d/w son   Dispo: Home with home hospice

## 2022-09-25 NOTE — DISCHARGE NOTE PROVIDER - NSDCFUSCHEDAPPT_GEN_ALL_CORE_FT
Sammy Kirkpatrick  [facility] Physician [facility]  CARDIOLOGY [address]  Scheduled Appointment: 10/20/2022    Jayden David  [facility]  PULMMED [address]  Scheduled Appointment: 11/16/2022    Omar Leblanc  [facility]  NEUROLOGY [address]  Scheduled Appointment: 12/02/2022

## 2022-09-25 NOTE — DISCHARGE NOTE PROVIDER - CARE PROVIDERS DIRECT ADDRESSES
,[email or web address]

## 2022-09-25 NOTE — DISCHARGE NOTE PROVIDER - HOSPITAL COURSE
72-year-old female with PMHx of ALS, DM, HTN, HLD, recent covid presents with shortness of breath x1 day.  Patient reportedly 86% on room air at home. No other complaints except for LE swelling and pain at the buttocks. Denies fevers, chills, chest pain, cough, abdominal pain, nausea, vomiting, rash.  Per d/w daughter, ALS diagnosed 2020. Has recently begun to progress rapidly. Patient was able to ambulate one week prior to presentation using rolling walker. Now patient has been bed bound, too weak to ambulate. Pt also with worsening dysphagia per daughter. Family requesting social work/ case management assistance in helping coordinate safe discharge and equipment to help take care of patient at home.     in the ED,pt's VS T(C): 36.4 -36.9  HR:  (67 - 98)  BP: (106/71 - 135/72)  RR:  (17 - 18)  SpO2:  (97% - 99%); now on NRB   labs done showed no wbc, anemia 10.2 Hb, INR 0.96, Na 149, bicarb 36, VBG with hypercapnia, troponin 0.08  CT chest with small RLL pulmonary embolus w/o RHS and bibasilar patchy consolidative opacities   pt received lovenox, rocephin azithromycin  pt is admitted for workup/management PE/ worsening ALS    Pt continued on unasyn for aspiration pna. Gi was consulted regarding PEG placement to supplement diet, however pulm did not clear patient due to lovenox for PE and overall respiratory status, they did stratify patient as moderate to high risk for IR placed PEG, to be done in more urgent situation when patient does not eat sufficiently. She did pass calorie count and is eating her puree diet well. Respiratory symptoms improved although she needs PRN xanax for anxiety. BIPAP at night initiated, case management to set up at home. Discussion with palliative re hospice for home was had, family leaning towards deferring right now, patient clinically stable for discharge with pcp and nutrition followup and possible peg in 4-6 weeks. 72-year-old female with PMHx of ALS, DM, HTN, HLD, recent covid presents with shortness of breath x1 day.  Patient reportedly 86% on room air at home. No other complaints except for LE swelling and pain at the buttocks. Denies fevers, chills, chest pain, cough, abdominal pain, nausea, vomiting, rash.  Per d/w daughter, ALS diagnosed 2020. Has recently begun to progress rapidly. Patient was able to ambulate one week prior to presentation using rolling walker. Now patient has been bed bound, too weak to ambulate. Pt also with worsening dysphagia per daughter. Family requesting social work/ case management assistance in helping coordinate safe discharge and equipment to help take care of patient at home.     in the ED,pt's VS T(C): 36.4 -36.9  HR:  (67 - 98)  BP: (106/71 - 135/72)  RR:  (17 - 18)  SpO2:  (97% - 99%); now on NRB   labs done showed no wbc, anemia 10.2 Hb, INR 0.96, Na 149, bicarb 36, VBG with hypercapnia, troponin 0.08  CT chest with small RLL pulmonary embolus w/o RHS and bibasilar patchy consolidative opacities   Patietnt was received lovenox for PE and was inially sttarted rocephin and azithromycin for aspiratoin Pnneumonia before switching to Unasyn.   She was admitted for workup/management PE/ aspiration pneumonia and worsening ALS    #Acute hypoxemic respiratory failure on chronic hypercapnic secondary to ALS  #RLL pulmonary embolus   #Aspiration PNA   - CT PE: Small RLL PE; Patchy LLL consolidative opacities  - Eliquis 10 BID x7d, then Eliquis 5mg BID   - Dc Unasyn 1.5 q6h, completed course on 9/27  - Pulmonary following   - HOB @ 45 degrees  - Aspiration precautions  - No PEG as patient is planned for hospice and not cleared by pulmonary.  - Wean O2 as tolerated  - NIV   - Hospice f/u      #Hypernatremia - resolved     #ALS  #Oropharyngeal Dysphagia   - f/u neurology recommendations  - Palliative care on board  - Puree diet w/ mildly thick liquids per s/s.      #HTN/HLD  - Cont losartan 25mg qD  - Cont atorvastatin 10mg qD    #DM2  - ISS    DVT PPX, Eliquis     Pending:   DME set up     Dispo: Home with home hospice .

## 2022-09-25 NOTE — DISCHARGE NOTE PROVIDER - CARE PROVIDER_API CALL
Theresa Grant)  Nutritional Support  [address] [place] [id number]  Phone: [phone]  Fax: [phone]  Follow Up Time: 2 weeks    Martínez Joel)  Family Medicine  [address] [place] [address], [facility] [address]  [place] [address]  Phone: [phone]  Fax: [phone]  Follow Up Time: 1 week

## 2022-09-25 NOTE — DISCHARGE NOTE PROVIDER - NSDCMRMEDTOKEN_GEN_ALL_CORE_FT
atorvastatin 10 mg oral tablet: 1 tab(s) orally once a day  Eliquis Starter Pack for Treatment of DVT and PE 5 mg oral tablet: 2  tab(s) orally 2 times a day for the first 7 days then 1 tab 2 times daily   losartan 25 mg oral tablet: 1 tab(s) orally once a day  melatonin 3 mg oral tablet: 1 tab(s) orally once a day (at bedtime), As needed, Insomnia  metFORMIN 500 mg oral tablet: 1 tab(s) orally 2 times a day  riluzole 50 mg oral tablet: 1 tab(s) orally every 12 hours   ALPRAZolam 0.5 mg oral tablet: 1 tab(s) orally 3 times a day, As Needed -anxiety - for anxiety MDD:1.5 mg  atorvastatin 10 mg oral tablet: 1 tab(s) orally once a day  Eliquis Starter Pack for Treatment of DVT and PE 5 mg oral tablet: 2  tab(s) orally 2 times a day for the first 7 days then 1 tab 2 times daily   losartan 25 mg oral tablet: 1 tab(s) orally once a day  melatonin 3 mg oral tablet: 1 tab(s) orally once a day (at bedtime), As needed, Insomnia  metFORMIN 500 mg oral tablet: 1 tab(s) orally 2 times a day  riluzole 50 mg oral tablet: 1 tab(s) orally every 12 hours

## 2022-09-25 NOTE — DISCHARGE NOTE PROVIDER - NSDCCPCAREPLAN_GEN_ALL_CORE_FT
PRINCIPAL DISCHARGE DIAGNOSIS  Diagnosis: Pulmonary embolism  Assessment and Plan of Treatment: During this hospitalization, you were diagnosed with a pulmonary embolsim. In your case, you have a  deep vein thrombosis (DVT) which is a blood clot in a large vein deep in a leg, arm, or elsewhere in the body. The clot can separate from the vein, travel to the lungs and cut off blood flow. This is a pulmonary embolism (PE). Pulmonary embolism is very serious. Both the prevention and the treatment are similar for DVT and PE.   To help prevent more blood clots from forming, please see your primary care doctor within one week of discharge, and please take your medicines exactly as instructed. Don’t skip doses. You have been prescribed a medication to thin the blood, so that more clots do not form.  Have all lab tests as recommended. This is very important when you take medicines to prevent blood clots. Make sure you stay active and walk for at least 30 minutes every day. When sitting for long periods of time, move your knees, ankles, feet, and toes.    If you smoke, get help to quit. Stay at a healthy weight. Try to exercise at least 30 minutes on most days. Before starting an exercise program, talk with your primary care provider. When traveling by car, make frequent stops to get up and move around. On long airplane rides, get up and move around when possible. If you can’t get up, wiggle your toes, move your ankles and tighten your calves to keep your blood moving.  Seek immediate medical care if you have pain, swelling, and redness in your leg, arm, or other body area. These symptoms may mean another blood clot. Also call your healthcare provider if you have signs and symptoms of bleeding, like blood in your urine, bleeding with bowel movements, or bleeding from the nose, gums, a cut, or vagina. Call 911 if you have symptoms of a blood clot in the lungs including: Chest pain, trouble breathing, coughing blood, fast heartbeat, heavy or uncontrolled bleeding.      SECONDARY DISCHARGE DIAGNOSES  Diagnosis: Pneumonia  Assessment and Plan of Treatment:   Please take your medications as directed. Don’t skip doses. Follow up with your primary care physician within 3 days. Continue taking your antibiotics as directed until they are all gone—even if you start to feel better. This will prevent the pneumonia from  Coughing up mucus is normal. Don’t use medicines to suppress your cough unless your cough is dry, painful, or interferes with your sleep. Get plenty of rest until your fever, shortness of breath, and chest pain go away. Plan to get a flu shot every year. Ask your primary care doctor about pneumonia vaccines.  Seek immediate medical attention if you experience chest pain, trouble breathing, blue lips or fingernails, fever of 100.4°F  (38°C) or higher, yellow, green, bloody, or smelly sputum, more than normal mucus production, vomiting or diarrhea.

## 2022-09-25 NOTE — PROGRESS NOTE ADULT - SUBJECTIVE AND OBJECTIVE BOX
Pt seen and examined at bedside.         VITAL SIGNS (Last 24 hrs):  T(C): 36.1 (09-25-22 @ 05:02), Max: 36.6 (09-24-22 @ 13:04)  HR: 68 (09-25-22 @ 10:43) (59 - 98)  BP: 107/60 (09-25-22 @ 05:02) (107/60 - 112/50)  RR: 18 (09-25-22 @ 05:02) (18 - 18)  SpO2: 99% (09-25-22 @ 10:43) (91% - 99%)  Wt(kg): --  Daily     Daily     I&O's Summary      PHYSICAL EXAM:  GENERAL: NAD   HEAD:  Atraumatic, Normocephalic  EYES:  conjunctiva and sclera clear  NECK: Supple, No JVD  CHEST/LUNG: Clear to auscultation bilaterally; No wheeze  HEART: Regular rate and rhythm; No murmurs, rubs, or gallops  ABDOMEN: Soft, Nontender, Nondistended; Bowel sounds present  EXTREMITIES:  2+ Peripheral Pulses, No clubbing, cyanosis, or edema  PSYCH: AAOx3  NEUROLOGY: non-focal  SKIN: No rashes or lesions    Labs Reviewed        CBC Full  -  ( 24 Sep 2022 08:12 )  WBC Count : 6.30 K/uL  Hemoglobin : 8.8 g/dL  Hematocrit : 27.8 %  Platelet Count - Automated : 117 K/uL  Mean Cell Volume : 100.0 fL  Mean Cell Hemoglobin : 31.7 pg  Mean Cell Hemoglobin Concentration : 31.7 g/dL  Auto Neutrophil # : 4.44 K/uL  Auto Lymphocyte # : 1.35 K/uL  Auto Monocyte # : 0.42 K/uL  Auto Eosinophil # : 0.06 K/uL  Auto Basophil # : 0.01 K/uL  Auto Neutrophil % : 70.4 %  Auto Lymphocyte % : 21.4 %  Auto Monocyte % : 6.7 %  Auto Eosinophil % : 1.0 %  Auto Basophil % : 0.2 %    BMP:    09-24 @ 08:12    Blood Urea Nitrogen - 21  Calcium - 8.3  Carbond Dioxide - 36  Chloride - 108  Creatinine - 0.5  Glucose - 107  Potassium - 4.5  Sodium - 149                MEDICATIONS  (STANDING):  ampicillin/sulbactam  IVPB      ampicillin/sulbactam  IVPB 1.5 Gram(s) IV Intermittent every 6 hours  atorvastatin Oral Tab/Cap - Peds 10 milliGRAM(s) Oral daily  chlorhexidine 2% Cloths 1 Application(s) Topical <User Schedule>  dextrose 5% 1000 milliLiter(s) (75 mL/Hr) IV Continuous <Continuous>  dextrose 5%. 1000 milliLiter(s) (100 mL/Hr) IV Continuous <Continuous>  dextrose 5%. 1000 milliLiter(s) (50 mL/Hr) IV Continuous <Continuous>  dextrose 50% Injectable 25 Gram(s) IV Push once  dextrose 50% Injectable 12.5 Gram(s) IV Push once  dextrose 50% Injectable 25 Gram(s) IV Push once  enoxaparin Injectable 40 milliGRAM(s) SubCutaneous every 12 hours  glucagon  Injectable 1 milliGRAM(s) IntraMuscular once  insulin lispro (ADMELOG) corrective regimen sliding scale   SubCutaneous three times a day before meals  losartan 25 milliGRAM(s) Oral daily  multivitamin 1 Tablet(s) Oral daily  riluzole 50 milliGRAM(s) Oral two times a day  scopolamine 1 mG/72 Hr(s) Patch 1 Patch Transdermal every 72 hours    MEDICATIONS  (PRN):  acetaminophen     Tablet .. 650 milliGRAM(s) Oral every 6 hours PRN Temp greater or equal to 38C (100.4F), Mild Pain (1 - 3)  ALPRAZolam 0.5 milliGRAM(s) Oral four times a day PRN anxiety  aluminum hydroxide/magnesium hydroxide/simethicone Suspension 30 milliLiter(s) Oral every 4 hours PRN Dyspepsia  dextrose Oral Gel 15 Gram(s) Oral once PRN Blood Glucose LESS THAN 70 milliGRAM(s)/deciliter  hydrOXYzine hydrochloride 25 milliGRAM(s) Oral four times a day PRN Anxiety  melatonin 3 milliGRAM(s) Oral at bedtime PRN Insomnia  ondansetron Injectable 4 milliGRAM(s) IV Push every 8 hours PRN Nausea and/or Vomiting

## 2022-09-25 NOTE — DISCHARGE NOTE PROVIDER - PROVIDER TOKENS
PROVIDER:[TOKEN:[[id number]],FOLLOWUP:[2 weeks]],PROVIDER:[TOKEN:[[id number]],FOLLOWUP:[1 week]]

## 2022-09-26 NOTE — PROGRESS NOTE ADULT - TREATMENT GUIDELINE COMMENT
Full Code  Continue med mgmt  Pending PEG placement
FC  Continue  med mgmt  family to speak with hospice

## 2022-09-26 NOTE — HOSPICE CARE NOTE - CONVESATION DETAILS
Active communication with patient’s daughter Kayla. At this time family is requesting home hospice services. Further discussion with Liacatracho Hadley,  [facility] liason who reports that patient may benefit from a NIV set up. Hospice to follow up with family tomorrow to further discuss goals of care as home care services may be appropriate at this time. Hospice to provide update tomorrow.

## 2022-09-26 NOTE — PROGRESS NOTE ADULT - SUBJECTIVE AND OBJECTIVE BOX
FAITH FU  72y Female    INTERVAL HPI/OVERNIGHT EVENTS:    pt seen this morning during AM rounds - she c/o midsternal chest pain and was dyspneic  did not use bipap last night  given morphine 2mg IV x1 and was placed on bipap with resolution of symptoms  case discussed with pt's son and daughter at bedside today  plan for home with hospice now    T(F): 97 (09-26-22 @ 04:35), Max: 97.3 (09-25-22 @ 19:57)  HR: 111 (09-26-22 @ 08:40) (90 - 117)  BP: 110/63 (09-26-22 @ 12:42) (97/54 - 169/83)  RR: 20 (09-26-22 @ 08:40) (18 - 20)  SpO2: 93% (09-26-22 @ 12:42) (93% - 100%) on bipap Fio2 60%  I&O's Summary    CAPILLARY BLOOD GLUCOSE      POCT Blood Glucose.: 80 mg/dL (26 Sep 2022 12:00)  POCT Blood Glucose.: 105 mg/dL (26 Sep 2022 07:25)  POCT Blood Glucose.: 133 mg/dL (25 Sep 2022 21:03)  POCT Blood Glucose.: 77 mg/dL (25 Sep 2022 16:50)        PHYSICAL EXAM:  GENERAL: NAD now on bipap  HEAD:  Normocephalic  EYES:  conjunctiva and sclera clear  ENMT: Moist mucous membranes, was drooling prior to being placed on bipap  NERVOUS SYSTEM:  Alert, awake, Good concentration, communicated via writing earlier today  CHEST/LUNG:  rhonchi b/l  HEART: Regular rate and rhythm  ABDOMEN: Soft, Nontender, Nondistended; Bowel sounds present  EXTREMITIES:   No edema LE  SKIN: cool fingers    Consultant(s) Notes Reviewed:  [x ] YES  [ ] NO  Care Discussed with Consultants/Other Providers [ x] YES  [ ] NO    MEDICATIONS  (STANDING):  ampicillin/sulbactam  IVPB      ampicillin/sulbactam  IVPB 1.5 Gram(s) IV Intermittent every 6 hours  atorvastatin Oral Tab/Cap - Peds 10 milliGRAM(s) Oral daily  chlorhexidine 2% Cloths 1 Application(s) Topical <User Schedule>  dextrose 5% + sodium chloride 0.45%. 1000 milliLiter(s) (60 mL/Hr) IV Continuous <Continuous>  dextrose 5%. 1000 milliLiter(s) (50 mL/Hr) IV Continuous <Continuous>  dextrose 5%. 1000 milliLiter(s) (100 mL/Hr) IV Continuous <Continuous>  dextrose 50% Injectable 25 Gram(s) IV Push once  dextrose 50% Injectable 12.5 Gram(s) IV Push once  dextrose 50% Injectable 25 Gram(s) IV Push once  enoxaparin Injectable 40 milliGRAM(s) SubCutaneous every 12 hours  glucagon  Injectable 1 milliGRAM(s) IntraMuscular once  insulin lispro (ADMELOG) corrective regimen sliding scale   SubCutaneous three times a day before meals  losartan 25 milliGRAM(s) Oral daily  multivitamin 1 Tablet(s) Oral daily  riluzole 50 milliGRAM(s) Oral two times a day  scopolamine 1 mG/72 Hr(s) Patch 1 Patch Transdermal every 72 hours  sodium zirconium cyclosilicate 10 Gram(s) Oral every 8 hours    MEDICATIONS  (PRN):  acetaminophen     Tablet .. 650 milliGRAM(s) Oral every 6 hours PRN Temp greater or equal to 38C (100.4F), Mild Pain (1 - 3)  ALPRAZolam 0.5 milliGRAM(s) Oral four times a day PRN anxiety  aluminum hydroxide/magnesium hydroxide/simethicone Suspension 30 milliLiter(s) Oral every 4 hours PRN Dyspepsia  dextrose Oral Gel 15 Gram(s) Oral once PRN Blood Glucose LESS THAN 70 milliGRAM(s)/deciliter  hydrOXYzine hydrochloride 25 milliGRAM(s) Oral four times a day PRN Anxiety  melatonin 3 milliGRAM(s) Oral at bedtime PRN Insomnia  ondansetron Injectable 4 milliGRAM(s) IV Push every 8 hours PRN Nausea and/or Vomiting      LABS:                        8.3    9.14  )-----------( 100      ( 26 Sep 2022 07:44 )             25.2     09-26    136  |  101  |  24<H>  ----------------------------<  103<H>  6.1<HH>   |  27  |  0.7    Ca    8.3<L>      26 Sep 2022 07:44  Phos  2.6     09-26  Mg     2.0     09-26    TPro  4.7<L>  /  Alb  3.2<L>  /  TBili  0.4  /  DBili  x   /  AST  106<H>  /  ALT  130<H>  /  AlkPhos  123<H>  09-26          RADIOLOGY & ADDITIONAL TESTS:    Imaging or report Personally Reviewed:  [x ] YES  [ ] NO    < from: Xray Chest 1 View- PORTABLE-Urgent (Xray Chest 1 View- PORTABLE-Urgent .) (09.24.22 @ 13:45) >  FINDINGS/  IMPRESSION:    Elevated left hemidiaphragm. Bibasal opacities, slightly increased. No   pneumothorax.    Stable cardiomediastinal silhouette.    Unchanged osseous structures.    < end of copied text >      < from: [facility] Duplex Lower Ext Vein Scan, Bilat (09.19.22 @ 08:14) >  IMPRESSION:  No evidence of deep venous thrombosis in either lower extremity.    < end of copied text >      < from: CT Angio Chest PE Protocol w/ IV Cont (09.19.22 @ 02:26) >  IMPRESSION:      1.  Small right lower lobe pulmonary emboli. No evidence of right heart   strain.  2.  Left greater than right patchy consolidative opacities may represent   atelectasis or pneumonia in the appropriate clinical setting.      < end of copied text >      < from: TTE Echo Complete w/o Contrast w/ Doppler (09.19.22 @ 08:48) >  Summary:   1. Suboptimal study.   2. Left ventricular ejection fraction, by visual estimation, is >55%.   3. Normal global left ventricular systolic function.   4. The left ventricular diastolic function could not be assessed in this   study.    < end of copied text >      Case discussed with residents and RN on rounds today    Care discussed with pt's family

## 2022-09-26 NOTE — PROGRESS NOTE ADULT - ASSESSMENT
71 y/o woman with PMH of ALS, DM, HTN, hyperlipidemia and recent COVID infection presents with shortness of breath x1 day.  Patient reportedly 86% on room air at home. No other complaints except for LE swelling and pain at the buttocks. Denies fevers, chills, chest pain, cough, abdominal pain, nausea, vomiting, rash.    1. Acute hypoxemic respiratory failure on chronic hypercapnic failure due to ALS  Right LL pulmonary embolus   Aspiration PNA   - CT PE: Small RLL PE; Patchy LLL consolidative opacities  - Cont therapeutic Lovenox --> on discharge, Eliquis 10 BID x7d, then Eliquis 5mg BID   - on Unasyn 1.5 q6h   - evaluated by Pulmonary    - HOB @ 45 degrees  - Aspiration precautions  - Wean O2 as tolerated  - NIV during sleep, will benefit from NIV at home per Pulm  - hospice following - family wants home with hospice     2. Hypernatremia - resolved    3. Hyperkalemia - pt was on IVF with potassium supplementation until yesterday  agree with insulin and D50 and monitor K level  lokelma if able to take PO  hold losartan for now    4. ALS dx in 2021 with progressive disease  wheelchair bound from last 2 months (since covid)  on riluzole and radicava as outpt  excessive drooling - on scopolamine patch  evaluated by neurology this admission    5. Oropharyngeal Dysphagia   - Puree diet w/ mildly thick liquids per s/s eval  - aspiration precautions  - family not interested in PEG placement for now since she will be discharged on home hospice    6. HTN - hold losartan due to hyperkalemia and monitor    7. Hyperlipidemia on statin    8. DM2 - monitor FS    full code status  spoke to pt today in presence of daughter - she wants to continue full code status for now  family understands the overall poor prognosis - want home hospice on discharge  palliative care following    Progress note handoff  Pending: DME set up and discharge planning for home hospice  Plan of care d/w son and daughter today at bedside  Disposition: Home with hospice

## 2022-09-26 NOTE — PROGRESS NOTE ADULT - PROBLEM SELECTOR PLAN 5
- d/w patient and son  - HCP copy in chart  - introduced palliative care and advance directives, they are considering hospice  - will follow

## 2022-09-26 NOTE — PROGRESS NOTE ADULT - SUBJECTIVE AND OBJECTIVE BOX
HPI:    72-year-old female with PMHx of ALS, DM, HTN, HLD, recent covid presents with shortness of breath x1 day.  Patient reportedly 86% on room air at home. No other complaints except for LE swelling and pain at the buttocks. Denies fevers, chills, chest pain, cough, abdominal pain, nausea, vomiting, rash.  Per d/w daughter, ALS diagnosed 2020. Has recently begun to progress rapidly. Patient was able to ambulate one week prior to presentation using rolling walker. Now patient has been bed bound, too weak to ambulate. Pt also with worsening dysphagia per daughter. Family requesting social work/ case management assistance in helping coordinate safe discharge and equipment to help take care of patient at home. (19 Sep 2022 06:25)     INTERVAL EVENTS:  9/20: pt comfortable on NC, eating lunch and feeding herself. not verbal but able to communicate and comprehend. son and other family at bedside   9/21: pt listening to audio book. son, Jorge A at bedside. she reports feeling well and being agreeable to PEG. per Jorge A, they are waiting for clearance. no new concerns.   9/22: patient having anxiety during my visit, son at bedside, son states anxiety new during this hospital stay  9/26: patient comfortable, she has been more lethargic per son and has been requiring more Bipap. family is considering hospice care at home.     ADVANCE DIRECTIVES:    MOLST  [ ]  Living Will  [ ]   DECISION MAKER(s):  [X ] Health Care Proxy(s)  [ ] Surrogate(s)  [ ] Guardian           Name(s): Phone Number(s): Son- HCP form is in chart     BASELINE (I)ADL(s) (prior to admission):  [place]: [ ]Total  [ ] Moderate [ X]Dependent  Palliative Performance Status Version 2:        30 %    http://npcrc.org/files/[email or web address]_[email or web address]_scale_[email or web address]    Allergies    No Known Allergies    Intolerances    MEDICATIONS  (STANDING):  ampicillin/sulbactam  IVPB      ampicillin/sulbactam  IVPB 1.5 Gram(s) IV Intermittent every 6 hours  atorvastatin Oral Tab/Cap - Peds 10 milliGRAM(s) Oral daily  chlorhexidine 2% Cloths 1 Application(s) Topical <User Schedule>  dextrose 5% 1000 milliLiter(s) (75 mL/Hr) IV Continuous <Continuous>  dextrose 5%. 1000 milliLiter(s) (50 mL/Hr) IV Continuous <Continuous>  dextrose 5%. 1000 milliLiter(s) (100 mL/Hr) IV Continuous <Continuous>  dextrose 50% Injectable 25 Gram(s) IV Push once  dextrose 50% Injectable 12.5 Gram(s) IV Push once  dextrose 50% Injectable 25 Gram(s) IV Push once  enoxaparin Injectable 40 milliGRAM(s) SubCutaneous every 12 hours  glucagon  Injectable 1 milliGRAM(s) IntraMuscular once  insulin lispro (ADMELOG) corrective regimen sliding scale   SubCutaneous three times a day before meals  losartan 25 milliGRAM(s) Oral daily  potassium chloride   Powder 40 milliEquivalent(s) Oral every 4 hours  potassium chloride  10 mEq/100 mL IVPB 10 milliEquivalent(s) IV Intermittent every 1 hour  riluzole 50 milliGRAM(s) Oral two times a day  scopolamine 1 mG/72 Hr(s) Patch 1 Patch Transdermal every 72 hours    MEDICATIONS  (PRN):  acetaminophen     Tablet .. 650 milliGRAM(s) Oral every 6 hours PRN Temp greater or equal to 38C (100.4F), Mild Pain (1 - 3)  ALPRAZolam 0.25 milliGRAM(s) Oral two times a day PRN anxiety  aluminum hydroxide/magnesium hydroxide/simethicone Suspension 30 milliLiter(s) Oral every 4 hours PRN Dyspepsia  dextrose Oral Gel 15 Gram(s) Oral once PRN Blood Glucose LESS THAN 70 milliGRAM(s)/deciliter  hydrOXYzine hydrochloride 25 milliGRAM(s) Oral four times a day PRN Anxiety  melatonin 3 milliGRAM(s) Oral at bedtime PRN Insomnia  ondansetron Injectable 4 milliGRAM(s) IV Push every 8 hours PRN Nausea and/or Vomiting      PRESENT SYMPTOMS:   Pain: [ ]yes [X ]no  QOL impact -   Location -                    Aggravating factors -  Quality -  Radiation -  Timing-  Severity (0-10 scale):  Minimal acceptable level (0-10 scale):     PAIN AD Score: 0    [email or web address] (press ctrl +  left click to view)    Dyspnea:                           [ ]Mild [ ]Moderate [ ]Severe  Anxiety:                             [ ]Mild [ ]Moderate [ ]Severe  Fatigue:                             [ ]Mild [ ]Moderate [ ]Severe  Nausea:                             [ ]Mild [ ]Moderate [ ]Severe  Loss of appetite:              [ ]Mild [ ]Moderate [ ]Severe  Constipation:                    [ ]Mild [ ]Moderate [ ]Severe    Other Symptoms:  [ X ]All other review of systems negative     Palliative Performance Status Version 2:    30  %    [email or web address]    PHYSICAL EXAM:  ICU Vital Signs Last 24 Hrs  T(C): 36.1 (26 Sep 2022 04:35), Max: 36.8 (25 Sep 2022 14:50)  T(F): 97 (26 Sep 2022 04:35), Max: 98.3 (25 Sep 2022 14:50)  HR: 111 (26 Sep 2022 08:40) (89 - 117)  BP: 110/63 (26 Sep 2022 12:42) (97/54 - 169/83)  RR: 20 (26 Sep 2022 08:40) (18 - 20)  SpO2: 93% (26 Sep 2022 12:42) (93% - 100%)    GENERAL:  [X ]Alert  [ X]Oriented x 3   [ ]Lethargic  [ ]Cachexia  [ ]Unarousable  [ ]Verbal  [ X]Non-Verbal  Behavioral:   [ ] Anxiety  [ ] Delirium [ ] Agitation [X ] Calm   HEENT:  [ X]Normal   [ ]Dry mouth   [ ]ET Tube/Trach  [ ]Oral lesions  PULMONARY:   [ X]Not labored [ ]Tachypnea  [ ]Audible excessive secretions   On nasal cannula  CARDIOVASCULAR:    [ X]Regular [ ]Irregular [ ]Tachy  [ ]Hunter [ ]Murmur [ ]Other  GASTROINTESTINAL:  [X ]Nondistended  [ ]Distended   [ ]+BS  [ ]Non tender [ ]Tender  [ ]PEG [ ]OGT/ NGT  Last BM:   GENITOURINARY:  [ X]Normal [ ] Incontinent   [ ]Oliguria/Anuria   [ ]Damon  MUSCULOSKELETAL:   [ ]Normal   [ ]Weakness  [X ]Bed/Wheelchair bound [ ]Edema  NEUROLOGIC:   [ ]No focal deficits  [ ]Cognitive impairment  [ ]Dysphagia [ ]Dysarthria [ ]Paresis [X ]Other  - weakness of legs, continues to have use of her hands, nonverbal   SKIN:   [ X]No jaundice    [ ]Rash  [ ]Pressure ulcer(s)       Present on admission [ ]y [ ]n    LABS:      09-26    136  |  101  |  24<H>  ----------------------------<  103<H>  6.1<HH>   |  27  |  0.7    Ca    8.3<L>      26 Sep 2022 07:44  Phos  2.6     09-26  Mg     2.0     09-26    TPro  4.7<L>  /  Alb  3.2<L>  /  TBili  0.4  /  DBili  x   /  AST  106<H>  /  ALT  130<H>  /  AlkPhos  123<H>  09-26                            8.3    9.14  )-----------( 100      ( 26 Sep 2022 07:44 )             25.2       RADIOLOGY & ADDITIONAL STUDIES:    < from: CT Angio Chest PE Protocol w/ IV Cont (09.19.22 @ 02:26) >      1.  Small right lower lobe pulmonary emboli. No evidence of right heart   strain.  2.  Left greater than right patchy consolidative opacities may represent   atelectasis or pneumonia in the appropriate clinical setting.    --- End of Report ---    ***Please see the addendum at the top of this report. It may contain     < end of copied text >

## 2022-09-26 NOTE — PROGRESS NOTE ADULT - ASSESSMENT
72yFemale with PMH including ALS, DM, HTN, HLD, recent covid, being evaluated for GOC in the setting of admission for hypoxia, CT showed small RLL PE, possible aspiration pna.     Family is considering hospice at home. They plan to speak with them today. Will FU following that conversation.     MEDD (morphine equivalent daily dose): 0      See Recs below.    Please call [phone] with questions or concerns 24/7.   We will continue to follow.     Discussed with primary MD.

## 2022-09-26 NOTE — PROGRESS NOTE ADULT - SUBJECTIVE AND OBJECTIVE BOX
SUBJECTIVE:    Patient is a 72y old Female who presents with a chief complaint of sob (26 Sep 2022 13:59)    Currently admitted to medicine with the primary diagnosis of Pulmonary embolism       Today is hospital day 7d. This morning she is resting comfortably in bed but reports mild chest pain and shorteners of breath since yesterday night.    PAST MEDICAL & SURGICAL HISTORY  Amyotrophic lateral sclerosis (ALS)      SOCIAL HISTORY:  Negative for smoking/alcohol/drug use.     ALLERGIES:  No Known Allergies    MEDICATIONS:  STANDING MEDICATIONS  ampicillin/sulbactam  IVPB      ampicillin/sulbactam  IVPB 1.5 Gram(s) IV Intermittent every 6 hours  atorvastatin Oral Tab/Cap - Peds 10 milliGRAM(s) Oral daily  chlorhexidine 2% Cloths 1 Application(s) Topical <User Schedule>  dextrose 5% + sodium chloride 0.45%. 1000 milliLiter(s) IV Continuous <Continuous>  dextrose 5%. 1000 milliLiter(s) IV Continuous <Continuous>  dextrose 5%. 1000 milliLiter(s) IV Continuous <Continuous>  dextrose 50% Injectable 25 Gram(s) IV Push once  dextrose 50% Injectable 12.5 Gram(s) IV Push once  dextrose 50% Injectable 25 Gram(s) IV Push once  enoxaparin Injectable 40 milliGRAM(s) SubCutaneous every 12 hours  glucagon  Injectable 1 milliGRAM(s) IntraMuscular once  insulin lispro (ADMELOG) corrective regimen sliding scale   SubCutaneous three times a day before meals  losartan 25 milliGRAM(s) Oral daily  multivitamin 1 Tablet(s) Oral daily  riluzole 50 milliGRAM(s) Oral two times a day  scopolamine 1 mG/72 Hr(s) Patch 1 Patch Transdermal every 72 hours  sodium zirconium cyclosilicate 10 Gram(s) Oral every 8 hours    PRN MEDICATIONS  acetaminophen     Tablet .. 650 milliGRAM(s) Oral every 6 hours PRN  ALPRAZolam 0.5 milliGRAM(s) Oral four times a day PRN  aluminum hydroxide/magnesium hydroxide/simethicone Suspension 30 milliLiter(s) Oral every 4 hours PRN  dextrose Oral Gel 15 Gram(s) Oral once PRN  hydrOXYzine hydrochloride 25 milliGRAM(s) Oral four times a day PRN  melatonin 3 milliGRAM(s) Oral at bedtime PRN  ondansetron Injectable 4 milliGRAM(s) IV Push every 8 hours PRN    VITALS:   T(F): 97  HR: 111  BP: 110/63  RR: 20  SpO2: 93%    LABS:                        8.3    9.14  )-----------( 100      ( 26 Sep 2022 07:44 )             25.2     09-26    136  |  101  |  24<H>  ----------------------------<  103<H>  6.1<HH>   |  27  |  0.7    Ca    8.3<L>      26 Sep 2022 07:44  Phos  2.6     09-26  Mg     2.0     09-26    TPro  4.7<L>  /  Alb  3.2<L>  /  TBili  0.4  /  DBili  x   /  AST  106<H>  /  ALT  130<H>  /  AlkPhos  123<H>  09-26    RADIOLOGY:    PHYSICAL EXAM:  GENERAL: NAD   HEAD:  Atraumatic, Normocephalic  EYES: conjunctiva and sclera clear  NECK: Supple, No JVD  CHEST/LUNG: Clear to auscultation bilaterally; No wheeze  HEART: Regular rate and rhythm; No murmurs, rubs, or gallops  ABDOMEN: Soft, Nontender, Nondistended; Bowel sounds present  EXTREMITIES:  2+ Peripheral Pulses, No clubbing, cyanosis, or edema  SKIN: No rashes or lesions    Intravenous access:   NG tube:   Damon Catheter:

## 2022-09-26 NOTE — PROGRESS NOTE ADULT - CONVERSATION DETAILS
Spoke with patient and family at bedside. Son reports that the patient is agreeable to PEG tube as long as she can continue to eat by mouth in addition to PEG feeds.     Discussed advance directives including CPR and intubation. Explained that some patients with ALS eventually require long term ventilation with tracheotomy when the disease begins to affect the lungs. Encouraged the patient and family to discuss amongst themselves, if she would or would not want to be placed on a mechanical ventilator, short or long term. They plan to discuss as a family. Explained DNR and DNI orders.
Spoke with son at bedside. He and his family are considering hospice care at home for the patient. He explains that the past week has been difficult for her and she is declining. Discussed how hospice is a shift in focus to comfort based care and that PEG procedure may not  be in line with goals in that situation. Encouraged him to call us with any questions following his conversation with hospice today.

## 2022-09-26 NOTE — PROGRESS NOTE ADULT - ASSESSMENT
72-year-old female with PMHx of ALS, DM, HTN, HLD, recent covid presents with shortness of breath x1 day.  Patient reportedly 86% on room air at home. No other complaints except for LE swelling and pain at the buttocks. Denies fevers, chills, chest pain, cough, abdominal pain, nausea, vomiting, rash.    #Acute hypoxemic respiratory failure on chronic hypercapnic secondary to ALS  #RLL pulmonary embolus   #Aspiration PNA   - CT PE: Small RLL PE; Patchy LLL consolidative opacities  - Cont therapeutic Lovenox --> on discharge, Eliquis 10 BID x7d, then Eliquis 5mg BID   - c/w Unasyn 1.5 q6h   - Pulmonary following   - HOB @ 45 degrees  - Aspiration precautions  - Wean O2 as tolerated  - NIV during sleep, will benefit from NIV at home  - Morphine 2mg q2h PRN    #ALS  #Oropharyngeal Dysphagia   - f/u neurology recommendations  - Palliative care on board  - Puree diet w/ mildly thick liquids per s/s.   - patient's son does not  want PEG as patient is for hospice care at home       #HTN/HLD  - Cont losartan 25mg qD  - Cont atorvastatin 10mg qD    #DM2  - ISS    DVT PPX, Lovenox    Code status: full code for now (but ongoing discusion)  Plan of care d/w son   Dispo: Home

## 2022-09-27 NOTE — PROGRESS NOTE ADULT - SUBJECTIVE AND OBJECTIVE BOX
FAITH FU  72y Female    INTERVAL HPI/OVERNIGHT EVENTS:    pt seen this morning - c/o dyspnea while on O2 NC and was placed back on bipap per her request  she felt better on NIV  spoke to her son Jorge A later - plan is still for home with hospice   ROS unable to be obtained due to dyspnea    T(F): 96.6 (09-27-22 @ 12:15), Max: 96.6 (09-27-22 @ 12:15)  HR: 89 (09-27-22 @ 12:15) (76 - 101)  BP: 130/61 (09-27-22 @ 12:15) (96/68 - 130/61)  RR: 20 (09-27-22 @ 12:15) (18 - 20)  SpO2: 96% (09-27-22 @ 08:12) (96% - 98%)  I&O's Summary    CAPILLARY BLOOD GLUCOSE      POCT Blood Glucose.: 110 mg/dL (27 Sep 2022 11:54)  POCT Blood Glucose.: 86 mg/dL (27 Sep 2022 07:48)  POCT Blood Glucose.: 92 mg/dL (26 Sep 2022 21:38)  POCT Blood Glucose.: 90 mg/dL (26 Sep 2022 16:37)        PHYSICAL EXAM:  GENERAL: NAD on bipap  HEAD:  Normocephalic  EYES:  conjunctiva and sclera clear  ENMT: Mask in place  NERVOUS SYSTEM:  Alert, awake, Good concentration  overall weakness, dysphonia  CHEST/LUNG: rhonchi b/l  HEART: Regular rate and rhythm  ABDOMEN: Soft, Nontender, Nondistended; Bowel sounds present  EXTREMITIES:   No edema  SKIN: warm, dry    Consultant(s) Notes Reviewed:  [x ] YES  [ ] NO  Care Discussed with Consultants/Other Providers [ x] YES  [ ] NO    MEDICATIONS  (STANDING):  ampicillin/sulbactam  IVPB      ampicillin/sulbactam  IVPB 1.5 Gram(s) IV Intermittent every 6 hours  atorvastatin Oral Tab/Cap - Peds 10 milliGRAM(s) Oral daily  chlorhexidine 2% Cloths 1 Application(s) Topical <User Schedule>  dextrose 5% + sodium chloride 0.45%. 1000 milliLiter(s) (60 mL/Hr) IV Continuous <Continuous>  dextrose 5%. 1000 milliLiter(s) (100 mL/Hr) IV Continuous <Continuous>  dextrose 5%. 1000 milliLiter(s) (50 mL/Hr) IV Continuous <Continuous>  dextrose 50% Injectable 25 Gram(s) IV Push once  dextrose 50% Injectable 12.5 Gram(s) IV Push once  dextrose 50% Injectable 25 Gram(s) IV Push once  enoxaparin Injectable 40 milliGRAM(s) SubCutaneous every 12 hours  glucagon  Injectable 1 milliGRAM(s) IntraMuscular once  insulin lispro (ADMELOG) corrective regimen sliding scale   SubCutaneous three times a day before meals  multivitamin 1 Tablet(s) Oral daily  riluzole 50 milliGRAM(s) Oral two times a day  scopolamine 1 mG/72 Hr(s) Patch 1 Patch Transdermal every 72 hours    MEDICATIONS  (PRN):  acetaminophen     Tablet .. 650 milliGRAM(s) Oral every 6 hours PRN Temp greater or equal to 38C (100.4F), Mild Pain (1 - 3)  ALPRAZolam 0.5 milliGRAM(s) Oral four times a day PRN anxiety  aluminum hydroxide/magnesium hydroxide/simethicone Suspension 30 milliLiter(s) Oral every 4 hours PRN Dyspepsia  dextrose Oral Gel 15 Gram(s) Oral once PRN Blood Glucose LESS THAN 70 milliGRAM(s)/deciliter  hydrOXYzine hydrochloride 25 milliGRAM(s) Oral four times a day PRN Anxiety  melatonin 3 milliGRAM(s) Oral at bedtime PRN Insomnia  ondansetron Injectable 4 milliGRAM(s) IV Push every 8 hours PRN Nausea and/or Vomiting      LABS:                        7.6    7.26  )-----------( 107      ( 27 Sep 2022 07:46 )             23.1     09-27    134<L>  |  98  |  20  ----------------------------<  88  4.6   |  32  |  0.5<L>    Ca    8.3<L>      27 Sep 2022 07:46  Phos  2.6     09-26  Mg     2.0     09-27    TPro  4.3<L>  /  Alb  3.1<L>  /  TBili  0.4  /  DBili  x   /  AST  79<H>  /  ALT  102<H>  /  AlkPhos  109  09-27            Case discussed with residents and RN on rounds today    Care discussed with pt's family

## 2022-09-27 NOTE — PROGRESS NOTE ADULT - NS ATTEND AMEND GEN_ALL_CORE FT
Agree with above, patient seen, revisited GOC with family, son present. States understanding concern about intubation in future given ALS, for now they want to focus on PEG placement first, and are open to further discussion afterwards  ______________  Sony Vaughan MD  Palliative Medicine  [facility]   of Geriatric and Palliative Medicine  [phone]
Agree with above, will sign off, patient stable, no pain, goals are home hospice, d/w patient and son  ______________  Sony Vaughan MD  Palliative Medicine  [facility]   of Geriatric and Palliative Medicine  [phone]
Agree with above, family to d/w hospice today, and are considering hospice at home; aware of need for trach and PEG (if within GOC) as ALS progresses, are discussing  ______________  Sony Vaughan MD  Palliative Medicine  [facility]   of Geriatric and Palliative Medicine  [phone]
Agree with above, discussed DNR/DNI as above and trach in setting of ALS, family to discuss; they are open to PEG  ______________  Sony Vaughan MD  Palliative Medicine  [facility]   of Geriatric and Palliative Medicine  [phone]

## 2022-09-27 NOTE — PROGRESS NOTE ADULT - PROBLEM SELECTOR PLAN 4
- c/w tylenol  - frequent repositioning in bed
- c/w tylenol  - frequent repositioning in bed
- d/w patient and son  - HCP copy in chart  - introduced palliative care and advance directives   - will follow
- c/w tylenol  - frequent repositioning in bed
- d/w patient and son  - HCP copy in chart  - introduced palliative care and advance directives   - will follow

## 2022-09-27 NOTE — PROGRESS NOTE ADULT - PROBLEM SELECTOR PLAN 3
- c/w tylenol  - frequent repositioning in bed
- c/w tylenol  - frequent repositioning in bed
patient and family are open to PEG as long as she can eat by mouth as well  recs per S & S  PEG placement planned for outpatient but now patient is going to hospice so they may opt out
patient and family are open to PEG as long as she can eat by mouth as well  recs per S & S  PEG placement planned for outpatient
patient and family are open to PEG as long as she can eat by mouth as well  recs per S & S  PEG placement planned for outpatient  if patient goes to hospice, they might reconsider PEG

## 2022-09-27 NOTE — PROGRESS NOTE ADULT - ASSESSMENT
71 y/o woman with PMH of ALS, DM, HTN, hyperlipidemia and recent COVID infection presents with shortness of breath x1 day.  Patient reportedly 86% on room air at home. No other complaints except for LE swelling and pain at the buttocks. Denies fevers, chills, chest pain, cough, abdominal pain, nausea, vomiting, rash.    1. Acute hypoxemic respiratory failure on chronic hypercapnic failure due to ALS  Right LL pulmonary embolus   Aspiration PNA   - CT PE: Small RLL PE; Patchy LLL consolidative opacities  - Cont therapeutic Lovenox --> on discharge, Eliquis 10 BID x7d, then Eliquis 5mg BID   - on Unasyn 1.5 q6h - complete 7 day course  - evaluated by Pulmonary    - HOB @ 45 degrees  - Aspiration precautions  - Wean O2 as tolerated  - NIV during sleep, will benefit from NIV at home per Pulm  - hospice following - family wants home with hospice     2. Hypernatremia - resolved    3. Hyperkalemia - now resolved  pt was on IVF with potassium supplementation until 9/25  s/p insulin and D50 with resolution  hold losartan for now    4. ALS dx in 2021 with progressive disease  wheelchair bound for the last 2 months (since covid)  on riluzole and radicava as outpt  excessive drooling - on scopolamine patch  evaluated by neurology this admission    5. Oropharyngeal Dysphagia   - Pureed diet w/ mildly thick liquids per s/s eval  - aspiration precautions  - family not interested in PEG placement for now since she will be discharged on home hospice    6. HTN - hold losartan due to hyperkalemia     7. Hyperlipidemia on statin    8. DM2 - monitor FS    full code status  spoke to pt on 9/26 in the presence of her daughter - she wanted to continue full code status for now  family understands the overall poor prognosis - want home hospice on discharge  palliative care following    Progress note handoff  Pending: DME set up and discharge planning for home hospice  Plan of care d/w son today at bedside  Disposition: Home with hospice

## 2022-09-27 NOTE — PROGRESS NOTE ADULT - PROBLEM SELECTOR PROBLEM 2
Amyotrophic lateral sclerosis (ALS)
Nutritional deficiency
Amyotrophic lateral sclerosis (ALS)
Amyotrophic lateral sclerosis (ALS)
Nutritional deficiency

## 2022-09-27 NOTE — PROGRESS NOTE ADULT - ASSESSMENT
72yFemale with PMH including ALS, DM, HTN, HLD, recent covid, being evaluated for GOC in the setting of admission for hypoxia, CT showed small RLL PE, possible aspiration pna.     Family is planning for hospice at home.   Patient continues to have anxiety but it is helped by the PRN xanax.     MEDD (morphine equivalent daily dose): 0      See Recs below.    Please call [phone] with questions or concerns 24/7.   We will continue to follow.     Discussed with primary MD.

## 2022-09-27 NOTE — PROGRESS NOTE ADULT - PROBLEM SELECTOR PLAN 1
- patient chairfast at home, cared for by family  - is somewhat verbal but communicates via writing due to difficult communication  - pt and family to think about ventilatory and CPR wishes/advance directives
- ongoing issues of panic attacks   - xanax 0.5 Q6 PRN   - Atarax 25 mg Q 6 H PRN
- patient chairfast at home, cared for by family  - is somewhat verbal but communicates via writing due to difficult communication  - pt and family to think about ventilatory and CPR wishes/advance directives
- needed extra dose of xanax today  - consider xanax Q8 PRN from Q12
- ongoing issues of panic attacks   - xanax 0.5 Q6 PRN   - Atarax 25 mg Q 6 H PRN

## 2022-09-27 NOTE — PROGRESS NOTE ADULT - PROBLEM SELECTOR PLAN 5
- d/w patient and son  - HCP copy in chart  - plan for home hospice  - pt is thinking about code status  - will follow - d/w patient and son  - HCP copy in chart  - plan for home hospice  - pt is thinking about code status  - will sign off

## 2022-09-27 NOTE — PROGRESS NOTE ADULT - PROBLEM SELECTOR PLAN 2
- patient chairfast at home, cared for by family  - is somewhat verbal but communicates via writing due to difficult communication  - pt and family to think about ventilatory and CPR wishes/advance directives - patient chairfast at home, cared for by family  - is somewhat verbal but communicates via writing due to difficult communication  - pt and family to think about ventilatory and CPR wishes/advance directives  - c/w IV ampicillin for PNA, high risk, monitor counts

## 2022-09-27 NOTE — PROGRESS NOTE ADULT - SUBJECTIVE AND OBJECTIVE BOX
HPI:    72-year-old female with PMHx of ALS, DM, HTN, HLD, recent covid presents with shortness of breath x1 day.  Patient reportedly 86% on room air at home. No other complaints except for LE swelling and pain at the buttocks. Denies fevers, chills, chest pain, cough, abdominal pain, nausea, vomiting, rash.  Per d/w daughter, ALS diagnosed 2020. Has recently begun to progress rapidly. Patient was able to ambulate one week prior to presentation using rolling walker. Now patient has been bed bound, too weak to ambulate. Pt also with worsening dysphagia per daughter. Family requesting social work/ case management assistance in helping coordinate safe discharge and equipment to help take care of patient at home. (19 Sep 2022 06:25)     INTERVAL EVENTS:  9/20: pt comfortable on NC, eating lunch and feeding herself. not verbal but able to communicate and comprehend. son and other family at bedside   9/21: pt listening to audio book. son, Jorge A at bedside. she reports feeling well and being agreeable to PEG. per Jorge A, they are waiting for clearance. no new concerns.   9/22: patient having anxiety during my visit, son at bedside, son states anxiety new during this hospital stay  9/26: patient comfortable, she has been more lethargic per son and has been requiring more Bipap. family is considering hospice care at home.   9/27: pt on Bipap, she reports it provides her comfort. she reports that she still has anxiety but its not severe and that the xanax does help. she and her son met with Hospice today to discuss home hospice, which they are all agreeable to.     ADVANCE DIRECTIVES:    MOLST  [ ]  Living Will  [ ]   DECISION MAKER(s):  [X ] Health Care Proxy(s)  [ ] Surrogate(s)  [ ] Guardian           Name(s): Phone Number(s): Son- HCP form is in chart     BASELINE (I)ADL(s) (prior to admission):  [place]: [ ]Total  [ ] Moderate [ X]Dependent  Palliative Performance Status Version 2:        30 %    [email or web address]    Allergies    No Known Allergies    Intolerances    MEDICATIONS  (STANDING):  ampicillin/sulbactam  IVPB      ampicillin/sulbactam  IVPB 1.5 Gram(s) IV Intermittent every 6 hours  atorvastatin Oral Tab/Cap - Peds 10 milliGRAM(s) Oral daily  chlorhexidine 2% Cloths 1 Application(s) Topical <User Schedule>  dextrose 5% 1000 milliLiter(s) (75 mL/Hr) IV Continuous <Continuous>  dextrose 5%. 1000 milliLiter(s) (50 mL/Hr) IV Continuous <Continuous>  dextrose 5%. 1000 milliLiter(s) (100 mL/Hr) IV Continuous <Continuous>  dextrose 50% Injectable 25 Gram(s) IV Push once  dextrose 50% Injectable 12.5 Gram(s) IV Push once  dextrose 50% Injectable 25 Gram(s) IV Push once  enoxaparin Injectable 40 milliGRAM(s) SubCutaneous every 12 hours  glucagon  Injectable 1 milliGRAM(s) IntraMuscular once  insulin lispro (ADMELOG) corrective regimen sliding scale   SubCutaneous three times a day before meals  losartan 25 milliGRAM(s) Oral daily  potassium chloride   Powder 40 milliEquivalent(s) Oral every 4 hours  potassium chloride  10 mEq/100 mL IVPB 10 milliEquivalent(s) IV Intermittent every 1 hour  riluzole 50 milliGRAM(s) Oral two times a day  scopolamine 1 mG/72 Hr(s) Patch 1 Patch Transdermal every 72 hours    MEDICATIONS  (PRN):  acetaminophen     Tablet .. 650 milliGRAM(s) Oral every 6 hours PRN Temp greater or equal to 38C (100.4F), Mild Pain (1 - 3)  ALPRAZolam 0.25 milliGRAM(s) Oral two times a day PRN anxiety  aluminum hydroxide/magnesium hydroxide/simethicone Suspension 30 milliLiter(s) Oral every 4 hours PRN Dyspepsia  dextrose Oral Gel 15 Gram(s) Oral once PRN Blood Glucose LESS THAN 70 milliGRAM(s)/deciliter  hydrOXYzine hydrochloride 25 milliGRAM(s) Oral four times a day PRN Anxiety  melatonin 3 milliGRAM(s) Oral at bedtime PRN Insomnia  ondansetron Injectable 4 milliGRAM(s) IV Push every 8 hours PRN Nausea and/or Vomiting      PRESENT SYMPTOMS:   Pain: [ ]yes [X ]no  QOL impact -   Location -                    Aggravating factors -  Quality -  Radiation -  Timing-  Severity (0-10 scale):  Minimal acceptable level (0-10 scale):     PAIN AD Score: 0    [email or web address] (press ctrl +  left click to view)    Dyspnea:                           [ ]Mild [ ]Moderate [ ]Severe  Anxiety:                             [ ]Mild [ ]Moderate [ ]Severe  Fatigue:                             [ ]Mild [ ]Moderate [ ]Severe  Nausea:                             [ ]Mild [ ]Moderate [ ]Severe  Loss of appetite:              [ ]Mild [ ]Moderate [ ]Severe  Constipation:                    [ ]Mild [ ]Moderate [ ]Severe    Other Symptoms:  [ X ]All other review of systems negative     Palliative Performance Status Version 2:    30  %    [email or web address]/[email or web address]    PHYSICAL EXAM:  ICU Vital Signs Last 24 Hrs  T(C): 35.9 (27 Sep 2022 12:15), Max: 35.9 (27 Sep 2022 12:15)  T(F): 96.6 (27 Sep 2022 12:15), Max: 96.6 (27 Sep 2022 12:15)  HR: 89 (27 Sep 2022 12:15) (76 - 101)  BP: 130/61 (27 Sep 2022 12:15) (96/68 - 130/61)  RR: 20 (27 Sep 2022 12:15) (18 - 20)  SpO2: 96% (27 Sep 2022 08:12) (96% - 98%)    GENERAL:  [X ]Alert  [ X]Oriented x 3   [ ]Lethargic  [ ]Cachexia  [ ]Unarousable  [ ]Verbal  [ X]Non-Verbal  Behavioral:   [ ] Anxiety  [ ] Delirium [ ] Agitation [X ] Calm   HEENT:  [ X]Normal   [ ]Dry mouth   [ ]ET Tube/Trach  [ ]Oral lesions  PULMONARY:   [ X]Not labored [ ]Tachypnea  [ ]Audible excessive secretions   On Bipap   On nasal cannula  CARDIOVASCULAR:    [ X]Regular [ ]Irregular [ ]Tachy  [ ]Hunter [ ]Murmur [ ]Other  GASTROINTESTINAL:  [X ]Nondistended  [ ]Distended   [ ]+BS  [ ]Non tender [ ]Tender  [ ]PEG [ ]OGT/ NGT  Last BM:   GENITOURINARY:  [ X]Normal [ ] Incontinent   [ ]Oliguria/Anuria   [ ]Damon  MUSCULOSKELETAL:   [ ]Normal   [ ]Weakness  [X ]Bed/Wheelchair bound [ ]Edema  NEUROLOGIC:   [ ]No focal deficits  [ ]Cognitive impairment  [ ]Dysphagia [ ]Dysarthria [ ]Paresis [X ]Other  - weakness of legs, continues to have use of her hands, nonverbal   SKIN:   [ X]No jaundice    [ ]Rash  [ ]Pressure ulcer(s)       Present on admission [ ]y [ ]n    LABS:      09-26    136  |  101  |  24<H>  ----------------------------<  103<H>  6.1<HH>   |  27  |  0.7    Ca    8.3<L>      26 Sep 2022 07:44  Phos  2.6     09-26  Mg     2.0     09-26    TPro  4.7<L>  /  Alb  3.2<L>  /  TBili  0.4  /  DBili  x   /  AST  106<H>  /  ALT  130<H>  /  AlkPhos  123<H>  09-26                            8.3    9.14  )-----------( 100      ( 26 Sep 2022 07:44 )             25.2       RADIOLOGY & ADDITIONAL STUDIES:    < from: CT Angio Chest PE Protocol w/ IV Cont (09.19.22 @ 02:26) >      1.  Small right lower lobe pulmonary emboli. No evidence of right heart   strain.  2.  Left greater than right patchy consolidative opacities may represent   atelectasis or pneumonia in the appropriate clinical setting.    --- End of Report ---    ***Please see the addendum at the top of this report. It may contain     < end of copied text >    HPI:    72-year-old female with PMHx of ALS, DM, HTN, HLD, recent covid presents with shortness of breath x1 day.  Patient reportedly 86% on room air at home. No other complaints except for LE swelling and pain at the buttocks. Denies fevers, chills, chest pain, cough, abdominal pain, nausea, vomiting, rash.  Per d/w daughter, ALS diagnosed 2020. Has recently begun to progress rapidly. Patient was able to ambulate one week prior to presentation using rolling walker. Now patient has been bed bound, too weak to ambulate. Pt also with worsening dysphagia per daughter. Family requesting social work/ case management assistance in helping coordinate safe discharge and equipment to help take care of patient at home. (19 Sep 2022 06:25)     INTERVAL EVENTS:  9/20: pt comfortable on NC, eating lunch and feeding herself. not verbal but able to communicate and comprehend. son and other family at bedside   9/21: pt listening to audio book. son, Jorge A at bedside. she reports feeling well and being agreeable to PEG. per Jorge A, they are waiting for clearance. no new concerns.   9/22: patient having anxiety during my visit, son at bedside, son states anxiety new during this hospital stay  9/26: patient comfortable, she has been more lethargic per son and has been requiring more Bipap. family is considering hospice care at home.   9/27: pt on Bipap, she reports it provides her comfort. she reports that she still has anxiety but its not severe and that the xanax does help. she and her son met with Hospice today to discuss home hospice, which they are all agreeable to.     ADVANCE DIRECTIVES:    MOLST  [ ]  Living Will  [ ]   DECISION MAKER(s):  [X ] Health Care Proxy(s)  [ ] Surrogate(s)  [ ] Guardian           Name(s): Phone Number(s): Son- HCP form is in chart     BASELINE (I)ADL(s) (prior to admission):  [place]: [ ]Total  [ ] Moderate [ X]Dependent  Palliative Performance Status Version 2:        30 %    [email or web address]    Allergies    No Known Allergies    Intolerances    MEDICATIONS  (STANDING):  ampicillin/sulbactam  IVPB      ampicillin/sulbactam  IVPB 1.5 Gram(s) IV Intermittent every 6 hours  atorvastatin Oral Tab/Cap - Peds 10 milliGRAM(s) Oral daily  chlorhexidine 2% Cloths 1 Application(s) Topical <User Schedule>  dextrose 5% 1000 milliLiter(s) (75 mL/Hr) IV Continuous <Continuous>  dextrose 5%. 1000 milliLiter(s) (50 mL/Hr) IV Continuous <Continuous>  dextrose 5%. 1000 milliLiter(s) (100 mL/Hr) IV Continuous <Continuous>  dextrose 50% Injectable 25 Gram(s) IV Push once  dextrose 50% Injectable 12.5 Gram(s) IV Push once  dextrose 50% Injectable 25 Gram(s) IV Push once  enoxaparin Injectable 40 milliGRAM(s) SubCutaneous every 12 hours  glucagon  Injectable 1 milliGRAM(s) IntraMuscular once  insulin lispro (ADMELOG) corrective regimen sliding scale   SubCutaneous three times a day before meals  losartan 25 milliGRAM(s) Oral daily  potassium chloride   Powder 40 milliEquivalent(s) Oral every 4 hours  potassium chloride  10 mEq/100 mL IVPB 10 milliEquivalent(s) IV Intermittent every 1 hour  riluzole 50 milliGRAM(s) Oral two times a day  scopolamine 1 mG/72 Hr(s) Patch 1 Patch Transdermal every 72 hours    MEDICATIONS  (PRN):  acetaminophen     Tablet .. 650 milliGRAM(s) Oral every 6 hours PRN Temp greater or equal to 38C (100.4F), Mild Pain (1 - 3)  ALPRAZolam 0.25 milliGRAM(s) Oral two times a day PRN anxiety  aluminum hydroxide/magnesium hydroxide/simethicone Suspension 30 milliLiter(s) Oral every 4 hours PRN Dyspepsia  dextrose Oral Gel 15 Gram(s) Oral once PRN Blood Glucose LESS THAN 70 milliGRAM(s)/deciliter  hydrOXYzine hydrochloride 25 milliGRAM(s) Oral four times a day PRN Anxiety  melatonin 3 milliGRAM(s) Oral at bedtime PRN Insomnia  ondansetron Injectable 4 milliGRAM(s) IV Push every 8 hours PRN Nausea and/or Vomiting      PRESENT SYMPTOMS:   Pain: [ ]yes [X ]no  QOL impact -   Location -                    Aggravating factors -  Quality -  Radiation -  Timing-  Severity (0-10 scale):  Minimal acceptable level (0-10 scale):     PAIN AD Score: 0    [email or web address] (press ctrl +  left click to view)    Dyspnea:                           [ ]Mild [ ]Moderate [ ]Severe  Anxiety:                             [ ]Mild [ ]Moderate [ ]Severe  Fatigue:                             [ ]Mild [ ]Moderate [ ]Severe  Nausea:                             [ ]Mild [ ]Moderate [ ]Severe  Loss of appetite:              [ ]Mild [ ]Moderate [ ]Severe  Constipation:                    [ ]Mild [ ]Moderate [ ]Severe    Other Symptoms:  [ X ]All other review of systems negative     Palliative Performance Status Version 2:    30  %    [email or web address]    PHYSICAL EXAM:  ICU Vital Signs Last 24 Hrs  T(C): 35.9 (27 Sep 2022 12:15), Max: 35.9 (27 Sep 2022 12:15)  T(F): 96.6 (27 Sep 2022 12:15), Max: 96.6 (27 Sep 2022 12:15)  HR: 89 (27 Sep 2022 12:15) (76 - 101)  BP: 130/61 (27 Sep 2022 12:15) (96/68 - 130/61)  RR: 20 (27 Sep 2022 12:15) (18 - 20)  SpO2: 96% (27 Sep 2022 08:12) (96% - 98%)    GENERAL:  [X ]Alert  [ X]Oriented x 3   [ ]Lethargic  [ ]Cachexia  [ ]Unarousable  [ ]Verbal  [ X]Non-Verbal  Behavioral:   [ ] Anxiety  [ ] Delirium [ ] Agitation [X ] Calm   HEENT:  [ X]Normal   [ ]Dry mouth   [ ]ET Tube/Trach  [ ]Oral lesions  PULMONARY:   [ X]Not labored [ ]Tachypnea  [ ]Audible excessive secretions   On Bipap   On nasal cannula  CARDIOVASCULAR:    [ X]Regular [ ]Irregular [ ]Tachy  [ ]Hunter [ ]Murmur [ ]Other  GASTROINTESTINAL:  [X ]Nondistended  [ ]Distended   [ ]+BS  [ ]Non tender [ ]Tender  [ ]PEG [ ]OGT/ NGT  Last BM:   GENITOURINARY:  [ X]Normal [ ] Incontinent   [ ]Oliguria/Anuria   [ ]Damon  MUSCULOSKELETAL:   [ ]Normal   [ ]Weakness  [X ]Bed/Wheelchair bound [ ]Edema  NEUROLOGIC:   [ ]No focal deficits  [ ]Cognitive impairment  [ ]Dysphagia [ ]Dysarthria [ ]Paresis [X ]Other  - weakness of legs, continues to have use of her hands, nonverbal   SKIN:   [ X]No jaundice    [ ]Rash  [ ]Pressure ulcer(s)       Present on admission [ ]y [ ]n    LABS: reviewed                          7.6    7.26  )-----------( 107      ( 27 Sep 2022 07:46 )             23.1     09-27    134<L>  |  98  |  20  ----------------------------<  88  4.6   |  32  |  0.5<L>    Ca    8.3<L>      27 Sep 2022 07:46  Phos  2.6     09-26  Mg     2.0     09-27        RADIOLOGY & ADDITIONAL STUDIES:    < from: CT Angio Chest PE Protocol w/ IV Cont (09.19.22 @ 02:26) >      1.  Small right lower lobe pulmonary emboli. No evidence of right heart   strain.  2.  Left greater than right patchy consolidative opacities may represent   atelectasis or pneumonia in the appropriate clinical setting.    --- End of Report ---    ***Please see the addendum at the top of this report. It may contain     < end of copied text >

## 2022-09-27 NOTE — CHART NOTE - NSCHARTNOTEFT_GEN_A_CORE
3 Day Calorie Count was initiated from 9/22 to 9/24, the following are the results:    Day 1 - 9/22:  Breakfast:  100% Juice   75% Eggs   75% Pancake   0% Supplement    Total for Breakfast: 290 kcal, 12.5 gm Protein    Lunch:  75% Juice   75% Potatoes   50% Meat   25% Dessert   Total for Lunch: 246 kcal, 7.5 gm Protein     Dinner not documented    Total for Day 1: 536 kcal, 20 gm Protein    Day 2 - 9/23:    Breakfast:  25% Juice   50% Eggs   Total for Breakfast: 113.3 kcal, 4.69 gm Protein     Lunch:  25% Juice   50% Meat   25% Supplement   Total for Lunch: 418.5 kcal, 13.5 gm Protein    Dinner: not documented    Total for Day 2: 531.8 kcal, 18.19 gm Protein     Day 3 - 9/24:  Breakfast: 0% Consumed    Lunch:  25% Coffee  50% Rice   0% Soup  50% Bread   Total for Lunch: 155 kcal, 4.5 gm Protein     Dinner:  100% Juice 60 kcal  100% Potatoes   100% Meat   100% Vegetable   Total for Dinner: 423 kcal, 16 gm Protein     Total for Day 3: 578 kcal, 20.5 gm Protein     Based on results of Calorie Count, patient likely not meeting estimated nutrient needs. Nutrition Support Team following patient at this time, and will follow up with any further nutrition interventions.    RD to remain available: Chantal Mandujano RD [phone]

## 2022-09-27 NOTE — PROGRESS NOTE ADULT - SUBJECTIVE AND OBJECTIVE BOX
SUBJECTIVE:    Patient is a 72y old Female who presents with a chief complaint of sob (26 Sep 2022 15:34)    Currently admitted to medicine with the primary diagnosis of Pulmonary embolism       Today is hospital day 8d. This morning she is resting comfortably in bed and reports no new issues or overnight events. Patient is still complaining of dyspnea and requests that she put on the BiPAP for improvement.    PAST MEDICAL & SURGICAL HISTORY  Amyotrophic lateral sclerosis (ALS)      SOCIAL HISTORY:  Negative for smoking/alcohol/drug use.     ALLERGIES:  No Known Allergies    MEDICATIONS:  STANDING MEDICATIONS  ampicillin/sulbactam  IVPB      ampicillin/sulbactam  IVPB 1.5 Gram(s) IV Intermittent every 6 hours  atorvastatin Oral Tab/Cap - Peds 10 milliGRAM(s) Oral daily  chlorhexidine 2% Cloths 1 Application(s) Topical <User Schedule>  dextrose 5% + sodium chloride 0.45%. 1000 milliLiter(s) IV Continuous <Continuous>  dextrose 5%. 1000 milliLiter(s) IV Continuous <Continuous>  dextrose 5%. 1000 milliLiter(s) IV Continuous <Continuous>  dextrose 50% Injectable 25 Gram(s) IV Push once  dextrose 50% Injectable 12.5 Gram(s) IV Push once  dextrose 50% Injectable 25 Gram(s) IV Push once  enoxaparin Injectable 40 milliGRAM(s) SubCutaneous every 12 hours  glucagon  Injectable 1 milliGRAM(s) IntraMuscular once  insulin lispro (ADMELOG) corrective regimen sliding scale   SubCutaneous three times a day before meals  multivitamin 1 Tablet(s) Oral daily  riluzole 50 milliGRAM(s) Oral two times a day  scopolamine 1 mG/72 Hr(s) Patch 1 Patch Transdermal every 72 hours    PRN MEDICATIONS  acetaminophen     Tablet .. 650 milliGRAM(s) Oral every 6 hours PRN  ALPRAZolam 0.5 milliGRAM(s) Oral four times a day PRN  aluminum hydroxide/magnesium hydroxide/simethicone Suspension 30 milliLiter(s) Oral every 4 hours PRN  dextrose Oral Gel 15 Gram(s) Oral once PRN  hydrOXYzine hydrochloride 25 milliGRAM(s) Oral four times a day PRN  melatonin 3 milliGRAM(s) Oral at bedtime PRN  ondansetron Injectable 4 milliGRAM(s) IV Push every 8 hours PRN    VITALS:   T(F): 96.3  HR: 76  BP: 96/68  RR: 18  SpO2: 96%    LABS:                        8.3    9.14  )-----------( 100      ( 26 Sep 2022 07:44 )             25.2     09-26    139  |  102  |  24<H>  ----------------------------<  92  5.2<H>   |  28  |  0.6<L>    Ca    8.1<L>      26 Sep 2022 22:22  Phos  2.6     09-26  Mg     2.0     09-26    TPro  4.7<L>  /  Alb  3.2<L>  /  TBili  0.4  /  DBili  x   /  AST  106<H>  /  ALT  130<H>  /  AlkPhos  123<H>  09-26      RADIOLOGY:    PHYSICAL EXAM:  GENERAL: NAD   HEAD:  Atraumatic, Normocephalic  EYES: conjunctiva and sclera clear  NECK: Supple, No JVD  CHEST/LUNG: Clear to auscultation bilaterally; Mild b/l Rhonchi  HEART: Regular rate and rhythm; No murmurs, rubs, or gallops  ABDOMEN: Soft, Nontender, Nondistended; Bowel sounds present  EXTREMITIES:  2+ Peripheral Pulses, No clubbing, cyanosis, or edema  SKIN: No rashes or lesions      Intravenous access:   NG tube:   Damon Catheter:

## 2022-09-27 NOTE — PROGRESS NOTE ADULT - PROBLEM/PLAN-3
DISPLAY PLAN FREE TEXT
BRENDON

## 2022-09-27 NOTE — PROGRESS NOTE ADULT - ASSESSMENT
72-year-old female with PMHx of ALS, DM, HTN, HLD, recent covid presents with shortness of breath x1 day.  Patient reportedly 86% on room air at home. No other complaints except for LE swelling and pain at the buttocks. Denies fevers, chills, chest pain, cough, abdominal pain, nausea, vomiting, rash.    #Acute hypoxemic respiratory failure on chronic hypercapnic secondary to ALS  #RLL pulmonary embolus   #Aspiration PNA   - CT PE: Small RLL PE; Patchy LLL consolidative opacities  - Cont therapeutic Lovenox --> on discharge, Eliquis 10 BID x7d, then Eliquis 5mg BID   - Completed 7days of  Unasyn 1.5 q6h on 9/27  - Pulmonary following   - HOB @ 45 degrees  - Aspiration precautions  - NIV during sleep, will benefit from NIV at home  - Morphine 2mg q2h PRN    #ALS  #Oropharyngeal Dysphagia   - f/u neurology recommendations  - Palliative care on board  - Puree diet w/ mildly thick liquids per s/s.   - patient's son does not  want PEG as patient is for hospice care at home  - Goals of care will be discussed by hospice care team    #HTN/HLD  - Cont losartan 25mg qD  - Cont atorvastatin 10mg qD    #DM2  - ISS    DVT PPX, Lovenox    Code status: full code for now (but ongoing discussion)  Dispo: unclear possibly home with hospice care

## 2022-09-28 NOTE — HOSPICE CARE NOTE - CONVESATION DETAILS
Active communication with patient’s daughter Kayla Tuesday regarding goals of care. At this time family acknowledged challenges in continuing management of the patient’s ALS in the community. Further reviewed home care services versus hospice. Kayla requesting home hospice services at this time for end of life care.  Reviewed options for respiratory support in the home. Kayla reports patient was managed on a Bipap in the home on previous palliative care program through [facility]. DME had been picked up once patient was D/C from their services ultimately resulting in current hospitalization for respiratory support. Family requesting a Bipap at home. Kayla reports that the family would be more comfortable with this as they are familiar with how to manage one. Update provided to LUKE Archer who requested medical team provide scripts for bipap to be faxed to hospice intake to process DME request. Conferred with Jomar who provided guidance on how to obtain DME from [facility].  As of 4 pm yesterday no scripts provided to hospice intake dept. Hilary Archer to follow up today at rounds. DME to be coordinated once scripts are received.

## 2022-09-28 NOTE — PROGRESS NOTE ADULT - SUBJECTIVE AND OBJECTIVE BOX
SUBJECTIVE:    Patient is a 72y old Female who presents with a chief complaint of sob (27 Sep 2022 14:44)    Currently admitted to medicine with the primary diagnosis of Pulmonary embolism       Today is hospital day 9d. This morning she is resting comfortably in bed and reports no new issues or overnight events.     PAST MEDICAL & SURGICAL HISTORY  Amyotrophic lateral sclerosis (ALS)      SOCIAL HISTORY:  Negative for smoking/alcohol/drug use.     ALLERGIES:  No Known Allergies    MEDICATIONS:  STANDING MEDICATIONS  ampicillin/sulbactam  IVPB      ampicillin/sulbactam  IVPB 1.5 Gram(s) IV Intermittent every 6 hours  apixaban 10 milliGRAM(s) Oral every 12 hours  atorvastatin Oral Tab/Cap - Peds 10 milliGRAM(s) Oral daily  chlorhexidine 2% Cloths 1 Application(s) Topical <User Schedule>  dextrose 5% + sodium chloride 0.45%. 1000 milliLiter(s) IV Continuous <Continuous>  dextrose 5%. 1000 milliLiter(s) IV Continuous <Continuous>  dextrose 5%. 1000 milliLiter(s) IV Continuous <Continuous>  dextrose 50% Injectable 25 Gram(s) IV Push once  dextrose 50% Injectable 12.5 Gram(s) IV Push once  dextrose 50% Injectable 25 Gram(s) IV Push once  glucagon  Injectable 1 milliGRAM(s) IntraMuscular once  insulin lispro (ADMELOG) corrective regimen sliding scale   SubCutaneous three times a day before meals  multivitamin 1 Tablet(s) Oral daily  riluzole 50 milliGRAM(s) Oral two times a day  scopolamine 1 mG/72 Hr(s) Patch 1 Patch Transdermal every 72 hours    PRN MEDICATIONS  acetaminophen     Tablet .. 650 milliGRAM(s) Oral every 6 hours PRN  ALPRAZolam 0.5 milliGRAM(s) Oral four times a day PRN  aluminum hydroxide/magnesium hydroxide/simethicone Suspension 30 milliLiter(s) Oral every 4 hours PRN  dextrose Oral Gel 15 Gram(s) Oral once PRN  hydrOXYzine hydrochloride 25 milliGRAM(s) Oral four times a day PRN  melatonin 3 milliGRAM(s) Oral at bedtime PRN  ondansetron Injectable 4 milliGRAM(s) IV Push every 8 hours PRN    VITALS:   T(F): 96.1  HR: 112  BP: 100/55  RR: 18  SpO2: 100%    LABS:                        7.5    6.11  )-----------( 106      ( 28 Sep 2022 07:56 )             22.8     09-28    138  |  101  |  14  ----------------------------<  94  3.9   |  28  |  <0.5<L>    Ca    8.2<L>      28 Sep 2022 07:56  Mg     2.0     09-28    TPro  4.6<L>  /  Alb  3.1<L>  /  TBili  0.4  /  DBili  x   /  AST  86<H>  /  ALT  102<H>  /  AlkPhos  117<H>  09-28      RADIOLOGY:    PHYSICAL EXAM:  GENERAL: NAD   HEAD:  Atraumatic, Normocephalic  EYES: conjunctiva and sclera clear  NECK: Supple, No JVD  CHEST/LUNG: Clear to auscultation bilaterally; Mild b/l Rhonchi  HEART: Regular rate and rhythm; No murmurs, rubs, or gallops  ABDOMEN: Soft, Nontender, Nondistended; Bowel sounds present  EXTREMITIES:  2+ Peripheral Pulses, No clubbing, cyanosis, or edema  SKIN: No rashes or lesions      Intravenous access:   NG tube:   Damon Catheter:

## 2022-09-28 NOTE — PROGRESS NOTE ADULT - ASSESSMENT
72-year-old female with PMHx of ALS, DM, HTN, HLD, recent covid presents with shortness of breath x1 day.  Patient reportedly 86% on room air at home. No other complaints except for LE swelling and pain at the buttocks. Denies fevers, chills, chest pain, cough, abdominal pain, nausea, vomiting, rash.    #Acute hypoxemic respiratory failure on chronic hypercapnic secondary to ALS  #RLL pulmonary embolus   #Aspiration PNA   - CT PE: Small RLL PE; Patchy LLL consolidative opacities  - Cont therapeutic Lovenox --> on discharge, Eliquis 10 BID x7d, then Eliquis 5mg BID   - Completed 7days of  Unasyn 1.5 q6h on 9/27  - Pulmonary following   - HOB @ 45 degrees  - Aspiration precautions  - NIV during sleep, will benefit from NIV at home  - Morphine 2mg q2h PRN    #ALS  #Oropharyngeal Dysphagia   - f/u neurology recommendations  - Palliative care on board  - Puree diet w/ mildly thick liquids per s/s.   - patient's son does not  want PEG as patient is for hospice care at home  - Goals of care will be discussed by hospice care team    #HTN/HLD  - Cont losartan 25mg qD  - Cont atorvastatin 10mg qD    #DM2  - ISS    DVT PPX, Lovenox    Code status: full code for now (but ongoing discussion)  Dispo: Home with hospice care

## 2022-09-28 NOTE — PROGRESS NOTE ADULT - ATTENDING COMMENTS
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)   Possible Aspiration   Patient is Moderate to High Risk for PEG placement       Plan as outlined above
Patient seen and examined. No complaints.   Patient is pending PEG placement, however not cleared by pulmonary for any elective procedures. Nutrition consulted for possible TPN until cleared by pulmonary for PEG placement.     Plan of care d/w son.     Pending: Nutrition consult for long term TPN
72-year-old female with PMHx of ALS, DM, HTN, HLD, recent covid presents with shortness of breath x1 day.  Patient reportedly 86% on room air at home. No other complaints except for LE swelling and pain at the buttocks. Denies fevers, chills, chest pain, cough, abdominal pain, nausea, vomiting, rash.    #Acute hypoxemic respiratory failure on chronic hypercapnic secondary to ALS  #RLL pulmonary embolus   #Aspiration PNA   - CT PE: Small RLL PE; Patchy LLL consolidative opacities  - Eliquis 10 BID x7d, then Eliquis 5mg BID   - Dc Unasyn 1.5 q6h, completed course   - Pulmonary following   - HOB @ 45 degrees  - Aspiration precautions  - Wean O2 as tolerated  - NIV   - Hospice f/u      #Hypernatremia - resolved     #ALS  #Oropharyngeal Dysphagia   - f/u neurology recommendations  - Palliative care on board  - Puree diet w/ mildly thick liquids per s/s.      #HTN/HLD  - Cont losartan 25mg qD  - Cont atorvastatin 10mg qD    #DM2  - ISS    DVT PPX, Eliquis     Pending:   DME set up   Plan of care d/w son   Dispo: Home with home hospice
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      Plan as outlined above

## 2022-09-29 NOTE — HOSPICE CARE NOTE - CONVESATION DETAILS
Update received from patient's daughter Kayla who reports that DME is pending delivery this morning. [facility] to further discuss coordination of respiratory therapist to make in home visit to set up the Bipap and educate family on how manage at home. Call placed to [facility] to further discuss. Spoke with rep Hakw who reports that they are reviewing the script at this time and will update hospice intake dept on next steps. Update to be provided when further direction is given by [facility].

## 2022-09-29 NOTE — CHART NOTE - NSCHARTNOTESELECT_GEN_ALL_CORE
Nutrition Support/Nutrition Services
Nutrition Support/Nutrition Services
PEG Post op note
Transfer Note
Calorie Count RD Limited Note/Event Note
Palliative Care - Social Work/Event Note
Palliative Care - Social Work/Event Note
RD limited note/Event Note

## 2022-09-29 NOTE — PROGRESS NOTE ADULT - SUBJECTIVE AND OBJECTIVE BOX
Pt seen and examined at bedside.    VITAL SIGNS (Last 24 hrs):  T(C): 35.8 (09-29-22 @ 05:48), Max: 35.8 (09-29-22 @ 05:48)  HR: 80 (09-29-22 @ 05:48) (62 - 112)  BP: 130/77 (09-29-22 @ 05:48) (95/50 - 159/101)  RR: 18 (09-29-22 @ 05:48) (18 - 19)  SpO2: 100% (09-28-22 @ 20:13) (100% - 100%)  Wt(kg): --  Daily     Daily     I&O's Summary      PHYSICAL EXAM:  GENERAL: NAD   HEAD:  Atraumatic, Normocephalic  EYES:   conjunctiva and sclera clear  NECK: Supple, No JVD  CHEST/LUNG: Clear to auscultation bilaterally; No wheeze  HEART: Regular rate and rhythm; No murmurs, rubs, or gallops  ABDOMEN: Soft, Nontender, Nondistended; Bowel sounds present  EXTREMITIES:  2+ Peripheral Pulses, No clubbing, cyanosis, or edema  SKIN: No rashes or lesions    Labs Reviewed        CBC Full  -  ( 29 Sep 2022 07:29 )  WBC Count : 5.34 K/uL  Hemoglobin : 7.3 g/dL  Hematocrit : 22.1 %  Platelet Count - Automated : 105 K/uL  Mean Cell Volume : 99.1 fL  Mean Cell Hemoglobin : 32.7 pg  Mean Cell Hemoglobin Concentration : 33.0 g/dL  Auto Neutrophil # : 3.59 K/uL  Auto Lymphocyte # : 1.22 K/uL  Auto Monocyte # : 0.43 K/uL  Auto Eosinophil # : 0.04 K/uL  Auto Basophil # : 0.03 K/uL  Auto Neutrophil % : 67.2 %  Auto Lymphocyte % : 22.8 %  Auto Monocyte % : 8.1 %  Auto Eosinophil % : 0.7 %  Auto Basophil % : 0.6 %    BMP:    09-29 @ 07:29    Blood Urea Nitrogen - 13  Calcium - 8.6  Carbond Dioxide - 30  Chloride - 103  Creatinine - 0.5  Glucose - 71  Potassium - 3.4  Sodium - 141           MEDICATIONS  (STANDING):  apixaban 10 milliGRAM(s) Oral every 12 hours  atorvastatin Oral Tab/Cap - Peds 10 milliGRAM(s) Oral daily  chlorhexidine 2% Cloths 1 Application(s) Topical <User Schedule>  dextrose 5% + sodium chloride 0.45%. 1000 milliLiter(s) (60 mL/Hr) IV Continuous <Continuous>  dextrose 5%. 1000 milliLiter(s) (50 mL/Hr) IV Continuous <Continuous>  dextrose 5%. 1000 milliLiter(s) (100 mL/Hr) IV Continuous <Continuous>  dextrose 5%. 1000 milliLiter(s) (50 mL/Hr) IV Continuous <Continuous>  dextrose 5%. 1000 milliLiter(s) (100 mL/Hr) IV Continuous <Continuous>  dextrose 50% Injectable 25 Gram(s) IV Push once  dextrose 50% Injectable 12.5 Gram(s) IV Push once  dextrose 50% Injectable 25 Gram(s) IV Push once  dextrose 50% Injectable 25 Gram(s) IV Push once  dextrose 50% Injectable 12.5 Gram(s) IV Push once  dextrose 50% Injectable 25 Gram(s) IV Push once  glucagon  Injectable 1 milliGRAM(s) IntraMuscular once  glucagon  Injectable 1 milliGRAM(s) IntraMuscular once  insulin lispro (ADMELOG) corrective regimen sliding scale   SubCutaneous three times a day before meals  insulin lispro (ADMELOG) corrective regimen sliding scale   SubCutaneous at bedtime  multivitamin 1 Tablet(s) Oral daily  potassium chloride   Powder 40 milliEquivalent(s) Oral every 4 hours  riluzole 50 milliGRAM(s) Oral two times a day  scopolamine 1 mG/72 Hr(s) Patch 1 Patch Transdermal every 72 hours    MEDICATIONS  (PRN):  acetaminophen     Tablet .. 650 milliGRAM(s) Oral every 6 hours PRN Temp greater or equal to 38C (100.4F), Mild Pain (1 - 3)  ALPRAZolam 0.5 milliGRAM(s) Oral four times a day PRN anxiety  aluminum hydroxide/magnesium hydroxide/simethicone Suspension 30 milliLiter(s) Oral every 4 hours PRN Dyspepsia  dextrose Oral Gel 15 Gram(s) Oral once PRN Blood Glucose LESS THAN 70 milliGRAM(s)/deciliter  dextrose Oral Gel 15 Gram(s) Oral once PRN Blood Glucose LESS THAN 70 milliGRAM(s)/deciliter  hydrOXYzine hydrochloride 25 milliGRAM(s) Oral four times a day PRN Anxiety  melatonin 3 milliGRAM(s) Oral at bedtime PRN Insomnia  ondansetron Injectable 4 milliGRAM(s) IV Push every 8 hours PRN Nausea and/or Vomiting   Yes

## 2022-09-29 NOTE — PROGRESS NOTE ADULT - ASSESSMENT
72-year-old female with PMHx of ALS, DM, HTN, HLD, recent covid presents with shortness of breath x1 day.  Patient reportedly 86% on room air at home. No other complaints except for LE swelling and pain at the buttocks. Denies fevers, chills, chest pain, cough, abdominal pain, nausea, vomiting, rash.    #Acute hypoxemic respiratory failure on chronic hypercapnic secondary to ALS  #RLL pulmonary embolus   #Aspiration PNA   - CT PE: Small RLL PE; Patchy LLL consolidative opacities  - Cont therapeutic Lovenox --> on discharge, Eliquis 10 BID x7d, then Eliquis 5mg BID   - Completed 7 days of  Unasyn 1.5 q6h on 9/27  - Pulmonary following   - HOB @ 45 degrees  - Aspiration precautions  - NIV during sleep, will benefit from NIV at home  - Morphine 2mg q2h PRN    #ALS  #Oropharyngeal Dysphagia   - f/u neurology recommendations  - Palliative care on board  - Puree diet w/ mildly thick liquids per s/s.   - patient's son does not  want PEG as patient is for hospice care at home  - Goals of care discussed by hospice care team adn plan to discharge patient home on hospice    #HTN/HLD  - Cont losartan 25mg qD  - Cont atorvastatin 10mg qD    #DM2  - ISS    DVT PPX, Lovenox    Code status: full code   Dispo: Home with hospice care

## 2022-09-29 NOTE — PROGRESS NOTE ADULT - SUBJECTIVE AND OBJECTIVE BOX
SUBJECTIVE:    Patient is a 72y old Female who presents with a chief complaint of sob (29 Sep 2022 12:55)    Currently admitted to medicine with the primary diagnosis of Pulmonary embolism       Today is hospital day 10d. This morning she is resting comfortably in bed and reports no new issues or overnight events.     PAST MEDICAL & SURGICAL HISTORY  Amyotrophic lateral sclerosis (ALS)      SOCIAL HISTORY:  Negative for smoking/alcohol/drug use.     ALLERGIES:  No Known Allergies    MEDICATIONS:  STANDING MEDICATIONS  apixaban 10 milliGRAM(s) Oral every 12 hours  atorvastatin Oral Tab/Cap - Peds 10 milliGRAM(s) Oral daily  chlorhexidine 2% Cloths 1 Application(s) Topical <User Schedule>  dextrose 5% + sodium chloride 0.45%. 1000 milliLiter(s) IV Continuous <Continuous>  dextrose 5%. 1000 milliLiter(s) IV Continuous <Continuous>  dextrose 5%. 1000 milliLiter(s) IV Continuous <Continuous>  dextrose 5%. 1000 milliLiter(s) IV Continuous <Continuous>  dextrose 5%. 1000 milliLiter(s) IV Continuous <Continuous>  dextrose 50% Injectable 25 Gram(s) IV Push once  dextrose 50% Injectable 12.5 Gram(s) IV Push once  dextrose 50% Injectable 25 Gram(s) IV Push once  dextrose 50% Injectable 25 Gram(s) IV Push once  dextrose 50% Injectable 12.5 Gram(s) IV Push once  dextrose 50% Injectable 25 Gram(s) IV Push once  glucagon  Injectable 1 milliGRAM(s) IntraMuscular once  glucagon  Injectable 1 milliGRAM(s) IntraMuscular once  insulin lispro (ADMELOG) corrective regimen sliding scale   SubCutaneous at bedtime  insulin lispro (ADMELOG) corrective regimen sliding scale   SubCutaneous three times a day before meals  multivitamin 1 Tablet(s) Oral daily  potassium chloride   Powder 40 milliEquivalent(s) Oral every 4 hours  riluzole 50 milliGRAM(s) Oral two times a day  scopolamine 1 mG/72 Hr(s) Patch 1 Patch Transdermal every 72 hours    PRN MEDICATIONS  acetaminophen     Tablet .. 650 milliGRAM(s) Oral every 6 hours PRN  ALPRAZolam 0.5 milliGRAM(s) Oral four times a day PRN  aluminum hydroxide/magnesium hydroxide/simethicone Suspension 30 milliLiter(s) Oral every 4 hours PRN  dextrose Oral Gel 15 Gram(s) Oral once PRN  dextrose Oral Gel 15 Gram(s) Oral once PRN  hydrOXYzine hydrochloride 25 milliGRAM(s) Oral four times a day PRN  melatonin 3 milliGRAM(s) Oral at bedtime PRN  ondansetron Injectable 4 milliGRAM(s) IV Push every 8 hours PRN    VITALS:   T(F): 96.5  HR: 80  BP: 130/77  RR: 18  SpO2: 100%    LABS:                        7.3    5.34  )-----------( 105      ( 29 Sep 2022 07:29 )             22.1     09-29    141  |  103  |  13  ----------------------------<  71  3.4<L>   |  30  |  0.5<L>    Ca    8.6      29 Sep 2022 07:29  Mg     1.9     09-29    TPro  4.5<L>  /  Alb  3.2<L>  /  TBili  0.4  /  DBili  x   /  AST  63<H>  /  ALT  83<H>  /  AlkPhos  111  09-29      RADIOLOGY:    PHYSICAL EXAM:  GENERAL: NAD   HEAD:  Atraumatic, Normocephalic  EYES: conjunctiva and sclera clear  NECK: Supple, No JVD  CHEST/LUNG: Clear to auscultation bilaterally; Mild b/l Rhonchi  HEART: Regular rate and rhythm; No murmurs, rubs, or gallops  ABDOMEN: Soft, Nontender, Nondistended; Bowel sounds present  EXTREMITIES:  2+ Peripheral Pulses, No clubbing, cyanosis, or edema  SKIN: No rashes or lesions

## 2022-09-29 NOTE — HOSPICE CARE NOTE - CONVESATION DETAILS
Update received from Ernesto at [facility] surgical reporting that bipap pending delivery tomorrow between 11 AM & 1 pm. Respiratory therapist will educate family on management of bipap. Patient to be D/C tomorrow afternoon. Hospice to admit to services Saturday. Patients daughter Kayla verbalized agreement with the plan.

## 2022-09-29 NOTE — PROGRESS NOTE ADULT - PROVIDER SPECIALTY LIST ADULT
Internal Medicine
Palliative Care
Pulmonology
Hospitalist
Hospitalist
Internal Medicine
Hospitalist
Internal Medicine
Nutrition Support
Palliative Care
Pulmonology
Gastroenterology
Internal Medicine
Internal Medicine
Palliative Care

## 2022-09-29 NOTE — CHART NOTE - NSCHARTNOTEFT_GEN_A_CORE
NUTRITION SUPPORT TEAM  -  PROGRESS NOTE     Interval Events:    Alert, comfortable. On and off bipap, when off able to eat. PO intake fair, variable  Hospice planned    REVIEW OF SYSTEMS:  Negative except as noted above.     VITALS:  T(F): 96.5 (09-29 @ 05:48), Max: 96.5 (09-29 @ 05:48)  HR: 80 (09-29 @ 05:48) (62 - 80)  BP: 130/77 (09-29 @ 05:48) (95/50 - 130/77)  RR: 18 (09-29 @ 05:48) (18 - 18)    HEIGHT/WEIGHT/BMI:     Weight (kg): 39.5 (09-25)    MEDICATIONS:   acetaminophen     Tablet .. 650 milliGRAM(s) Oral every 6 hours PRN  ALPRAZolam 0.5 milliGRAM(s) Oral four times a day PRN  aluminum hydroxide/magnesium hydroxide/simethicone Suspension 30 milliLiter(s) Oral every 4 hours PRN  apixaban 10 milliGRAM(s) Oral every 12 hours  atorvastatin Oral Tab/Cap - Peds 10 milliGRAM(s) Oral daily  chlorhexidine 2% Cloths 1 Application(s) Topical <User Schedule>  dextrose 5% + sodium chloride 0.45%. 1000 milliLiter(s) IV Continuous <Continuous>  dextrose 5%. 1000 milliLiter(s) IV Continuous <Continuous>  dextrose 5%. 1000 milliLiter(s) IV Continuous <Continuous>  dextrose 5%. 1000 milliLiter(s) IV Continuous <Continuous>  dextrose 5%. 1000 milliLiter(s) IV Continuous <Continuous>  dextrose 50% Injectable 25 Gram(s) IV Push once  dextrose 50% Injectable 12.5 Gram(s) IV Push once  dextrose 50% Injectable 25 Gram(s) IV Push once  dextrose 50% Injectable 25 Gram(s) IV Push once  dextrose 50% Injectable 12.5 Gram(s) IV Push once  dextrose 50% Injectable 25 Gram(s) IV Push once  dextrose Oral Gel 15 Gram(s) Oral once PRN  dextrose Oral Gel 15 Gram(s) Oral once PRN  glucagon  Injectable 1 milliGRAM(s) IntraMuscular once  glucagon  Injectable 1 milliGRAM(s) IntraMuscular once  hydrOXYzine hydrochloride 25 milliGRAM(s) Oral four times a day PRN  insulin lispro (ADMELOG) corrective regimen sliding scale   SubCutaneous at bedtime  insulin lispro (ADMELOG) corrective regimen sliding scale   SubCutaneous three times a day before meals  melatonin 3 milliGRAM(s) Oral at bedtime PRN  multivitamin 1 Tablet(s) Oral daily  ondansetron Injectable 4 milliGRAM(s) IV Push every 8 hours PRN  riluzole 50 milliGRAM(s) Oral two times a day  scopolamine 1 mG/72 Hr(s) Patch 1 Patch Transdermal every 72 hours    LABS:                         7.3    5.34  )-----------( 105      ( 29 Sep 2022 07:29 )             22.1     141  |  103  |  13  ----------------------------<  71          (09-29-22 @ 07:29)  3.4<L>   |  30  |  0.5<L>    Ca    8.6          (09-29-22 @ 07:29)  Mg     1.9         (09-29-22 @ 07:29)    TPro  4.5<L>  /  Alb  3.2<L>  /  TBili  0.4  /  DBili  x   /  AST  63<H>  /  ALT  83<H>  /  AlkPhos  111       09-29-22 @ 07:29    Blood Glucose (Past 24 hours):  119 mg/dL (09-29 @ 11:19)  85 mg/dL (09-29 @ 08:05)  339 mg/dL (09-28 @ 21:05)  184 mg/dL (09-28 @ 18:15)  173 mg/dL (09-28 @ 16:28)  117 mg/dL (09-28 @ 11:48)    DIET:   Diet, Regular:   Consistent Carbohydrate {Evening Snack}  Pureed (PUREED)  Free Water Flush Instructions:  YOGURT and magic cup 2x day each please  Supplement Feeding Modality:  Oral  Glucerna Shake Cans or Servings Per Day:  2       Frequency:  Two Times a day (09-23-22 @ 10:14) [Active]    ASSESSMENT  72-year-old female with PMHx of ALS, DM, HTN, HLD, recent covid presents with shortness of breath x1 day.  Patient reportedly 86% on room air at home. No other complaints except for LE swelling and pain at the buttocks. Denies fevers, chills, chest pain, cough, abdominal pain, nausea, vomiting, rash.    - acute hypoxic respiratory failure, +PE  - chronic hypercapnia secondary to ALS  - RLL pulmonary embolus/No RHS  - hypokalemia   - DM    PLAN  - obtain height and document - then calc BMI  - check phos - keep level > 3.5  - PO diet as tolerated when off bipap  - cont Glucerna shake 8oz bid; added yogurt and Magic Cup  - change MV to MV with minerals 1 tab daily - for home obtain a chewable adult MV  - home with hospice care today, no further indication for aggressive nutrition support intervention, please recall service prn NUTRITION SUPPORT TEAM  -  PROGRESS NOTE     Interval Events:    Alert, comfortable. On and off bipap, when off able to eat. PO intake fair, variable  Hospice planned.    REVIEW OF SYSTEMS:  Negative except as noted above.     VITALS:  T(F): 96.5 (09-29 @ 05:48), Max: 96.5 (09-29 @ 05:48)  HR: 80 (09-29 @ 05:48) (62 - 80)  BP: 130/77 (09-29 @ 05:48) (95/50 - 130/77)  RR: 18 (09-29 @ 05:48) (18 - 18)    HEIGHT/WEIGHT/BMI:     Weight (kg): 39.5 (09-25)    MEDICATIONS:   acetaminophen     Tablet .. 650 milliGRAM(s) Oral every 6 hours PRN  ALPRAZolam 0.5 milliGRAM(s) Oral four times a day PRN  aluminum hydroxide/magnesium hydroxide/simethicone Suspension 30 milliLiter(s) Oral every 4 hours PRN  apixaban 10 milliGRAM(s) Oral every 12 hours  atorvastatin Oral Tab/Cap - Peds 10 milliGRAM(s) Oral daily  chlorhexidine 2% Cloths 1 Application(s) Topical <User Schedule>  dextrose 5% + sodium chloride 0.45%. 1000 milliLiter(s) IV Continuous <Continuous>  hydrOXYzine hydrochloride 25 milliGRAM(s) Oral four times a day PRN  insulin lispro (ADMELOG) corrective regimen sliding scale   SubCutaneous at bedtime  insulin lispro (ADMELOG) corrective regimen sliding scale   SubCutaneous three times a day before meals  melatonin 3 milliGRAM(s) Oral at bedtime PRN  multivitamin 1 Tablet(s) Oral daily  ondansetron Injectable 4 milliGRAM(s) IV Push every 8 hours PRN  riluzole 50 milliGRAM(s) Oral two times a day  scopolamine 1 mG/72 Hr(s) Patch 1 Patch Transdermal every 72 hours    LABS:                         7.3    5.34  )-----------( 105      ( 29 Sep 2022 07:29 )             22.1     141  |  103  |  13  ----------------------------<  71          (09-29-22 @ 07:29)  3.4<L>   |  30  |  0.5<L>    Ca    8.6          (09-29-22 @ 07:29)  Mg     1.9         (09-29-22 @ 07:29)    TPro  4.5<L>  /  Alb  3.2<L>  /  TBili  0.4  /  DBili  x   /  AST  63<H>  /  ALT  83<H>  /  AlkPhos  111       09-29-22 @ 07:29    Blood Glucose (Past 24 hours):  119 mg/dL (09-29 @ 11:19)  85 mg/dL (09-29 @ 08:05)  339 mg/dL (09-28 @ 21:05)  184 mg/dL (09-28 @ 18:15)  173 mg/dL (09-28 @ 16:28)  117 mg/dL (09-28 @ 11:48)    DIET:   Diet, Regular:   Consistent Carbohydrate {Evening Snack}  Pureed (PUREED)  Free Water Flush Instructions:  YOGURT and magic cup 2x day each please  Supplement Feeding Modality:  Oral  Glucerna Shake Cans or Servings Per Day:  2       Frequency:  Two Times a day (09-23-22 @ 10:14) [Active]    ASSESSMENT  72-year-old female with PMHx of ALS, DM, HTN, HLD, recent covid presents with shortness of breath x1 day.  Patient reportedly 86% on room air at home. No other complaints except for LE swelling and pain at the buttocks. Denies fevers, chills, chest pain, cough, abdominal pain, nausea, vomiting, rash.    - acute hypoxic respiratory failure, +PE  - chronic hypercapnia secondary to ALS  - RLL pulmonary embolus/No RHS  - hypokalemia   - DM    PLAN  - obtain height and document - then calc BMI  - check phos - keep level > 3.5  - PO diet as tolerated when off bipap  - cont Glucerna shake 8oz bid; added yogurt and Magic Cup  - change MV to MV with minerals 1 tab daily - for home obtain a chewable adult MV  - home with hospice care today, no further indication for aggressive nutrition support intervention, please recall service prn

## 2022-09-29 NOTE — PROGRESS NOTE ADULT - ASSESSMENT
72-year-old female with PMHx of ALS, DM, HTN, HLD, recent covid presents with shortness of breath x1 day.  Patient reportedly 86% on room air at home. No other complaints except for LE swelling and pain at the buttocks. Denies fevers, chills, chest pain, cough, abdominal pain, nausea, vomiting, rash.    #Acute hypoxemic respiratory failure on chronic hypercapnic secondary to ALS  #RLL pulmonary embolus   #Aspiration PNA   - CT PE: Small RLL PE; Patchy LLL consolidative opacities  - Eliquis 10 BID x7d, then Eliquis 5mg BID   - Dc Unasyn 1.5 q6h, completed course   - Pulmonary following   - HOB @ 45 degrees  - Aspiration precautions  - Wean O2 as tolerated  - NIV   - Hospice f/u      #Hypernatremia - resolved     #ALS  #Oropharyngeal Dysphagia   - f/u neurology recommendations  - Palliative care on board  - Puree diet w/ mildly thick liquids per s/s.      #HTN/HLD  - Cont losartan 25mg qD  - Cont atorvastatin 10mg qD    #DM2  - ISS    DVT PPX, Eliquis     Pending:   DME set up     Dispo: Home with home hospice .

## 2022-10-07 DIAGNOSIS — I26.99 OTHER PULMONARY EMBOLISM WITHOUT ACUTE COR PULMONALE: ICD-10-CM

## 2022-10-07 DIAGNOSIS — E78.5 HYPERLIPIDEMIA, UNSPECIFIED: ICD-10-CM

## 2022-10-07 DIAGNOSIS — F41.9 ANXIETY DISORDER, UNSPECIFIED: ICD-10-CM

## 2022-10-07 DIAGNOSIS — R47.1 DYSARTHRIA AND ANARTHRIA: ICD-10-CM

## 2022-10-07 DIAGNOSIS — G12.21 AMYOTROPHIC LATERAL SCLEROSIS: ICD-10-CM

## 2022-10-07 DIAGNOSIS — I10 ESSENTIAL (PRIMARY) HYPERTENSION: ICD-10-CM

## 2022-10-07 DIAGNOSIS — Z86.16 PERSONAL HISTORY OF COVID-19: ICD-10-CM

## 2022-10-07 DIAGNOSIS — E46 UNSPECIFIED PROTEIN-CALORIE MALNUTRITION: ICD-10-CM

## 2022-10-07 DIAGNOSIS — J96.01 ACUTE RESPIRATORY FAILURE WITH HYPOXIA: ICD-10-CM

## 2022-10-07 DIAGNOSIS — I24.8 OTHER FORMS OF ACUTE ISCHEMIC HEART DISEASE: ICD-10-CM

## 2022-10-07 DIAGNOSIS — Z74.01 BED CONFINEMENT STATUS: ICD-10-CM

## 2022-10-07 DIAGNOSIS — J98.11 ATELECTASIS: ICD-10-CM

## 2022-10-07 DIAGNOSIS — E87.0 HYPEROSMOLALITY AND HYPERNATREMIA: ICD-10-CM

## 2022-10-07 DIAGNOSIS — R13.12 DYSPHAGIA, OROPHARYNGEAL PHASE: ICD-10-CM

## 2022-10-07 DIAGNOSIS — E87.6 HYPOKALEMIA: ICD-10-CM

## 2022-10-07 DIAGNOSIS — J69.0 PNEUMONITIS DUE TO INHALATION OF FOOD AND VOMIT: ICD-10-CM

## 2022-10-07 DIAGNOSIS — J96.12 CHRONIC RESPIRATORY FAILURE WITH HYPERCAPNIA: ICD-10-CM

## 2022-10-07 DIAGNOSIS — D64.9 ANEMIA, UNSPECIFIED: ICD-10-CM

## 2022-10-07 DIAGNOSIS — E11.9 TYPE 2 DIABETES MELLITUS WITHOUT COMPLICATIONS: ICD-10-CM

## 2022-10-20 ENCOUNTER — APPOINTMENT (OUTPATIENT)
Dept: CARDIOLOGY | Facility: CLINIC | Age: 72
End: 2022-10-20

## 2022-11-16 ENCOUNTER — APPOINTMENT (OUTPATIENT)
Dept: PULMONOLOGY | Facility: CLINIC | Age: 72
End: 2022-11-16

## 2022-12-02 ENCOUNTER — APPOINTMENT (OUTPATIENT)
Dept: NEUROLOGY | Facility: CLINIC | Age: 72
End: 2022-12-02

## 2023-01-05 ENCOUNTER — APPOINTMENT (OUTPATIENT)
Dept: CARDIOLOGY | Facility: CLINIC | Age: 73
End: 2023-01-05

## 2023-09-15 NOTE — ASSESSMENT
[FreeTextEntry1] :   htn is well controlled\par  meds renewed Xolair Pregnancy And Lactation Text: This medication is Pregnancy Category B and is considered safe during pregnancy. This medication is excreted in breast milk.

## 2024-07-29 NOTE — PATIENT PROFILE ADULT - NSPROGENSOURCEINFO_GEN_A_NUR
Spoke with pt relaying Monique JORDAN message   STD testing appears negative  Pt verbalized understanding. No further assistance     ----- Message from Monique Sparrow PA-C sent at 7/29/2024 12:41 PM EDT -----  STD testing appears negative   patient

## 2024-12-26 NOTE — ED ADULT NURSE NOTE - PAIN RATING/NUMBER SCALE (0-10): ACTIVITY
"Relay     \"Is pt wanting to do HH when she leaves the hospital? Does pt want to schedule a hospital follow up? \"                 " 0

## 2025-04-27 NOTE — ED ADULT NURSE NOTE - NS ED NURSE REPORT GIVEN DT
"Pt ambulated into triage with c/o leg swelling, CP, and \"phlegm in my lungs\" causing SOB. Pt stopped using fentanyl patches 2-7 days ago and is now on methadone. Protocol orders placed. Pt is A&O and ambulatory. Placed in lobby pending ER room.   " 20-Sep-2022 05:25